# Patient Record
Sex: FEMALE | Race: WHITE | NOT HISPANIC OR LATINO | Employment: OTHER | URBAN - METROPOLITAN AREA
[De-identification: names, ages, dates, MRNs, and addresses within clinical notes are randomized per-mention and may not be internally consistent; named-entity substitution may affect disease eponyms.]

---

## 2017-01-06 ENCOUNTER — APPOINTMENT (OUTPATIENT)
Dept: WOMENS IMAGING | Facility: HOSPITAL | Age: 52
End: 2017-01-06

## 2017-01-06 PROCEDURE — G0202 SCR MAMMO BI INCL CAD: HCPCS | Performed by: RADIOLOGY

## 2017-01-06 PROCEDURE — 77063 BREAST TOMOSYNTHESIS BI: CPT | Performed by: RADIOLOGY

## 2017-01-09 ENCOUNTER — OFFICE VISIT (OUTPATIENT)
Dept: NEUROSURGERY | Facility: CLINIC | Age: 52
End: 2017-01-09

## 2017-01-09 VITALS
HEIGHT: 64 IN | BODY MASS INDEX: 32.27 KG/M2 | WEIGHT: 189 LBS | SYSTOLIC BLOOD PRESSURE: 128 MMHG | HEART RATE: 69 BPM | DIASTOLIC BLOOD PRESSURE: 81 MMHG

## 2017-01-09 DIAGNOSIS — M50.31 OTHER CERVICAL DISC DEGENERATION, HIGH CERVICAL REGION: ICD-10-CM

## 2017-01-09 DIAGNOSIS — Z86.018 HISTORY OF BENIGN SCHWANNOMA: Primary | ICD-10-CM

## 2017-01-09 PROCEDURE — 99213 OFFICE O/P EST LOW 20 MIN: CPT | Performed by: NEUROLOGICAL SURGERY

## 2017-01-09 RX ORDER — LORAZEPAM 0.5 MG/1
TABLET ORAL
Refills: 0 | COMMUNITY
Start: 2016-12-21 | End: 2017-03-16 | Stop reason: ALTCHOICE

## 2017-01-09 NOTE — PROGRESS NOTES
Subjective   Patient ID: Machelle Ortega is a 51 y.o. female is here today for follow-up of neck and right arm pain with new MRI of the thoracic and cervical spine and EMG of all four extremities.    The patient notes that her pain symptoms are unchanged since her last visit.    Neck Pain    This is a chronic problem. The current episode started more than 1 month ago. The problem occurs daily. The problem has been unchanged. Associated symptoms include a visual change and weakness. Pertinent negatives include no chest pain, fever or headaches.   Neurologic Problem   The patient's primary symptoms include clumsiness, focal sensory loss, focal weakness, a loss of balance, memory loss, a visual change and weakness. The patient's pertinent negatives include no altered mental status, near-syncope, slurred speech or syncope. This is a new problem. The current episode started 1 to 4 weeks ago. The neurological problem developed gradually. The problem has been waxing and waning since onset. There was right-sided and upper extremity focality noted. Associated symptoms include back pain, bladder incontinence, bowel incontinence, diaphoresis, fatigue, light-headedness, nausea, neck pain and shortness of breath. Pertinent negatives include no abdominal pain, auditory change, aura, chest pain, confusion, dizziness, fever, headaches, palpitations, vertigo or vomiting. Past treatments include acetaminophen, bed rest, medication, neck support, position change, sleep and walking. The treatment provided mild relief.       The following portions of the patient's history were reviewed and updated as appropriate: allergies, current medications, past family history, past medical history, past social history, past surgical history and problem list.    Review of Systems   Constitutional: Positive for diaphoresis and fatigue. Negative for fever.   Respiratory: Positive for shortness of breath.    Cardiovascular: Negative for chest pain,  palpitations and near-syncope.   Gastrointestinal: Positive for bowel incontinence and nausea. Negative for abdominal pain and vomiting.   Genitourinary: Positive for bladder incontinence.   Musculoskeletal: Positive for back pain and neck pain.   Neurological: Positive for focal weakness, weakness, light-headedness and loss of balance. Negative for dizziness, vertigo, syncope and headaches.   Psychiatric/Behavioral: Positive for memory loss. Negative for confusion.   All other systems reviewed and are negative.    It's been 3 years since her cervical corpectomy at C4-C5 with a cage and plate.  We discussed the cervical MRI which didn't show any severe cord compression.  Her symptoms are likely coming from the neck but nothing operative is needed.  We discussed also the thoracic MRI which showed no recurrence of tumor.  She also has a right shoulder problem and is seen by the orthopedic surgeon for that.  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  I asked her to talk to the thoracic surgeons about that.  I reassured her that nothing surgical as needed for the neck.  We'll try and work this out with some additional therapy.  She remains on gabapentin.  She does have a history of fibromyalgia.    Objective   Physical Exam   Constitutional: She is oriented to person, place, and time. She appears well-developed and well-nourished.   HENT:   Head: Normocephalic and atraumatic.   Eyes: Conjunctivae and EOM are normal. Pupils are equal, round, and reactive to light.   Fundoscopic exam:       The right eye shows no papilledema. The right eye shows venous pulsations.        The left eye shows no papilledema. The left eye shows venous pulsations.   Neck: Carotid bruit is not present.   Neurological: She is oriented to person, place, and time. She has a normal Finger-Nose-Finger Test and a normal Heel to Shin Test. Gait normal.   Reflex Scores:        Tricep reflexes are 2+ on the right side and 2+ on the left side.       Bicep reflexes are 2+ on the right side and 2+ on the left side.       Brachioradialis reflexes are 2+ on the right side and 2+ on the left side.       Patellar reflexes are 2+ on the right side and 2+ on the left side.       Achilles reflexes are 2+ on the right side and 2+ on the left side.  Psychiatric: Her speech is normal.     Neurologic Exam     Mental Status   Oriented to person, place, and time.   Registration of memory: Good recent and remote memory.   Attention: normal. Concentration: normal.   Speech: speech is normal   Level of consciousness: alert  Knowledge: consistent with education.     Cranial Nerves     CN II   Visual fields full to confrontation.   Visual acuity: normal    CN III, IV, VI   Pupils are equal, round, and reactive to light.  Extraocular motions are normal.     CN V   Facial sensation intact.   Right corneal reflex: normal  Left corneal reflex: normal    CN VII   Facial expression full, symmetric.   Right facial weakness: none  Left facial weakness: none    CN VIII   Hearing: intact    CN IX, X   Palate: symmetric    CN XI   Right sternocleidomastoid strength: normal  Left sternocleidomastoid strength: normal    CN XII   Tongue: not atrophic  Tongue deviation: none    Motor Exam   Muscle bulk: normal  Right arm tone: normal  Left arm tone: normal  Right leg tone: normal  Left leg tone: normal    Strength   Strength 5/5 except as noted.     Sensory Exam   Light touch normal.     Gait, Coordination, and Reflexes     Gait  Gait: normal    Coordination   Finger to nose coordination: normal  Heel to shin coordination: normal    Reflexes   Right brachioradialis: 2+  Left brachioradialis: 2+  Right biceps: 2+  Left biceps: 2+  Right triceps: 2+  Left triceps: 2+  Right patellar: 2+  Left patellar: 2+  Right achilles: 2+  Left achilles: 2+  Right : 2+  Left : 2+      Assessment/Plan   Independent Review of  Radiographic Studies:    Reviewed her MRI of the cervical thoracic spine.  There is no evidence of tumor recurrence in the thoracic spine.  No evidence of any significant adjacent level cord compression in the cervical MRI.  She had a C4 and C5 corpectomy with a cage and plate.    Medical Decision Making:    Nothing reoperative in the cervical spine.  No evidence of tumor recurrence of the thoracic spine.  We'll try some physical therapy for the neck.  We'll also send her to a new pain management group as per her request.  We'll see her in 4 months  She will touch base with the orthopedic surgeon about her right shoulder and with the thoracic team about her apparent recurrent diaphragmatic problem.      Machelle was seen today for neck pain.    Diagnoses and all orders for this visit:    History of benign schwannoma  -     Ambulatory Referral to Pain Management  -     Ambulatory Referral to Physical Therapy Evaluate and treat    Other cervical disc degeneration, high cervical region  -     Ambulatory Referral to Pain Management  -     Ambulatory Referral to Physical Therapy Evaluate and treat      Return in about 4 months (around 5/9/2017).

## 2017-01-09 NOTE — MR AVS SNAPSHOT
Machelle Ortega   1/9/2017 10:45 AM   Office Visit    Dept Phone:  [phone]   Encounter #:  [id number]    Provider:  Dayron Jarrett MD   Department:  [facility] NEUROSURGERY                Your Full Care Plan              Today's Medication Changes          These changes are accurate as of: 1/9/17 12:18 PM.  If you have any questions, ask your nurse or doctor.               Medication(s)that have changed:     FLUoxetine 40 MG capsule   Commonly known as:  PROzac   Take 40 mg by mouth daily.   What changed:  Another medication with the same name was removed. Continue taking this medication, and follow the directions you see here.   Changed by:  Iqra Powell MD         Stop taking medication(s)listed here:     diclofenac 1 % gel gel   Commonly known as:  VOLTAREN   Stopped by:  Dayron Jarrett MD                      Your Updated Medication List          This list is accurate as of: 1/9/17 12:18 PM.  Always use your most recent med list.                amphetamine-dextroamphetamine 20 MG tablet   Commonly known as:  ADDERALL       FLUoxetine 40 MG capsule   Commonly known as:  PROzac       fluticasone 50 MCG/ACT nasal spray   Commonly known as:  FLONASE       Gabapentin (Once-Daily) 300 MG tablet   Take 800 mg by mouth 2 (two) times a day.       HYDROcodone-acetaminophen 5-325 MG per tablet   Commonly known as:  NORCO       lamoTRIgine 200 MG tablet   Commonly known as:  LaMICtal       LORazepam 0.5 MG tablet   Commonly known as:  ATIVAN       MethylPREDNISolone 4 MG tablet   Commonly known as:  MEDROL (LULY)   follow package directions       pantoprazole 40 MG EC tablet   Commonly known as:  PROTONIX   TAKE 1 TABLET BY MOUTH EVERY DAY       traZODone 100 MG tablet   Commonly known as:  DESYREL               We Performed the Following     Ambulatory Referral to Pain Management     Ambulatory Referral to Physical Therapy Evaluate and treat       You Were Diagnosed  With        Codes Comments    History of benign schwannoma    -  Primary ICD-10-CM: Z86.018  ICD-9-CM: V12.49     Other cervical disc degeneration, high cervical region     ICD-10-CM: M50.31  ICD-9-CM: 722.4       Instructions     None    Patient Instructions History      Upcoming Appointments     Visit Type Date Time Department    FOLLOW UP 1/9/2017 10:45 AM [facility]    OFFICE VISIT 2/27/2017  2:30 PM [facility]AC[facility]    FOLLOW UP 5/8/2017 10:00 AM [facility] [place] [facility]      MyChart Signup     Our records indicate that you have an active [facility] [employer] account.    You can view your After Visit Summary by going to [employer] [place] and logging in with your [employer] username and password.  If you don't have a [employer] username and password but a parent or guardian has access to your record, the parent or guardian should login with their own [employer] username and password and access your record to view the After Visit Summary.    If you have questions, you can email [email or web address] or call [phone] to talk to our [employer] staff.  Remember, [employer] is NOT to be used for urgent needs.  For medical emergencies, dial 911.               Other Info from Your Visit           Your Appointments     Feb 27, 2017  2:30 PM EST   Office Visit with Darryl Benjamin MD   [facility] THORACIC SURGERY (--)    [address]  [facility] [address] [phone]           Arrive 15 minutes prior to appointment.            May 08, 2017 10:00 AM EDT   Follow Up with Dayron Jarrett MD   [facility] NEUROSURGERY (--)    [address]  [facility] [address] [phone]           Arrive 15 minutes prior to appointment.              Allergies     Budesonide-formoterol Fumarate      Oxycodone-acetaminophen      Pregabalin        Reason for Visit     Neck Pain           Vital Signs     Blood Pressure Pulse Height Weight Body Mass Index  "Smoking Status    128/81 69 64\" (162.6 cm) 189 lb (85.7 kg) 32.44 kg/m2 Never Smoker      Problems and Diagnoses Noted     History of benign schwannoma    Degeneration of intervertebral disc of cervical region        "

## 2017-01-09 NOTE — LETTER
January 9, 2017     Iqra Powell MD  [address]  [place] [id number]    Patient: Machelle Ortega   YOB: 1965   Date of Visit: 1/9/2017       Dear Dr. Andre MD:    Machelle Ortega was in my office today. Below are the relevant portions of my assessment and plan of care.      Independent Review of Radiographic Studies:    Reviewed her MRI of the cervical thoracic spine.  There is no evidence of tumor recurrence in the thoracic spine.  No evidence of any significant adjacent level cord compression in the cervical MRI.  She had a C4 and C5 corpectomy with a cage and plate.    Medical Decision Making:    Nothing reoperative in the cervical spine.  No evidence of tumor recurrence of the thoracic spine.  We'll try some physical therapy for the neck.  We'll also send her to a new pain management group as per her request.  We'll see her in 4 months  She will touch base with the orthopedic surgeon about her right shoulder and with the thoracic team about her apparent recurrent diaphragmatic problem.      Machelle was seen today for neck pain.    Diagnoses and all orders for this visit:    History of benign schwannoma  -     Ambulatory Referral to Pain Management  -     Ambulatory Referral to Physical Therapy Evaluate and treat    Other cervical disc degeneration, high cervical region  -     Ambulatory Referral to Pain Management  -     Ambulatory Referral to Physical Therapy Evaluate and treat      Return in about 4 months (around 5/9/2017).            If you have questions, please do not hesitate to call me. I look forward to following Machelle along with you.         Sincerely,        Dayron Jarrett MD        CC: No Recipients

## 2017-01-25 ENCOUNTER — OFFICE VISIT (OUTPATIENT)
Dept: PAIN MEDICINE | Facility: CLINIC | Age: 52
End: 2017-01-25

## 2017-01-25 VITALS
OXYGEN SATURATION: 98 % | WEIGHT: 187.4 LBS | BODY MASS INDEX: 31.99 KG/M2 | TEMPERATURE: 98.4 F | RESPIRATION RATE: 18 BRPM | SYSTOLIC BLOOD PRESSURE: 107 MMHG | HEIGHT: 64 IN | DIASTOLIC BLOOD PRESSURE: 58 MMHG | HEART RATE: 76 BPM

## 2017-01-25 DIAGNOSIS — G89.29 OTHER CHRONIC PAIN: Primary | ICD-10-CM

## 2017-01-25 DIAGNOSIS — M54.5 LOW BACK PAIN, UNSPECIFIED BACK PAIN LATERALITY, UNSPECIFIED CHRONICITY, WITH SCIATICA PRESENCE UNSPECIFIED: ICD-10-CM

## 2017-01-25 DIAGNOSIS — Z86.018 HISTORY OF BENIGN SCHWANNOMA: ICD-10-CM

## 2017-01-25 DIAGNOSIS — M96.1 POSTLAMINECTOMY SYNDROME, THORACIC: ICD-10-CM

## 2017-01-25 DIAGNOSIS — M51.36 DDD (DEGENERATIVE DISC DISEASE), LUMBAR: ICD-10-CM

## 2017-01-25 DIAGNOSIS — M54.2 CERVICAL PAIN: ICD-10-CM

## 2017-01-25 DIAGNOSIS — M50.30 DDD (DEGENERATIVE DISC DISEASE), CERVICAL: ICD-10-CM

## 2017-01-25 LAB
POC AMPHETAMINES: NEGATIVE
POC BARBITURATES: NEGATIVE
POC BENZODIAZEPHINES: POSITIVE
POC COCAINE: NEGATIVE
POC METHADONE: NEGATIVE
POC METHAMPHETAMINE SCREEN URINE: NEGATIVE
POC OPIATES: NEGATIVE
POC OXYCODONE: NEGATIVE
POC PHENCYCLIDINE: NEGATIVE
POC PROPOXYPHENE: NEGATIVE
POC THC: NEGATIVE
POC TRICYCLIC ANTIDEPRESSANTS: NEGATIVE

## 2017-01-25 PROCEDURE — 80305 DRUG TEST PRSMV DIR OPT OBS: CPT | Performed by: NURSE PRACTITIONER

## 2017-01-25 PROCEDURE — 99214 OFFICE O/P EST MOD 30 MIN: CPT | Performed by: NURSE PRACTITIONER

## 2017-01-25 RX ORDER — FLUOXETINE HYDROCHLORIDE 20 MG/1
60 CAPSULE ORAL NIGHTLY
COMMUNITY
Start: 2017-01-19 | End: 2018-10-03 | Stop reason: SDUPTHER

## 2017-01-25 RX ORDER — MONTELUKAST SODIUM 4 MG/500MG
4 GRANULE ORAL NIGHTLY
COMMUNITY
End: 2017-12-22

## 2017-01-25 RX ORDER — GABAPENTIN 400 MG/1
400 CAPSULE ORAL 3 TIMES DAILY
Qty: 90 CAPSULE | Refills: 1 | Status: ON HOLD | OUTPATIENT
Start: 2017-01-25 | End: 2020-01-16

## 2017-01-25 RX ORDER — BUPROPION HYDROCHLORIDE 150 MG/1
150 TABLET ORAL EVERY MORNING
COMMUNITY
Start: 2017-01-18 | End: 2017-06-14

## 2017-01-25 NOTE — PROGRESS NOTES
CHIEF COMPLAINT  Mrs. Ortega states that her neck pain started 2013. She had neck surgery in 2013 with Dr. Galloway. She states that her neck pain radiated to her right shoulder and down her right arm. Her back pain started 30 years ago. She states that she was diagnosed with a tumor in her thoracic spine in early 2006. She had back surgery on 3/31/2006 with Dr. Phelan and states that she has had nerve pain in her hips,legs and sciatic area ever since. She states that her back pain radiates to her buttocks and both legs with her pain greater on her left side. She states that she has increased sensation in her left foot and no sensation in her right foot. She has previously been in pain management at [facility] from [id number] but was dismissed to 2 missed appointments. She states that while she was there she had 3 facet injections in her low back that didn't help and 3 epidural injections in her mid back that did help her pain.     Subjective   Machelle Ortega is a  RHD 51 y.o. female.   She presents to the office for evaluation of neck and back pain. She was referred here by Dr. Jarrett.  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  She lives with her (18, 9). Does not smoke. Does drink alcohol, 4/year. Denies any history or current illicit substance use. Family history is positive for alcoholism(2 brothers). Family history is positive for neck and back problems( brother- 3 surgeries).    She has a history of benign throacic schwannoma and required fusion at T10-11 with Dr. Phelan. Also had a cervical fusion with cage and plate in 2014 with Dr. Jarrett.    She also has a right rotator cuff tear with Dr. Manley. No current plans for surgery. Plan will be to try injection first.     Her primary complaint is pain from her waist down. She also has complaints of neck pain. Today her pain is 6/10VAs(back) and 3/10VAS (neck).  Pain is usually  "worse on left leg versus right leg. Describes the pain as fairly continuous tender with occasional sharp pain. Pain increases with sitting, sitting on hard surfaces, certain activities and position; pain decreases with laying down, changing position, walking, ice and Aleve.  She generally takes OTC Aleve BID. Used to be in pain management.  Has not been in there for a few years.  She states she has an older prescription for Hydrocodone 5/325 which she states is from a prescription from Dr. lopez from when she broke her foot. She tries not to take this, took 1 in past month.      Took Lyrica for 3 years. \"it was a miracle drug\" but she states she gained 50 lbs while on the regimen. Is on gabapentin 800 mg nightly. Cannot tolerate twice daily due to somnolence. \"i could sleep all the time.\" Has been tried on Gralise. Patient could not notice a difference.     She started in pain management after thoracic laminectomy in 2006. Was seen at [facility]. She has had lumbar facet injections with what she reports as positive response. She states she would have a few days of relief, which means it could have been diagnostically positive. She then had thoracic 3 thoracic epidurals, greater than 5-6 years ago, with significant relief of back pain. However, it did not help her \"nerve pain.\"     States she has a history of bowel and bladder incontinence.    She is under the care of Dr. Dennis Steel as her psychiatrist.       Neck Pain    This is a chronic problem. The current episode started more than 1 year ago. The problem has been gradually worsening. The pain is at a severity of 3/10. The pain is moderate. The pain is worse during the day. Associated symptoms include chest pain, headaches, numbness (bilateral legs and buttocks), a visual change and weakness. Pertinent negatives include no fever. She has tried oral narcotics, neck support and ice for the symptoms. The treatment provided mild relief.   Back " Pain   This is a chronic problem. The current episode started more than 1 year ago. The problem occurs constantly (fairly constant). The problem has been gradually worsening since onset. The pain is present in the lumbar spine and thoracic spine. Radiates to: both legs. The pain is at a severity of 6/10. The pain is moderate. The symptoms are aggravated by bending, position, sitting and twisting. Associated symptoms include abdominal pain, bladder incontinence (states she has had incontinence since thoracic laminectomy), bowel incontinence (has chronic constipation), chest pain, headaches, numbness (bilateral legs and buttocks) and weakness. Pertinent negatives include no fever. Risk factors: history of benign schwannoma.      PEG Assessment   What number best describes your pain on average in the past week?6  What number best describes how, during the past week, pain has interfered with your enjoyment of life?7  What number best describes how, during the past week, pain has interfered with your general activity?  6      Current Outpatient Prescriptions:   •  amphetamine-dextroamphetamine (ADDERALL) 20 MG tablet, Take 20 mg by mouth 2 (Two) Times a Day., Disp: , Rfl:   •  buPROPion XL (WELLBUTRIN XL) 150 MG 24 hr tablet, , Disp: , Rfl:   •  FLUoxetine (PROzac) 20 MG capsule, , Disp: , Rfl:   •  fluticasone (FLONASE) 50 MCG/ACT nasal spray, 1 spray into each nostril daily., Disp: , Rfl:   •  HYDROcodone-acetaminophen (NORCO) 5-325 MG per tablet, Take 1 tablet by mouth every 8 (eight) hours as needed., Disp: , Rfl:   •  lamoTRIgine (LaMICtal) 200 MG tablet, Take 200 mg by mouth daily., Disp: , Rfl: 0  •  LORazepam (ATIVAN) 0.5 MG tablet, TAKE 1 TABLET BY MOUTH TWICE A DAY, Disp: , Rfl: 0  •  montelukast (SINGULAIR) 4 MG pack, Take 4 mg by mouth Every Night., Disp: , Rfl:   •  pantoprazole (PROTONIX) 40 MG EC tablet, TAKE 1 TABLET BY MOUTH EVERY DAY, Disp: 90 tablet, Rfl: 0  •  traZODone (DESYREL) 100 MG tablet, Take  300 mg by mouth Daily., Disp: , Rfl:   •  gabapentin (NEURONTIN) 400 MG capsule, Take 1 capsule by mouth 3 (Three) Times a Day., Disp: 90 capsule, Rfl: 1    The following portions of the patient's history were reviewed and updated as appropriate: allergies, current medications, past family history, past medical history, past social history, past surgical history and problem list.    REVIEW OF PERTINENT MEDICAL DATA  Jacob Depression Inventory is 33    Don report request [id number]  There is a single fill of promethazine and codeine prescribed by Iqra Powell, there are 4 fills of amphetamine/dextroamphetamine prescribed by Jesús Steel most recent was 12/21/16, and there's a single fill for lorazepam 0.5 mg quantity 60 prescribed by Jesús Steel on 12/21/16    Patient was seen in the office on 1/9/2017 by Dr. Dayron Jarrett wave a  Reviewed her MRI of the cervical thoracic spine. There is no evidence of tumor recurrence in the thoracic spine. No evidence of any significant adjacent level cord compression in the cervical MRI. She had a C4 and C5 corpectomy with a cage and plate.  Nothing reoperative in the cervical spine. No evidence of tumor recurrence of the thoracic spine. We'll try some physical therapy for the neck. We'll also send her to a new pain management group as per her request. We'll see her in 4 months She will touch base with the orthopedic surgeon about her right shoulder and with the thoracic team about her apparent recurrent diaphragmatic problem.     She was seen in the office on 12/6/2016 by GOMEZ Omalley with neurosurgery  I reviewed the previous progress notes. The patient has a history of T10-11 of laminectomy with Dr. Phelan in 2006 due to a schwannoma. She had a history of worsening gait. Workup in 2013 revealed cord compression of the cervical spine particularly at C4-5. She was clinically myelopathic and underwent CT 4 and C5 corpectomies with arthrodesis at C3-4, C4-5 and  C5-6, cage placement at C4 and C5 with plate by Dr. Jarrett in February 2014.  Ms. Ortega return to the office today for the above complaints. It has been over 2 years since she was last seen in the office. She has a history of schwannoma in the thoracic spine and underwent surgery as described above in 2006. She subsequently was found to be myelopathic and underwent cervical surgery with Dr. Jarrett in 2014.     Ms. Ortega has been doing well up until several weeks ago when she began noticing worsening numbness and tingling in her arms and fingers. She stated that she has been evaluated by Dr. Manley with orthopedics and has been told that she has a right rotator cuff problem. She has not followed up with Dr. Manley. The plan has been to try to avoid R shoulder surgery.     Given the above complaints, the exam findings and the history, I have recommended an MRI of the cervical and thoracic spine and without contrast. I will also get some cervical spine x-rays with flexion and extension images. We will get an EMG/NCS of all 4 extremities as well.  Ms. Ortega will continue with her current dose of gabapentin. She will try a Medrol Dosepak. She will follow-up with Dr. Jarrett after the above images are complete.      MRI OF THE CERVICAL SPINE WITH AND WITHOUT CONTRAST AND MRI OF THE  THORACIC SPINE WITH AND WITHOUT CONTRAST 12/14/2016      CLINICAL HISTORY: This patient in February 2014 had an anterior cervical  discectomy and fusion procedure from C3 to C6 and the patient has a  remote lower thoracic spine surgery for resection of benign tumor  schwannoma lower thoracic region. The patient complains of pain in  cervical and thoracic region and numbness and tingling in all 4  extremities.      MRI OF THE CERVICAL SPINE TECHNIQUE: Sagittal T1, proton density and  gradient echo T2 and fast spin-echo T2 and STIR and postcontrast  sagittal T1-weighted images were obtained of the cervical spine. In  addition,  axial T1 and gradient echo T2 and fast spin-echo T2 and  postcontrast axial T1-weighted images were obtained from the skull base  to T1.      FINDINGS: This patient has had previous corpectomies at C4 and C5, graft  material extending from the inferior endplate of C3 to the superior  endplate of C6. There is an anterior plate extending from the anterior  superior body of C3 and the anterior inferior endplate of C6 fixated by  screws at C3 and C6 vertebrae and due to susceptibility artifact off the  plate and screws, I cannot evaluate for solid bony fusion. There appears  to be good alignment of the hardware. The cervical cord and upper  thoracic cord is normal in signal intensity. At the C1-2 level, there is  minimal arthritic change at the atlantodental interval. Otherwise, the  C1-2 level is normal in appearance.      At C2-3, the disc space is normal. There is minimal left facet  overgrowth and uncovertebral joints are normal and there is no canal or  foraminal narrowing at C2-3.      At C3-4, there is mild left facet overgrowth. There is no canal or  foraminal narrowing.      At C4-5, there is mild bilateral facet overgrowth and minimal  uncovertebral joint hypertrophy and there is minimal left foraminal  narrowing, no central canal or right foraminal narrowing.      At C5-6, there is minimal right and mild left facet overgrowth, mild  bilateral uncovertebral joint hypertrophy. There is no canal narrowing.  There is mild bilateral foraminal narrowing.      At C6-7, the posterior disc margin and facets are normal with no canal  or foraminal narrowing.      At C7-T1, the posterior disc margin and facets are normal with no canal  or foraminal narrowing.      IMPRESSION:  1. This patient had had prior extensive cervical spine surgery with  corpectomies at C4 and C5, graft material extending from the inferior  endplate of C3 to the superior endplate of C6 and anterior plate and  screw fixation from C3 to C6. Due to  susceptibility artifact off the  hardware, I cannot evaluate for solid bony fusion from C3 to C6. There  appears to be good alignment of the hardware.  2. There is some mild facet overgrowth and uncovertebral joint  hypertrophy contributing to minimal left foraminal narrowing at C4-5,  mild bilateral foraminal narrowing at C5-6. The remainder of the  cervical spine MRI is normal.      MRI OF THE THORACIC SPINE TECHNIQUE: Sagittal T1, proton density and  fat-suppressed T2 and postcontrast sagittal fat-suppressed T1-weighted  images were obtained of the thoracic spine. In addition, axial  T2-weighted images were obtained from C7 to T12, thin cut pre and  postcontrast axial T1-weighted images were obtained from C7 to T6 and  then from T6 to T12.      This patient has had previous bilateral laminectomies at T10, T11, T12  for by history resection of lower thoracic schwannoma. There is  prominent thinning and loss of anterior posterior thickness of the  thoracic cord from the level of the midbody at T10 to the level of the  inferior body of T11 compatible with myelomalacia with distortion in the  posterior and left posterior lateral aspect of the thoracic spinal cord  and volume loss. The remainder of the thoracic cord is normal in signal  intensity. No thoracic disc herniation, central canal or foraminal  narrowing is seen and no residual or recurrent enhancing tumor is seen  in the thoracic spine. There is evidence of soft tissue along the  central diaphragmatic pleura of the right hemidiaphragm where there  appears to be some liver parenchyma protruding through the diaphragm  measuring 2.5 x 1.8 cm. This is at the site of a known prior  diaphragmatic hernia repair and is similar to prior CT of the abdomen  and pelvis 04/20/2015.      IMPRESSION:  1. No change since outside MRI of the thoracic spine 12/11/2013. This  patient has had prior bilateral laminectomies from T10 to T12. There is  marked thinning of the  anterior posterior diameter of the cord,  prominent myelomalacia in the posterior and left posterior lateral cord  from midbody T10 to the mid to inferior body of T11 and there is  distortion of the left posterior cord which is tented posteriorly likely  from arachnoidal adhesions. There is a thin chronic posterior midline  epidural fluid collection tracking posterior to the midline of the dura  from the level of the superior body of T10 to the level superior body of  T11, 24 mm in craniocaudal dimension measures 4 x 8 mm in anterior  posterior, medial lateral dimension unchanged and felt to be a chronic  postoperative seroma. There is no evidence of recurrent enhancing tumor  in the thoracic spine and no disc herniation, canal or foraminal  narrowing is seen in the thoracic spine.  2. There is known right diaphragmatic hernia repair and there appears to  be some focal liver parenchyma herniates through the central superior  aspect of the right hemidiaphragm measuring 2.5 x 1.8 cm in size similar  CT abdomen and pelvis 04/20/2015. There is some adjacent atelectasis at  the right lung base.  3. The remainder of the thoracic spine MRI is normal.      This report was finalized on 12/15/2016 11:49 AM by Dr. Andrew Baltazar MD    EMG REPORT  12-15-16     Indication: Pain and numbness of all 4 extremities     Findings: Bilateral median and ulnar sensory study showed normal latencies and amplitudes. Bilateral median motor study showed normal distal latencies velocities and amplitudes. Bilateral ulnar motor study showed normal distal latencies and amplitudes. Right peroneal motor study showed normal distal latency velocity and amplitude. Bilateral tibial motor study showed normal distal latencies amplitudes and F wave latencies.     Concentric needle EMG of bilateral deltoid, triceps, brachioradialis, extensor digitorum, and first dorsal interosseous muscles showed no abnormality.Concentric needle EMG of bilateral vastus  "lateralis, vastus medialis, anterior tibialis, peroneus, gastrocnemius muscles showed no abnormality.     Impression: Normal EMG and nerve conduction studies of all 4 extremities.     Thank you for sending this patient for further evaluation.  Stevo Schmitz M.D.    Review of Systems   Constitutional: Positive for fatigue. Negative for diaphoresis and fever.   HENT: Positive for congestion.    Eyes: Negative for visual disturbance.   Respiratory: Positive for shortness of breath.    Cardiovascular: Positive for chest pain. Negative for palpitations.   Gastrointestinal: Positive for abdominal pain, bowel incontinence (has chronic constipation) and nausea. Negative for vomiting.   Genitourinary: Positive for bladder incontinence (states she has had incontinence since thoracic laminectomy). Negative for difficulty urinating.   Musculoskeletal: Positive for back pain and neck pain.   Neurological: Positive for dizziness, vertigo, focal weakness, syncope, weakness, numbness (bilateral legs and buttocks), headaches and loss of balance.   Psychiatric/Behavioral: Positive for confusion, memory loss and sleep disturbance. Negative for suicidal ideas. The patient is nervous/anxious.        Vitals:    01/25/17 1404   BP: 107/58   Pulse: 76   Resp: 18   Temp: 98.4 °F (36.9 °C)   SpO2: 98%   Weight: 187 lb 6.4 oz (85 kg)   Height: 64\" (162.6 cm)   PainSc: 6  Comment: neck pain 3/10   PainLoc: Buttocks     Objective   Physical Exam   Constitutional: She is oriented to person, place, and time. Vital signs are normal. She appears well-developed and well-nourished. She is cooperative.   HENT:   Head: Normocephalic and atraumatic.   Nose: Nose normal.   Eyes: Conjunctivae, EOM and lids are normal. Pupils are equal, round, and reactive to light.   Neck: Trachea normal. Neck supple. Muscular tenderness present. No spinous process tenderness present. Decreased range of motion present.   Right anterior cervical surgical scar "   Cardiovascular: Normal rate, regular rhythm and normal heart sounds.    Pulmonary/Chest: Effort normal and breath sounds normal. No respiratory distress.   Abdominal: Normal appearance.   Musculoskeletal:        Thoracic back: She exhibits tenderness, pain and spasm.        Lumbar back: She exhibits tenderness and pain. She exhibits no bony tenderness.   Surgical scar of thoracic spine    + SLR on left   Neurological: She is alert and oriented to person, place, and time. No cranial nerve deficit. She exhibits normal muscle tone. Gait (bilateral antalgia) abnormal.   Reflex Scores:       Tricep reflexes are 2+ on the right side and 2+ on the left side.       Bicep reflexes are 2+ on the right side and 2+ on the left side.       Brachioradialis reflexes are 2+ on the right side and 2+ on the left side.       Patellar reflexes are 3+ on the right side and 3+ on the left side.       Achilles reflexes are 2+ on the right side and 2+ on the left side.  + 4 clonus on left, negative clonus on right   Skin: Skin is warm, dry and intact.   Psychiatric: She has a normal mood and affect. Her speech is normal and behavior is normal. Judgment and thought content normal. Cognition and memory are normal.   Nursing note and vitals reviewed.      Assessment/Plan   Machelle was seen today for neck pain and back pain.    Diagnoses and all orders for this visit:    Other chronic pain  -     POC Urine Drug Screen, Triage  -     Urine Drug Screen Confirmation    History of benign schwannoma  -     Case Request  -     POC Urine Drug Screen, Triage  -     Urine Drug Screen Confirmation    Low back pain, unspecified back pain laterality, unspecified chronicity, with sciatica presence unspecified  -     Case Request  -     POC Urine Drug Screen, Triage  -     Urine Drug Screen Confirmation    DDD (degenerative disc disease), cervical  -     POC Urine Drug Screen, Triage  -     Urine Drug Screen Confirmation    DDD (degenerative disc disease),  lumbar  -     Case Request  -     POC Urine Drug Screen, Triage  -     Urine Drug Screen Confirmation    Cervical pain  -     POC Urine Drug Screen, Triage  -     Urine Drug Screen Confirmation    Postlaminectomy syndrome, thoracic  -     Case Request  -     POC Urine Drug Screen, Triage  -     Urine Drug Screen Confirmation    Other orders  -     gabapentin (NEURONTIN) 400 MG capsule; Take 1 capsule by mouth 3 (Three) Times a Day.      --- Routine new patient initial UDS in office today as part of monitoring requirements for controlled substances.  The specimen was viewed and the immunoassay result reviewed and is +BZD.  This specimen will be sent to [facility] for confirmation.     --- Obtain records from [facility].  ---  Trial of gabapentin 400 mg 3/day ( 1 in morning and 2 at night) to maximize anti-neuropathic agents. Discussed medication with the patient.  Included in this discussion was the potential for side effects and adverse events.  Patient verbalized understanding and wished to proceed.  Prescription will be sent to pharmacy. The patient will be started on a trial of gabapentin. Reviewed potential side effects, including somnolence and dizziness.   --- Discussed at length with Dr. London.-- see below  --- T12 epidural x2, 2-4 weeks apart. No blood thinners. Reviewed the procedure at length with the patient.  Included in the review was expectations, complications, risk and benefits.The procedure was described in detail and the risks, benefits and alternatives were discussed with the patient (including but not limited to: bleeding, infection, nerve damage, worsening of pain, inability to perform injection, paralysis, seizures, and death) who agreed to proceed.  Discussed the potential for sedation if warranted/wanted.  Questions were answered and in a way the patient could understand.  Patient verbalized understanding and wishes to proceed.  This intervention will be  ordered.  --- Consider intrathecal prialt pump.    --- Continue with PT.  -- consider cervical interventions in future as needed. Her primary complaint is her mid and low back pain, as well as her leg pain.   --- Follow-up after procedure or sooner if needed.       DIVINE REPORT    DIVINE report has been reviewed and scanned into the patient's chart.    Date of last DIVINE : 1-24-17        EMR Dragon/Transcription disclaimer:   Much of this encounter note is an electronic transcription/translation of spoken language to printed text. The electronic translation of spoken language may permit erroneous, or at times, nonsensical words or phrases to be inadvertently transcribed; Although I have reviewed the note for such errors, some may still exist.

## 2017-01-25 NOTE — MR AVS SNAPSHOT
[facility] PAIN MANAGEMENT  [phone]                    Machelle Ortega   1/25/2017 2:00 PM   Office Visit    Dept Phone:  [phone]   Encounter #:  [id number]    Provider:  GOMEZ Samson   Department:  [facility] PAIN MANAGEMENT                Your Full Care Plan              Today's Medication Changes          These changes are accurate as of: 1/25/17  3:33 PM.  If you have any questions, ask your nurse or doctor.               New Medication(s)Ordered:     gabapentin 400 MG capsule   Commonly known as:  NEURONTIN   Take 1 capsule by mouth 3 (Three) Times a Day.   Started by:  GOMEZ Samson         Medication(s)that have changed:     FLUoxetine 20 MG capsule   Commonly known as:  PROzac   What changed:  Another medication with the same name was removed. Continue taking this medication, and follow the directions you see here.   Changed by:  GOMEZ Samson         Stop taking medication(s)listed here:     Gabapentin (Once-Daily) 300 MG tablet   Stopped by:  GOMEZ Samson           MethylPREDNISolone 4 MG tablet   Commonly known as:  MEDROL (LULY)   Stopped by:  GOMEZ Samson                Where to Get Your Medications      These medications were sent to [facility]/pharmacy #[id number] - [facility] [place], [place] - [address] AT [facility] [phone] [facility] [phone]   [address] [facility] [address]     Phone:  [phone]     gabapentin 400 MG capsule                  Your Updated Medication List          This list is accurate as of: 1/25/17  3:33 PM.  Always use your most recent med list.                amphetamine-dextroamphetamine 20 MG tablet   Commonly known as:  ADDERALL       buPROPion  MG 24 hr tablet   Commonly known as:  WELLBUTRIN XL       FLUoxetine 20 MG capsule   Commonly known as:  PROzac       fluticasone 50 MCG/ACT nasal spray   Commonly known as:  FLONASE       gabapentin 400 MG  capsule   Commonly known as:  NEURONTIN   Take 1 capsule by mouth 3 (Three) Times a Day.       HYDROcodone-acetaminophen 5-325 MG per tablet   Commonly known as:  NORCO       lamoTRIgine 200 MG tablet   Commonly known as:  LaMICtal       LORazepam 0.5 MG tablet   Commonly known as:  ATIVAN       pantoprazole 40 MG EC tablet   Commonly known as:  PROTONIX   TAKE 1 TABLET BY MOUTH EVERY DAY       SINGULAIR 4 MG pack   Generic drug:  montelukast       traZODone 100 MG tablet   Commonly known as:  DESYREL               We Performed the Following     Case Request       You Were Diagnosed With        Codes Comments    Other chronic pain    -  Primary ICD-10-CM: G89.29  ICD-9-CM: 338.29     History of benign schwannoma     ICD-10-CM: Z86.018  ICD-9-CM: V12.49     Low back pain, unspecified back pain laterality, unspecified chronicity, with sciatica presence unspecified     ICD-10-CM: M54.5  ICD-9-CM: 724.2     DDD (degenerative disc disease), cervical     ICD-10-CM: M50.30  ICD-9-CM: 722.4     DDD (degenerative disc disease), lumbar     ICD-10-CM: M51.36  ICD-9-CM: 722.52     Cervical pain     ICD-10-CM: M54.2  ICD-9-CM: 723.1     Postlaminectomy syndrome, thoracic     ICD-10-CM: M96.1  ICD-9-CM: 722.82       Instructions     None    Patient Instructions History      Upcoming Appointments     Visit Type Date Time Department    NEW PATIENT 1/25/2017  2:00 PM [facility] PAIN [facility]    OFFICE VISIT 2/27/2017  2:30 PM [facility] THORACIC SPEC [facility]    FOLLOW UP 5/8/2017 10:00 AM [facility] NEUROSURG[facility]      [facility]     Our records indicate that you have an active [employer] account.    You can view your After Visit Summary by going to [employer] and logging in with your [email or web address] username and password.  If you don't have a [email or web address] username and password but a parent or guardian has access to your record, the parent or guardian should login with their own [email or web address] username and password and access your record to  "view the After Visit Summary.    If you have questions, you can email [email or web address] or call [phone] to talk to our MyChart staff.  Remember, [employer] is NOT to be used for urgent needs.  For medical emergencies, dial 911.               Other Info from Your Visit           Your Appointments     Feb 27, 2017  2:30 PM EST   Office Visit with Darryl Benjamin MD   [facility] THORACIC SURGERY (--)    [address]  [facility] [address] [phone]           Arrive 15 minutes prior to appointment.            May 08, 2017 10:00 AM EDT   Follow Up with Dayron Jarrett MD   [facility] NEUROSURGERY (--)    [address]  [facility] [address] [phone]           Arrive 15 minutes prior to appointment.              Allergies     Budesonide-formoterol Fumarate      Oxycodone-acetaminophen      Pregabalin        Reason for Visit     Neck Pain     Back Pain           Vital Signs     Blood Pressure Pulse Temperature Respirations Height Weight    107/58 76 98.4 °F (36.9 °C) 18 64\" (162.6 cm) 187 lb 6.4 oz (85 kg)    Oxygen Saturation Body Mass Index Smoking Status             98% 32.17 kg/m2 Never Smoker         Problems and Diagnoses Noted     Cervical pain    Chronic pain    Degeneration of intervertebral disc of cervical region    Degeneration of lumbar or lumbosacral intervertebral disc    History of benign schwannoma    Low back pain    Postlaminectomy syndrome, thoracic        "

## 2017-02-09 ENCOUNTER — OUTSIDE FACILITY SERVICE (OUTPATIENT)
Dept: PAIN MEDICINE | Facility: CLINIC | Age: 52
End: 2017-02-09

## 2017-02-09 PROCEDURE — 62321 NJX INTERLAMINAR CRV/THRC: CPT | Performed by: ANESTHESIOLOGY

## 2017-02-18 ENCOUNTER — OFFICE VISIT (OUTPATIENT)
Dept: RETAIL CLINIC | Facility: CLINIC | Age: 52
End: 2017-02-18

## 2017-02-18 VITALS
HEART RATE: 71 BPM | DIASTOLIC BLOOD PRESSURE: 72 MMHG | SYSTOLIC BLOOD PRESSURE: 108 MMHG | OXYGEN SATURATION: 96 % | TEMPERATURE: 98.4 F | RESPIRATION RATE: 16 BRPM

## 2017-02-18 DIAGNOSIS — J02.0 STREP PHARYNGITIS: Primary | ICD-10-CM

## 2017-02-18 LAB
EXPIRATION DATE: ABNORMAL
INTERNAL CONTROL: ABNORMAL
Lab: ABNORMAL
S PYO AG THROAT QL: POSITIVE

## 2017-02-18 PROCEDURE — 99213 OFFICE O/P EST LOW 20 MIN: CPT | Performed by: NURSE PRACTITIONER

## 2017-02-18 PROCEDURE — 87880 STREP A ASSAY W/OPTIC: CPT | Performed by: NURSE PRACTITIONER

## 2017-02-18 RX ORDER — AMOXICILLIN 875 MG/1
875 TABLET, COATED ORAL 2 TIMES DAILY
Qty: 20 TABLET | Refills: 0 | Status: SHIPPED | OUTPATIENT
Start: 2017-02-18 | End: 2017-02-28

## 2017-02-18 NOTE — PROGRESS NOTES
[facility]    CC:   Chief Complaint   Patient presents with   • Sore Throat       SUBJECTIVE:  HPI  Patient is a 51 YOF who presents c/o 1-2 days of severely sore throat/bilateral ear pain/headache/bodyaches. Denies known fever. Taking mucinex without relief. Denies known sick contacts, recent sickness, antibiotic use.     ROS:  Pertinent positives and negatives as per HPI in a 14 point review of systems. All other systems reviewed and found to be negative.     Past Medical History   Diagnosis Date   • Attention deficit hyperactivity disorder    • Chronic pain    • Claustrophobia    • Depression    • Fatigue    • Fibromyalgia    • Fibromyositis    • GERD (gastroesophageal reflux disease)    • Hyperlipidemia    • IBS (irritable bowel syndrome)    • Infectious mononucleosis    • Iron deficiency anemia    • Lumbago    • Mood disorder    • Sleep apnea        Past Surgical History   Procedure Laterality Date   • Bronchoscopy     •  section     • Bladder neck reconstruction     • Thoracoscopy       WITH WEDGE RESECTION OF LUNG   • Appendectomy     • Eye surgery       Lasik   • Back surgery       spinal tumor   T10/11 schwannoma   • Lung surgery     • Endometrial ablation         Current Outpatient Prescriptions:   •  amphetamine-dextroamphetamine (ADDERALL) 20 MG tablet, Take 20 mg by mouth 2 (Two) Times a Day., Disp: , Rfl:   •  buPROPion XL (WELLBUTRIN XL) 150 MG 24 hr tablet, , Disp: , Rfl:   •  FLUoxetine (PROzac) 20 MG capsule, , Disp: , Rfl:   •  fluticasone (FLONASE) 50 MCG/ACT nasal spray, 1 spray into each nostril daily., Disp: , Rfl:   •  gabapentin (NEURONTIN) 400 MG capsule, Take 1 capsule by mouth 3 (Three) Times a Day., Disp: 90 capsule, Rfl: 1  •  HYDROcodone-acetaminophen (NORCO) 5-325 MG per tablet, Take 1 tablet by mouth every 8 (eight) hours as needed., Disp: , Rfl:   •  lamoTRIgine (LaMICtal) 200 MG tablet, Take 200 mg by mouth daily., Disp: , Rfl: 0  •  LORazepam (ATIVAN)  0.5 MG tablet, TAKE 1 TABLET BY MOUTH TWICE A DAY, Disp: , Rfl: 0  •  montelukast (SINGULAIR) 4 MG pack, Take 4 mg by mouth Every Night., Disp: , Rfl:   •  pantoprazole (PROTONIX) 40 MG EC tablet, TAKE 1 TABLET BY MOUTH EVERY DAY, Disp: 90 tablet, Rfl: 0  •  traZODone (DESYREL) 100 MG tablet, Take 300 mg by mouth Daily., Disp: , Rfl:   •  amoxicillin (AMOXIL) 875 MG tablet, Take 1 tablet by mouth 2 (Two) Times a Day for 10 days., Disp: 20 tablet, Rfl: 0  •  lidocaine viscous (XYLOCAINE) 2 % solution, Take 5 mL by mouth As Needed for mild pain (1-3)., Disp: 200 mL, Rfl: 0  No current facility-administered medications for this visit.     Allergies   Allergen Reactions   • Budesonide-Formoterol Fumarate    • Oxycodone-Acetaminophen    • Pregabalin        OBJECTIVE:    Visit Vitals   • /72   • Pulse 71   • Temp 98.4 °F (36.9 °C) (Oral)   • Resp 16   • SpO2 96%   Physical Exam   Constitutional: She is oriented to person, place, and time and well-developed, well-nourished, and in no distress.   HENT:   Head: Normocephalic and atraumatic.   Right Ear: External ear normal.   Left Ear: External ear normal.   Nose: Nose normal.   Posterior pharyngeal erythema   Eyes: Conjunctivae and EOM are normal. Pupils are equal, round, and reactive to light.   Neck:   Mildly tender lymphadenopathy   Cardiovascular: Normal rate, regular rhythm, normal heart sounds and intact distal pulses.  Exam reveals no gallop and no friction rub.    No murmur heard.  Pulmonary/Chest: Effort normal and breath sounds normal. No respiratory distress. She has no wheezes. She has no rales.   Neurological: She is alert and oriented to person, place, and time.   Skin: Skin is warm and dry. No erythema.   Psychiatric: Mood, memory, affect and judgment normal.   Vitals reviewed.      Lab Results (last 24 hours)     Procedure Component Value Units Date/Time    POC Rapid Strep A [[id number]]  (Abnormal) Collected:  02/18/17 2028    Specimen:  Other Updated:   02/18/17 2029     Rapid Strep A Screen Positive (A)      Internal Control Passed      Lot Number [id number]      Expiration Date 7/2018           ASSESSMENT/PLAN    1. Strep pharyngitis    - amoxicillin (AMOXIL) 875 MG tablet; Take 1 tablet by mouth 2 (Two) Times a Day for 10 days.  Dispense: 20 tablet; Refill: 0  - lidocaine viscous (XYLOCAINE) 2 % solution; Take 5 mL by mouth As Needed for mild pain (1-3).  Dispense: 200 mL; Refill: 0

## 2017-02-27 ENCOUNTER — OFFICE VISIT (OUTPATIENT)
Dept: SURGERY | Facility: CLINIC | Age: 52
End: 2017-02-27

## 2017-02-27 VITALS
OXYGEN SATURATION: 99 % | BODY MASS INDEX: 30.73 KG/M2 | HEIGHT: 64 IN | RESPIRATION RATE: 16 BRPM | HEART RATE: 64 BPM | WEIGHT: 180 LBS | SYSTOLIC BLOOD PRESSURE: 118 MMHG | DIASTOLIC BLOOD PRESSURE: 60 MMHG

## 2017-02-27 DIAGNOSIS — K44.9 DIAPHRAGMATIC HERNIA WITHOUT OBSTRUCTION AND WITHOUT GANGRENE: ICD-10-CM

## 2017-02-27 DIAGNOSIS — R05.8 DRY COUGH: ICD-10-CM

## 2017-02-27 DIAGNOSIS — R06.02 SHORTNESS OF BREATH: Primary | ICD-10-CM

## 2017-02-27 PROCEDURE — 99213 OFFICE O/P EST LOW 20 MIN: CPT | Performed by: THORACIC SURGERY (CARDIOTHORACIC VASCULAR SURGERY)

## 2017-02-27 NOTE — PROGRESS NOTES
Subjective   Patient ID: Machelle Ortega is a 51 y.o. female who presents to the office today for a follow up visit for Diaphragmatic hernia syndrome referred by Dr. Jarrett.    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  On a recent MRI of her thoracic spine it was observed that there appeared to be some evidence of either recurrent or residual tissue at the level of the diaphragmatic defect.  She returns today to discuss those studies and determine next steps.  She still has some pain and discomfort along her anterior abdominal wall and right chest wall when taking deep breath.  This is dramatically reduced when compared to prior episodes.  It is described as a dull achy pain sometimes stabbing radiating from the front to the back on her right side.  She denies any complaints of fever, chills, cough, hemoptysis, pleuritic chest pain, shortness of air, dyspnea with exertion, night sweats, hoarseness, or unintentional weight loss. Underlying medical conditions including GERD, fibromyalgia and hyperlipidemia remain stable.  She has no other somatic complaints or alleviating or exacerbating factors aside from those mentioned above.    The following portions of the patient's history were reviewed and updated as appropriate: allergies, current medications, past family history, past medical history, past social history, past surgical history and problem list.  Review of Systems   Constitution: Positive for malaise/fatigue.   HENT: Negative.    Eyes: Negative.    Cardiovascular: Negative.    Respiratory: Positive for shortness of breath (w/ exertion).    Endocrine: Negative.    Hematologic/Lymphatic: Negative.    Skin: Negative.    Musculoskeletal: Negative.    Gastrointestinal: Negative.    Genitourinary: Negative.     Neurological: Negative.    Psychiatric/Behavioral: Negative.      Patient Active Problem List   Diagnosis   • Abdominal pain   • Ataxic gait   • Cervical pain   • Chest pain, pleuritic   • DDD (degenerative disc disease), cervical   • DDD (degenerative disc disease), lumbar   • Dry cough   • Extremity pain   • Low back pain   • Chronic nausea   • Loss of feeling or sensation   • Pain in thoracic spine   • Pleural cavity effusion   • Breath shortness   • Cervical spinal stenosis   • IBS (irritable bowel syndrome)   • GERD (gastroesophageal reflux disease)   • Multiple joint complaints   • Attention deficit hyperactivity disorder   • Fibromyalgia   • Mood disorder   • Fatigue   • Sleep apnea   • Hyperlipidemia   • History of benign schwannoma   • Other cervical disc degeneration, high cervical region   • Chronic pain   • Postlaminectomy syndrome, thoracic     Past Medical History   Diagnosis Date   • Attention deficit hyperactivity disorder    • Chronic pain    • Claustrophobia    • Depression    • Fatigue    • Fibromyalgia    • Fibromyositis    • GERD (gastroesophageal reflux disease)    • Hyperlipidemia    • IBS (irritable bowel syndrome)    • Infectious mononucleosis    • Iron deficiency anemia    • Lumbago    • Mood disorder    • Sleep apnea      Past Surgical History   Procedure Laterality Date   • Bronchoscopy     •  section     • Bladder neck reconstruction     • Thoracoscopy       WITH WEDGE RESECTION OF LUNG   • Appendectomy     • Eye surgery       Lasik   • Back surgery       spinal tumor   T10/11 schwannoma   • Lung surgery     • Endometrial ablation       Family History   Problem Relation Age of Onset   • Alcohol abuse Other    • Arthritis Other    • Diabetes Other    • Drug abuse Other    • Hepatitis Other    • Hypertension Other    • Depression Mother    • Lung cancer Mother    • Thyroid disease Mother    • Hashimoto's thyroiditis Daughter    • Breast cancer Maternal Aunt    •  Prostate cancer Maternal Uncle    • Cancer Maternal Uncle    • Heart disease Paternal Grandmother    • Alzheimer's disease Paternal Grandfather      Social History     Social History   • Marital status:      Spouse name: N/A   • Number of children: N/A   • Years of education: N/A     Occupational History   • Not on file.     Social History Main Topics   • Smoking status: Never Smoker   • Smokeless tobacco: Never Used   • Alcohol use Yes      Comment: occasional   • Drug use: Yes     Special: Hydrocodone   • Sexual activity: Defer     Other Topics Concern   • Not on file     Social History Narrative       Current Outpatient Prescriptions:   •  amoxicillin (AMOXIL) 875 MG tablet, Take 1 tablet by mouth 2 (Two) Times a Day for 10 days., Disp: 20 tablet, Rfl: 0  •  amphetamine-dextroamphetamine (ADDERALL) 20 MG tablet, Take 20 mg by mouth 2 (Two) Times a Day., Disp: , Rfl:   •  buPROPion XL (WELLBUTRIN XL) 150 MG 24 hr tablet, , Disp: , Rfl:   •  FLUoxetine (PROzac) 20 MG capsule, , Disp: , Rfl:   •  fluticasone (FLONASE) 50 MCG/ACT nasal spray, 1 spray into each nostril daily., Disp: , Rfl:   •  gabapentin (NEURONTIN) 400 MG capsule, Take 1 capsule by mouth 3 (Three) Times a Day., Disp: 90 capsule, Rfl: 1  •  lamoTRIgine (LaMICtal) 200 MG tablet, Take 200 mg by mouth daily., Disp: , Rfl: 0  •  LORazepam (ATIVAN) 0.5 MG tablet, TAKE 1 TABLET BY MOUTH TWICE A DAY, Disp: , Rfl: 0  •  montelukast (SINGULAIR) 4 MG pack, Take 4 mg by mouth Every Night., Disp: , Rfl:   •  pantoprazole (PROTONIX) 40 MG EC tablet, TAKE 1 TABLET BY MOUTH EVERY DAY, Disp: 90 tablet, Rfl: 0  •  traZODone (DESYREL) 100 MG tablet, Take 300 mg by mouth Daily., Disp: , Rfl:   Allergies   Allergen Reactions   • Budesonide-Formoterol Fumarate    • Oxycodone-Acetaminophen    • Pregabalin         Objective   Vitals:    02/27/17 1440   BP: 118/60   Pulse: 64   Resp: 16   SpO2: 99%     Physical Exam   Constitutional: She is oriented to person, place,  and time. Vital signs are normal. She appears well-developed and well-nourished.   HENT:   Head: Normocephalic and atraumatic.   Eyes: EOM are normal. Pupils are equal, round, and reactive to light.   Neck: Normal range of motion. Neck supple.   Cardiovascular: Normal rate, regular rhythm, normal heart sounds and intact distal pulses.    No murmur heard.  Pulmonary/Chest: Effort normal and breath sounds normal. She has no wheezes. She has no rhonchi. She has no rales. She exhibits no tenderness.   Abdominal: Soft. There is no tenderness.   Musculoskeletal: Normal range of motion. She exhibits no edema or tenderness.   Neurological: She is alert and oriented to person, place, and time. She has normal strength.   Skin: Skin is warm and dry. No rash noted. No cyanosis or erythema.   Psychiatric: She has a normal mood and affect. Her behavior is normal.     Independent Review of Radiographic Studies:  I have personally reviewed her MRI\CAT scan images and reports.  IMPRESSION:  1. No change since outside MRI of the thoracic spine 12/11/2013. This  patient has had prior bilateral laminectomies from T10 to T12. There is  marked thinning of the anterior posterior diameter of the cord,  prominent myelomalacia in the posterior and left posterior lateral cord  from midbody T10 to the mid to inferior body of T11 and there is  distortion of the left posterior cord which is tented posteriorly likely  from arachnoidal adhesions. There is a thin chronic posterior midline  epidural fluid collection tracking posterior to the midline of the dura  from the level of the superior body of T10 to the level superior body of  T11, 24 mm in craniocaudal dimension measures 4 x 8 mm in anterior  posterior, medial lateral dimension unchanged and felt to be a chronic  postoperative seroma. There is no evidence of recurrent enhancing tumor  in the thoracic spine and no disc herniation, canal or foraminal  narrowing is seen in the thoracic  spine.  2. There is known right diaphragmatic hernia repair and there appears to  be some focal liver parenchyma herniates through the central superior  aspect of the right hemidiaphragm measuring 2.5 x 1.8 cm in size similar  CT abdomen and pelvis 04/20/2015. There is some adjacent atelectasis at  the right lung base.  3. The remainder of the thoracic spine MRI is normal.      Assessment/Plan   Assessment:  Ectopic hepatic lobe status post resection.  Chronic chest wall pain.  Multiple medical comorbidities.    Plan:  I would like to repeat her CT scan of the chest and abdomen compared to prior exams.  I suspect that what we are seeing is actually scar tissue or residual tissue from the the base of the pedicle which entered from the abdominal side of the diaphragm into the right pleural space.  With so many medical comorbidities currently requiring's extensive workup and treatment this would be a low priority should the be residual tissue there.  I will update you on the results of her scans and any plans that we may have.Should you have any questions or concerns regarding your patient's care, please do not hesitate to contact me.  Sincerely, Darryl Benjamin M.D.    There are no diagnoses linked to this encounter.

## 2017-03-09 ENCOUNTER — OUTSIDE FACILITY SERVICE (OUTPATIENT)
Dept: PAIN MEDICINE | Facility: CLINIC | Age: 52
End: 2017-03-09

## 2017-03-09 PROCEDURE — 62321 NJX INTERLAMINAR CRV/THRC: CPT | Performed by: ANESTHESIOLOGY

## 2017-03-16 ENCOUNTER — OFFICE VISIT (OUTPATIENT)
Dept: SURGERY | Facility: CLINIC | Age: 52
End: 2017-03-16

## 2017-03-16 VITALS — BODY MASS INDEX: 31.45 KG/M2 | HEART RATE: 84 BPM | OXYGEN SATURATION: 98 % | HEIGHT: 64 IN | WEIGHT: 184.2 LBS

## 2017-03-16 DIAGNOSIS — K21.9 GASTROESOPHAGEAL REFLUX DISEASE, ESOPHAGITIS PRESENCE NOT SPECIFIED: ICD-10-CM

## 2017-03-16 DIAGNOSIS — K59.04 CHRONIC IDIOPATHIC CONSTIPATION: ICD-10-CM

## 2017-03-16 DIAGNOSIS — R11.0 CHRONIC NAUSEA: Primary | ICD-10-CM

## 2017-03-16 PROCEDURE — 99204 OFFICE O/P NEW MOD 45 MIN: CPT | Performed by: SURGERY

## 2017-03-17 NOTE — PROGRESS NOTES
CC:  Constipation    HPI:  51-year-old lady referred by Dr. Powell for consultation regarding constipation.  Reports 3 year history of abdominal distention and severe constipation.  She has not tried any prescription or over-the-counter products to see if this would help.  She also reports mild to moderate generalized intermittent abdominal pain that is worse when she is constipated and associated with nausea and reflux.    ROS:  No chest pain or shortness of air.  Positive for chronic back, neck, joint pain.  All other systems reviewed and negative other than presenting complaints.    PSH:    Appendectomy  Back surgery  Neck surgery  Breast biopsy  Right diaphragmatic hernia repair    PMH:    Asthma  Gastroesophageal reflux disease  Sleep apnea  Anxiety/depression    MEDICATIONS: reviewed and reconciled in EMR    ALLERGIES: reviewed and reconciled in EMR    FAMILY HISTORY:  Negative for colorectal cancer    SOCIAL HISTORY:   Denies tobacco use  Occasional alcohol use    PHYSICAL EXAM:   Constitutional: Well-developed well-nourished, no acute distress   Heart rate 84   Weight (lbs) 184   BMI 31.6   Height 64 inches  Eyes: Conjunctiva normal, sclera nonicteric  ENMT: Hearing grossly normal, oral mucosa moist  Neck: Supple, no palpable mass, normal thyroid, trachea midline  Respiratory: Clear to auscultation, normal inspiratory effort  Cardiovascular: Regular rate, no murmur, no carotid bruit, no peripheral edema, no jugular venous distention  Gastrointestinal: Soft, nontender, no palpable mass, no hepatosplenomegaly, negative for hernia, bowel sounds normal  Lymphatics: Negative for  cervical  axillary  inguinal  adenopathy  Skin:  Warm, dry, no rash on visualized skin surfaces  Musculoskeletal: Symmetric strength, normal gait  Psychiatric: Alert and oriented ×3, normal affect     CLINICAL SUMMARY (A/P):    51-year-old lady with symptoms of abdominal pain and severe constipation and bloating for which further workup  is warranted.  I have recommended proceeding with EGD and colonoscopy, she understands rationale for these procedures and the risks.  Additionally, she is orally been scheduled for CT scan of the chest, abdomen and pelvis by Dr. Benjamin and this should complete her workup for the current presentation.    Mohit Natarajan M.D.

## 2017-03-22 ENCOUNTER — HOSPITAL ENCOUNTER (OUTPATIENT)
Facility: HOSPITAL | Age: 52
Setting detail: HOSPITAL OUTPATIENT SURGERY
Discharge: HOME | End: 2017-03-22
Attending: SURGERY | Admitting: SURGERY

## 2017-03-22 ENCOUNTER — ANESTHESIA (OUTPATIENT)
Dept: GASTROENTEROLOGY | Facility: HOSPITAL | Age: 52
End: 2017-03-22

## 2017-03-22 ENCOUNTER — ANESTHESIA EVENT (OUTPATIENT)
Dept: GASTROENTEROLOGY | Facility: HOSPITAL | Age: 52
End: 2017-03-22

## 2017-03-22 VITALS
BODY MASS INDEX: 30.39 KG/M2 | WEIGHT: 178 LBS | TEMPERATURE: 98.2 F | HEIGHT: 64 IN | HEART RATE: 54 BPM | OXYGEN SATURATION: 100 % | DIASTOLIC BLOOD PRESSURE: 90 MMHG | SYSTOLIC BLOOD PRESSURE: 105 MMHG | RESPIRATION RATE: 16 BRPM

## 2017-03-22 DIAGNOSIS — R11.0 CHRONIC NAUSEA: ICD-10-CM

## 2017-03-22 DIAGNOSIS — K59.04 CHRONIC IDIOPATHIC CONSTIPATION: ICD-10-CM

## 2017-03-22 DIAGNOSIS — K21.9 GASTROESOPHAGEAL REFLUX DISEASE, ESOPHAGITIS PRESENCE NOT SPECIFIED: ICD-10-CM

## 2017-03-22 LAB
B-HCG UR QL: NEGATIVE
INTERNAL NEGATIVE CONTROL: NEGATIVE
INTERNAL POSITIVE CONTROL: POSITIVE
Lab: NORMAL

## 2017-03-22 PROCEDURE — 43239 EGD BIOPSY SINGLE/MULTIPLE: CPT | Performed by: SURGERY

## 2017-03-22 PROCEDURE — 45378 DIAGNOSTIC COLONOSCOPY: CPT | Performed by: SURGERY

## 2017-03-22 PROCEDURE — 25010000002 PROPOFOL 10 MG/ML EMULSION: Performed by: NURSE ANESTHETIST, CERTIFIED REGISTERED

## 2017-03-22 PROCEDURE — 88305 TISSUE EXAM BY PATHOLOGIST: CPT | Performed by: SURGERY

## 2017-03-22 RX ORDER — PROPOFOL 10 MG/ML
VIAL (ML) INTRAVENOUS AS NEEDED
Status: DISCONTINUED | OUTPATIENT
Start: 2017-03-22 | End: 2017-03-22 | Stop reason: SURG

## 2017-03-22 RX ORDER — PROPOFOL 10 MG/ML
VIAL (ML) INTRAVENOUS CONTINUOUS PRN
Status: DISCONTINUED | OUTPATIENT
Start: 2017-03-22 | End: 2017-03-22 | Stop reason: SURG

## 2017-03-22 RX ORDER — LIDOCAINE HYDROCHLORIDE 20 MG/ML
INJECTION, SOLUTION INFILTRATION; PERINEURAL AS NEEDED
Status: DISCONTINUED | OUTPATIENT
Start: 2017-03-22 | End: 2017-03-22 | Stop reason: SURG

## 2017-03-22 RX ORDER — SODIUM CHLORIDE 0.9 % (FLUSH) 0.9 %
3 SYRINGE (ML) INJECTION AS NEEDED
Status: DISCONTINUED | OUTPATIENT
Start: 2017-03-22 | End: 2017-03-22 | Stop reason: HOSPADM

## 2017-03-22 RX ORDER — SODIUM CHLORIDE, SODIUM LACTATE, POTASSIUM CHLORIDE, CALCIUM CHLORIDE 600; 310; 30; 20 MG/100ML; MG/100ML; MG/100ML; MG/100ML
1000 INJECTION, SOLUTION INTRAVENOUS CONTINUOUS PRN
Status: DISCONTINUED | OUTPATIENT
Start: 2017-03-22 | End: 2017-03-22 | Stop reason: HOSPADM

## 2017-03-22 RX ORDER — LIDOCAINE HYDROCHLORIDE 10 MG/ML
0.5 INJECTION, SOLUTION INFILTRATION; PERINEURAL ONCE AS NEEDED
Status: DISCONTINUED | OUTPATIENT
Start: 2017-03-22 | End: 2017-03-22 | Stop reason: HOSPADM

## 2017-03-22 RX ADMIN — SODIUM CHLORIDE, POTASSIUM CHLORIDE, SODIUM LACTATE AND CALCIUM CHLORIDE: 600; 310; 30; 20 INJECTION, SOLUTION INTRAVENOUS at 13:58

## 2017-03-22 RX ADMIN — PROPOFOL 100 MG: 10 INJECTION, EMULSION INTRAVENOUS at 14:01

## 2017-03-22 RX ADMIN — PROPOFOL 360 MCG/KG/MIN: 10 INJECTION, EMULSION INTRAVENOUS at 14:01

## 2017-03-22 RX ADMIN — SODIUM CHLORIDE, POTASSIUM CHLORIDE, SODIUM LACTATE AND CALCIUM CHLORIDE 1000 ML: 600; 310; 30; 20 INJECTION, SOLUTION INTRAVENOUS at 13:56

## 2017-03-22 RX ADMIN — LIDOCAINE HYDROCHLORIDE 40 MG: 20 INJECTION, SOLUTION INFILTRATION; PERINEURAL at 14:01

## 2017-03-22 NOTE — PLAN OF CARE
Problem: Patient Care Overview (Adult)  Goal: Plan of Care Review  Outcome: Ongoing (interventions implemented as appropriate)    03/22/17 1353   Coping/Psychosocial Response Interventions   Plan Of Care Reviewed With patient   Patient Care Overview   Progress progress toward functional goals as expected       Goal: Adult Individualization and Mutuality  Outcome: Ongoing (interventions implemented as appropriate)    03/22/17 1353   Individualization   Patient Specific Preferences Machelle       Goal: Discharge Needs Assessment  Outcome: Ongoing (interventions implemented as appropriate)    03/22/17 1353   Discharge Needs Assessment   Concerns To Be Addressed denies needs/concerns at this time   Discharge Disposition home or self-care   Living Environment   Transportation Available car         Problem: GI Endoscopy (Adult)  Goal: Signs and Symptoms of Listed Potential Problems Will be Absent or Manageable (GI Endoscopy)  Outcome: Ongoing (interventions implemented as appropriate)    03/22/17 1353   GI Endoscopy   Problems Assessed (GI Endoscopy) all   Problems Present (GI Endoscopy) (dysphagia; diarrhea)

## 2017-03-22 NOTE — ANESTHESIA PREPROCEDURE EVALUATION
Anesthesia Evaluation     Patient summary reviewed and Nursing notes reviewed   no history of anesthetic complications:  NPO Status: > 8 hours   Airway   Mallampati: II  TM distance: >3 FB  Neck ROM: full  no difficulty expected  Dental - normal exam     Pulmonary - normal exam   (+) asthma, shortness of breath, sleep apnea,   Cardiovascular - normal exam        Neuro/Psych  (+) numbness, psychiatric history Depression,    GI/Hepatic/Renal/Endo      Musculoskeletal     (+) myalgias, neck pain,   Abdominal  - normal exam   Substance History      OB/GYN          Other   (+) arthritis                                 Anesthesia Plan    ASA 3     MAC     intravenous induction   Anesthetic plan and risks discussed with patient.

## 2017-03-22 NOTE — OP NOTE
PREOPERATIVE DIAGNOSIS:  Generalized abdominal pain  Bloating  Constipation    POSTOPERATIVE DIAGNOSIS AND FINDINGS:  Normal upper and lower endoscopy    PROCEDURE:  EGD with biopsy of duodenum  Colonoscopy to cecum     SURGEON:  Mohit Natarajan MD    ANESTHESIA:  MAC    DESCRIPTION:  In decubitus position endoscope was inserted into the esophagus, stomach and duodenum. Antegrade and retroflex view of stomach performed.    There were no gross abnormalities of the first, second or third portions of the duodenum.  Biopsies of the distal duodenum were obtained to rule out celiac sprue.    Gastric antrum, body and retroflexed view of stomach were normal.    GE junction and esophagus were normal.    Digital rectal exam was normal. Colonoscope inserted under direct visualization of lumen to cecum confirmed by visualization of ileocecal valve and appendiceal orifice.    Scope slowly withdrawn circumferentially examining all mucosal surfaces.    Quality of bowel preparation good.    There were no mucosal abnormalities.     Scope withdrawal time: 6 minutes 1 second    RECOMMENDATION FOR FUTURE SURVEILLANCE:  10 year old colonoscopy, follow-up will be given regarding biopsy results    Mohit Natarajan M.D.

## 2017-03-23 LAB
CYTO UR: NORMAL
LAB AP CASE REPORT: NORMAL
Lab: NORMAL
PATH REPORT.FINAL DX SPEC: NORMAL
PATH REPORT.GROSS SPEC: NORMAL

## 2017-03-27 ENCOUNTER — TELEPHONE (OUTPATIENT)
Dept: SURGERY | Facility: CLINIC | Age: 52
End: 2017-03-27

## 2017-03-27 NOTE — TELEPHONE ENCOUNTER
----- Message from Mohit Natarajan MD sent at 3/24/2017 12:41 PM EDT -----  Please inform her that her EGD and colonoscopy (including biopsies) were normal.  Follow-up in 10 years recommended-put in computer for reminder.  Next step is to get the CT scans ordered by Dr. Benjamin.

## 2017-04-18 ENCOUNTER — HOSPITAL ENCOUNTER (OUTPATIENT)
Dept: CT IMAGING | Facility: HOSPITAL | Age: 52
Discharge: HOME | End: 2017-04-18
Attending: THORACIC SURGERY (CARDIOTHORACIC VASCULAR SURGERY) | Admitting: THORACIC SURGERY (CARDIOTHORACIC VASCULAR SURGERY)

## 2017-04-18 DIAGNOSIS — K44.9 DIAPHRAGMATIC HERNIA WITHOUT OBSTRUCTION AND WITHOUT GANGRENE: ICD-10-CM

## 2017-04-18 DIAGNOSIS — R06.02 SHORTNESS OF BREATH: ICD-10-CM

## 2017-04-18 LAB — CREAT BLDA-MCNC: 0.7 MG/DL (ref 0.6–1.3)

## 2017-04-18 PROCEDURE — 71260 CT THORAX DX C+: CPT

## 2017-04-18 PROCEDURE — 0 IOPAMIDOL 61 % SOLUTION: Performed by: THORACIC SURGERY (CARDIOTHORACIC VASCULAR SURGERY)

## 2017-04-18 PROCEDURE — 82565 ASSAY OF CREATININE: CPT

## 2017-04-18 RX ADMIN — IOPAMIDOL 75 ML: 612 INJECTION, SOLUTION INTRAVENOUS at 11:38

## 2017-05-01 ENCOUNTER — OFFICE VISIT (OUTPATIENT)
Dept: SURGERY | Facility: CLINIC | Age: 52
End: 2017-05-01

## 2017-05-01 VITALS
HEIGHT: 65 IN | SYSTOLIC BLOOD PRESSURE: 124 MMHG | WEIGHT: 181.8 LBS | HEART RATE: 72 BPM | BODY MASS INDEX: 30.29 KG/M2 | OXYGEN SATURATION: 97 % | RESPIRATION RATE: 16 BRPM | DIASTOLIC BLOOD PRESSURE: 62 MMHG

## 2017-05-01 DIAGNOSIS — R06.02 BREATH SHORTNESS: ICD-10-CM

## 2017-05-01 DIAGNOSIS — R05.8 DRY COUGH: ICD-10-CM

## 2017-05-01 DIAGNOSIS — K44.9 DIAPHRAGMATIC HERNIA WITHOUT OBSTRUCTION AND WITHOUT GANGRENE: Primary | ICD-10-CM

## 2017-05-01 DIAGNOSIS — R07.81 CHEST PAIN, PLEURITIC: ICD-10-CM

## 2017-05-01 PROCEDURE — 99213 OFFICE O/P EST LOW 20 MIN: CPT | Performed by: THORACIC SURGERY (CARDIOTHORACIC VASCULAR SURGERY)

## 2017-05-01 RX ORDER — IBUPROFEN 200 MG
200 TABLET ORAL EVERY 6 HOURS PRN
COMMUNITY
End: 2017-12-30 | Stop reason: HOSPADM

## 2017-05-02 ENCOUNTER — OFFICE VISIT (OUTPATIENT)
Dept: PAIN MEDICINE | Facility: CLINIC | Age: 52
End: 2017-05-02

## 2017-05-02 VITALS
HEIGHT: 65 IN | TEMPERATURE: 98.1 F | RESPIRATION RATE: 16 BRPM | HEART RATE: 74 BPM | WEIGHT: 179 LBS | SYSTOLIC BLOOD PRESSURE: 119 MMHG | OXYGEN SATURATION: 98 % | BODY MASS INDEX: 29.82 KG/M2 | DIASTOLIC BLOOD PRESSURE: 72 MMHG

## 2017-05-02 DIAGNOSIS — M96.1 POSTLAMINECTOMY SYNDROME, THORACIC: ICD-10-CM

## 2017-05-02 DIAGNOSIS — M54.6 PAIN IN THORACIC SPINE: ICD-10-CM

## 2017-05-02 DIAGNOSIS — M51.36 DDD (DEGENERATIVE DISC DISEASE), LUMBAR: ICD-10-CM

## 2017-05-02 DIAGNOSIS — G89.29 OTHER CHRONIC PAIN: Primary | ICD-10-CM

## 2017-05-02 DIAGNOSIS — M54.5 LOW BACK PAIN, UNSPECIFIED BACK PAIN LATERALITY, UNSPECIFIED CHRONICITY, WITH SCIATICA PRESENCE UNSPECIFIED: ICD-10-CM

## 2017-05-02 PROCEDURE — 99214 OFFICE O/P EST MOD 30 MIN: CPT | Performed by: NURSE PRACTITIONER

## 2017-05-10 ENCOUNTER — OFFICE VISIT (OUTPATIENT)
Dept: PAIN MEDICINE | Facility: CLINIC | Age: 52
End: 2017-05-10

## 2017-05-10 VITALS
HEART RATE: 69 BPM | TEMPERATURE: 98.2 F | RESPIRATION RATE: 18 BRPM | HEIGHT: 65 IN | DIASTOLIC BLOOD PRESSURE: 77 MMHG | OXYGEN SATURATION: 97 % | SYSTOLIC BLOOD PRESSURE: 114 MMHG

## 2017-05-10 DIAGNOSIS — M54.6 PAIN IN THORACIC SPINE: ICD-10-CM

## 2017-05-10 DIAGNOSIS — M96.1 POSTLAMINECTOMY SYNDROME, THORACIC: ICD-10-CM

## 2017-05-10 DIAGNOSIS — M54.5 LOW BACK PAIN, UNSPECIFIED BACK PAIN LATERALITY, UNSPECIFIED CHRONICITY, WITH SCIATICA PRESENCE UNSPECIFIED: ICD-10-CM

## 2017-05-10 DIAGNOSIS — M51.36 DDD (DEGENERATIVE DISC DISEASE), LUMBAR: ICD-10-CM

## 2017-05-10 DIAGNOSIS — G89.29 OTHER CHRONIC PAIN: Primary | ICD-10-CM

## 2017-05-10 PROCEDURE — 96372 THER/PROPH/DIAG INJ SC/IM: CPT | Performed by: NURSE PRACTITIONER

## 2017-05-10 PROCEDURE — 99214 OFFICE O/P EST MOD 30 MIN: CPT | Performed by: NURSE PRACTITIONER

## 2017-05-10 RX ORDER — KETOROLAC TROMETHAMINE 30 MG/ML
30 INJECTION, SOLUTION INTRAMUSCULAR; INTRAVENOUS ONCE
Status: COMPLETED | OUTPATIENT
Start: 2017-05-10 | End: 2017-05-10

## 2017-05-10 RX ORDER — METHYLPREDNISOLONE 4 MG/1
TABLET ORAL
Qty: 21 TABLET | Refills: 0 | Status: SHIPPED | OUTPATIENT
Start: 2017-05-10 | End: 2017-05-25

## 2017-05-10 RX ORDER — CYCLOBENZAPRINE HCL 10 MG
10 TABLET ORAL 2 TIMES DAILY PRN
Qty: 60 TABLET | Refills: 1 | Status: SHIPPED | OUTPATIENT
Start: 2017-05-10 | End: 2017-08-03 | Stop reason: SDUPTHER

## 2017-05-10 RX ORDER — HYDROCODONE BITARTRATE AND ACETAMINOPHEN 7.5; 325 MG/1; MG/1
1 TABLET ORAL EVERY 8 HOURS PRN
Qty: 40 TABLET | Refills: 0 | Status: SHIPPED | OUTPATIENT
Start: 2017-05-10 | End: 2017-12-30 | Stop reason: HOSPADM

## 2017-05-10 RX ADMIN — KETOROLAC TROMETHAMINE 30 MG: 30 INJECTION, SOLUTION INTRAMUSCULAR; INTRAVENOUS at 09:58

## 2017-05-25 ENCOUNTER — OFFICE VISIT (OUTPATIENT)
Dept: NEUROSURGERY | Facility: CLINIC | Age: 52
End: 2017-05-25

## 2017-05-25 VITALS
DIASTOLIC BLOOD PRESSURE: 64 MMHG | BODY MASS INDEX: 30.49 KG/M2 | WEIGHT: 183 LBS | SYSTOLIC BLOOD PRESSURE: 112 MMHG | HEIGHT: 65 IN | HEART RATE: 70 BPM

## 2017-05-25 DIAGNOSIS — R26.9 GAIT DIFFICULTY: ICD-10-CM

## 2017-05-25 DIAGNOSIS — Z86.018 HISTORY OF BENIGN SCHWANNOMA: ICD-10-CM

## 2017-05-25 DIAGNOSIS — M54.6 PAIN IN THORACIC SPINE: ICD-10-CM

## 2017-05-25 DIAGNOSIS — M54.16 LUMBAR RADICULOPATHY: ICD-10-CM

## 2017-05-25 DIAGNOSIS — R20.2 NUMBNESS AND TINGLING: Primary | ICD-10-CM

## 2017-05-25 DIAGNOSIS — R20.0 NUMBNESS AND TINGLING: Primary | ICD-10-CM

## 2017-05-25 PROCEDURE — 99214 OFFICE O/P EST MOD 30 MIN: CPT | Performed by: NURSE PRACTITIONER

## 2017-06-01 ENCOUNTER — HOSPITAL ENCOUNTER (OUTPATIENT)
Dept: MRI IMAGING | Facility: HOSPITAL | Age: 52
Discharge: HOME | End: 2017-06-01
Admitting: NURSE PRACTITIONER

## 2017-06-01 ENCOUNTER — HOSPITAL ENCOUNTER (OUTPATIENT)
Dept: MRI IMAGING | Facility: HOSPITAL | Age: 52
Discharge: HOME | End: 2017-06-01

## 2017-06-01 DIAGNOSIS — R26.9 GAIT DIFFICULTY: ICD-10-CM

## 2017-06-01 DIAGNOSIS — Z86.018 HISTORY OF BENIGN SCHWANNOMA: ICD-10-CM

## 2017-06-01 DIAGNOSIS — R20.2 NUMBNESS AND TINGLING: ICD-10-CM

## 2017-06-01 DIAGNOSIS — M54.16 LUMBAR RADICULOPATHY: ICD-10-CM

## 2017-06-01 DIAGNOSIS — R20.0 NUMBNESS AND TINGLING: ICD-10-CM

## 2017-06-01 DIAGNOSIS — M54.6 PAIN IN THORACIC SPINE: ICD-10-CM

## 2017-06-01 PROCEDURE — 72158 MRI LUMBAR SPINE W/O & W/DYE: CPT

## 2017-06-01 PROCEDURE — 72157 MRI CHEST SPINE W/O & W/DYE: CPT

## 2017-06-01 PROCEDURE — 0 GADOBENATE DIMEGLUMINE 529 MG/ML SOLUTION: Performed by: NURSE PRACTITIONER

## 2017-06-01 PROCEDURE — 82565 ASSAY OF CREATININE: CPT

## 2017-06-01 PROCEDURE — A9577 INJ MULTIHANCE: HCPCS | Performed by: NURSE PRACTITIONER

## 2017-06-01 RX ADMIN — GADOBENATE DIMEGLUMINE 17 ML: 529 INJECTION, SOLUTION INTRAVENOUS at 20:15

## 2017-06-02 LAB — CREAT BLDA-MCNC: 0.8 MG/DL (ref 0.6–1.3)

## 2017-06-03 DIAGNOSIS — K21.9 GASTROESOPHAGEAL REFLUX DISEASE, ESOPHAGITIS PRESENCE NOT SPECIFIED: ICD-10-CM

## 2017-06-05 RX ORDER — PANTOPRAZOLE SODIUM 40 MG/1
TABLET, DELAYED RELEASE ORAL
Qty: 90 TABLET | Refills: 1 | Status: SHIPPED | OUTPATIENT
Start: 2017-06-05 | End: 2017-12-02 | Stop reason: SDUPTHER

## 2017-06-14 ENCOUNTER — OFFICE VISIT (OUTPATIENT)
Dept: PAIN MEDICINE | Facility: CLINIC | Age: 52
End: 2017-06-14

## 2017-06-14 VITALS
HEIGHT: 65 IN | DIASTOLIC BLOOD PRESSURE: 74 MMHG | SYSTOLIC BLOOD PRESSURE: 114 MMHG | OXYGEN SATURATION: 100 % | RESPIRATION RATE: 18 BRPM | TEMPERATURE: 98.3 F | HEART RATE: 67 BPM

## 2017-06-14 DIAGNOSIS — G89.29 OTHER CHRONIC PAIN: Primary | ICD-10-CM

## 2017-06-14 DIAGNOSIS — M96.1 POSTLAMINECTOMY SYNDROME, THORACIC: ICD-10-CM

## 2017-06-14 DIAGNOSIS — M51.36 DDD (DEGENERATIVE DISC DISEASE), LUMBAR: ICD-10-CM

## 2017-06-14 PROCEDURE — 99215 OFFICE O/P EST HI 40 MIN: CPT | Performed by: ANESTHESIOLOGY

## 2017-06-14 RX ORDER — BUPROPION HYDROCHLORIDE 300 MG/1
300 TABLET ORAL EVERY EVENING
COMMUNITY
Start: 2017-06-06 | End: 2018-10-03 | Stop reason: SDUPTHER

## 2017-06-14 NOTE — PROGRESS NOTES
CHIEF COMPLAINT  Follow-up for back pain. Ms. Ortega states that her back pain has improved a little since her last appt.  She still describes her pain as severe, and interfering greatly with ADLs.      Subjective   Machelle Ortega is a 51 y.o. female  who presents to the office for follow-up.She has a history of chronic neck and low back pain.  She was introduced to IT pump therapy at the previous office visit by GOMEZ Goff and presents today to discuss this therapy in further detail with Dr. London prior to proceeding with a trial.      She was seen at [facility] and the evaluation was appropriate.        Back Pain   This is a chronic problem. The current episode started more than 1 year ago. The problem occurs constantly. The problem has been rapidly worsening since onset. The pain is present in the gluteal and thoracic spine. The quality of the pain is described as aching, burning, shooting and stabbing. The pain radiates to the left foot, left knee, left thigh, right foot and right thigh. The pain is at a severity of 8/10. The pain is the same all the time. The symptoms are aggravated by position, sitting and standing. Stiffness is present all day. Associated symptoms include bladder incontinence, bowel incontinence, leg pain, numbness, paresthesias, tingling, weakness and weight loss. Pertinent negatives include no abdominal pain, chest pain, dysuria, fever, headaches, paresis, pelvic pain or perianal numbness. Risk factors include lack of exercise, menopause, obesity, poor posture, recent trauma and sedentary lifestyle.        PEG Assessment   What number best describes your pain on average in the past week?6  What number best describes how, during the past week, pain has interfered with your enjoyment of life?9  What number best describes how, during the past week, pain has interfered with your general activity?  8      The following portions of the patient's history were reviewed and  "updated as appropriate: allergies, current medications, past family history, past medical history, past social history, past surgical history and problem list.    Review of Systems   Constitutional: Positive for weight loss. Negative for fever.   HENT: Positive for congestion.    Eyes: Negative for visual disturbance.   Respiratory: Negative for cough, shortness of breath and wheezing.    Cardiovascular: Negative for chest pain.   Gastrointestinal: Positive for bowel incontinence. Negative for abdominal pain.   Genitourinary: Positive for bladder incontinence. Negative for dysuria and pelvic pain.   Musculoskeletal: Positive for back pain.   Neurological: Positive for tingling, weakness, numbness and paresthesias. Negative for headaches.   Psychiatric/Behavioral: Positive for sleep disturbance. Negative for suicidal ideas. The patient is nervous/anxious.        From the last page of the [employer] [facility] eval....   Of note, they did mention IT pump, but this eval is appropriate for either form of advanced neuromodulation.      Vitals:    06/14/17 1334   BP: 114/74   Pulse: 67   Resp: 18   Temp: 98.3 °F (36.8 °C)   SpO2: 100%   Height: 64.5\" (163.8 cm)   PainSc: 6  Comment: back pain 5/10   PainLoc: Leg         Objective   Physical Exam        Assessment/Plan   Machelle was seen today for back pain.    Diagnoses and all orders for this visit:    Other chronic pain    DDD (degenerative disc disease), lumbar  -     Case Request    Postlaminectomy syndrome, thoracic  -     Case Request      This education session was extended, and details are outlined below.  This was a 45 minute office visit with 35 minutes in education and counseling.      After weighing all options, the patient would like to consider a trial of SCS therapy.  Seek auth for an SCS trial with [employer].  We are considering this because of [employer], as she does have some history of neck issues, and we might need to extend the therapy cephalad in " the future.    --- Follow-up for SCS trialing...    -- needs an education session with [employer]    -- we discussed the complex management.... We will try a percutaneous trial.  If the perc trial fails or has to be aborted, and we discussed the possibility that leads might not be able to negotiate the T10-11 area, then we could consider a paddle trial at the discretion of the neurosurgeon.  If perc trial is successful, we may still consider a permanent implant with a paddle lead.  Alternatively, we could trial intrathecal medication and reconsider an intrathecal pump.         -------  Education:  Spinal Cord Stimulation Therapy versus Intrathecal Pump Therapy    We entered into discussion and education session about these two different forms of advanced neuromodulation.  We compared & contrasted the therapies.     IT pump Therapy involves the placing a catheter into the intrathecal space, which holds the spinal fluid, and in which the spinal cord resides, and using the catheter to infuse medication directly into the spinal fluid bathing the spinal cord.      SCS therapy involves placing catheters containing electrical wires, and electrical contacts on the distal end of each catheter, to place an electromagnetic shield onto neural structures to attempt to block pain transmissions through the central nervous system.      Risks of both include but are not limited to bleeding & infection & injury & risk of neural injury or paralysis or coma or death, and risks of failure of the electronic devices, risks of worsening of clinical picture.    Intrathecal pump therapy risks include risks of increasing and pain as well as withdrawal symptoms depending on drug choice if the system fails.  Risks of granuloma.  Risk of catheter fracture.  Risk of drug side effects specific to each drug and risk of overdose and death.    Spinal cord stimulation does not involve drug infusion.  There is risk of electrical shock.  There is  no risk of drug overdose.  There is risk of lead migration, which can usually be overcome by reprogramming, but may require repositioning of the leads.      In general, I feel that SCS therapy offers the chance of higher degrees of pain relief and less risk of side effects due to a lack of drug use.  In general, I feel that SCS therapy is preferable when there is no contraindications.  Intrathecal pump therapy may be a very reasonable alternative if SCS trialing does not prove effective or if SCS therapy is failing to provide desired results.  We discussed reasonable expectations.      ---------    ----------  Education about SCS therapy:    -  This was an extended office visit in which we entered into discussion about advanced pain relieving techniques, and discussed implantable pain therapies.  We discussed advanced neuromodulation in the form of Spinal Cord Stimulation.  This is a reasonable therapy for patients who have exhausted basic nonnarcotic options, basic modalities and physical therapies, and do not have any other reasonable surgical options.  This therapy as an alternative to long term high dose opioid therapy.    -  Risks include but are not limited to bleeding, infection, injury, paralysis, nerve injury, dural puncture, and risk for postprocedural pain.  Implanted equipment risks include but are not limited to lead migration, lead fracture, risk of loss of pain relieving stimulation, risk of electrical shock, and risk of system failure.    - We discussed the theory and basic science behind SCS therapy including but not limited to energy delivery and relevant anatomy, in terms that are easy to understand and also with use of illustrative devices.  Spinal Cord Stimulation therapies apply an electromagnetic field to a specific area on the spinal cord (Dorsal Column) to attempt to block transmission of painful signals from the peripheral nerves to the brain.    -  We discussed that prior to trialing, I  request that patients review relevant materials and perform some research, and also have a follow up education session with a device specialist from the .  Also, insurance requires a presurgical psychological evaluation.  When these have been completed, and all the patient's questions have been answered to their satisfaction, then we will plan to request authorization for trialing.   - We discussed the trialing process (aka Phase 1)  that usually lasts a week, and the temporary nature of this trial.  Trial success will determine whether or not we proceed to implant.  We discussed reasonable expectations, and that I feel that consistent 50% pain relief is medically successful and is a reasonable expectation to justify moving forward to permanent implant.    -  Additional risks of Phase 1 include but are not limited to bleeding on insertion, bleeding on lead removal, and procedural site soreness.  - We discussed the percutaneous surgical implant, including postsurgical restrictions, risks, and alternatives.   For spinal cord stimulation implanted device (aka SCS Phase 2) there is usually a midline vertical incision for the spinally implanted leads, and also a horizontal incision in the posterior lumbar flank for implantation of the battery & computer (aka IPG).  The leads are tunneled from the midline incision to the medial aspect of the battery pocket incision.    -  Postoperative restrictions include limiting the following activity as much as possible for 90 days:  Lifting >10 lbs, bending at the waist, stretching/reaching overhead, and twisting.  ----------        DIVINE REPORT    As part of the patient's treatment plan, I am prescribing controlled substances. The patient has been made aware of appropriate use of such medications, including potential risk of somnolence, limited ability to drive and/or work safely, and the potential for dependence or overdose. It has also bee made clear that these  medications are for use by this patient only, without concomitant use of alcohol or other substances unless prescribed.     Patient has completed prescribing agreement detailing terms of continued prescribing of controlled substances, including monitoring DIVINE reports, urine drug screening, and pill counts if necessary. The patient is aware that inappropriate use will results in cessation of prescribing such medications.    DIVINE report has been reviewed and scanned into the patient's chart.    Date of last DIVINE : 6-    History and physical exam exhibit continued safe and appropriate use of controlled substances.      EMR Dragon/Transcription disclaimer:   Much of this encounter note is an electronic transcription/translation of spoken language to printed text. The electronic translation of spoken language may permit erroneous, or at times, nonsensical words or phrases to be inadvertently transcribed; Although I have reviewed the note for such errors, some may still exist.

## 2017-07-17 ENCOUNTER — OFFICE VISIT (OUTPATIENT)
Dept: NEUROSURGERY | Facility: CLINIC | Age: 52
End: 2017-07-17

## 2017-07-17 VITALS
HEIGHT: 65 IN | HEART RATE: 62 BPM | SYSTOLIC BLOOD PRESSURE: 116 MMHG | BODY MASS INDEX: 30.82 KG/M2 | DIASTOLIC BLOOD PRESSURE: 80 MMHG | WEIGHT: 185 LBS

## 2017-07-17 DIAGNOSIS — M51.36 DDD (DEGENERATIVE DISC DISEASE), LUMBAR: ICD-10-CM

## 2017-07-17 DIAGNOSIS — M54.16 LUMBAR RADICULOPATHY: ICD-10-CM

## 2017-07-17 DIAGNOSIS — M54.6 PAIN IN THORACIC SPINE: Primary | ICD-10-CM

## 2017-07-17 PROCEDURE — 99213 OFFICE O/P EST LOW 20 MIN: CPT | Performed by: NEUROLOGICAL SURGERY

## 2017-07-17 NOTE — PROGRESS NOTES
Subjective   Patient ID: Machelle Ortega is a 51 y.o. female is here today for follow-up of back pain with new MRI of the lumbar and thoracic spine.    The patient denies any changes to her back and bilateral leg pain since her last visit.     The patient would like to discuss her recent appointment with Dr. London as well.    Back Pain   This is a chronic problem. The current episode started more than 1 month ago. The problem occurs constantly. The problem has been waxing and waning since onset. The pain is present in the gluteal. The quality of the pain is described as aching, burning, shooting and stabbing. The pain radiates to the left thigh and right thigh. The pain is at a severity of 7/10. The pain is the same all the time. The symptoms are aggravated by sitting, standing and stress. Stiffness is present all day. Associated symptoms include abdominal pain, bladder incontinence, bowel incontinence, leg pain, numbness, paresthesias, pelvic pain, perianal numbness, tingling, weakness and weight loss. Pertinent negatives include no chest pain, dysuria, fever or headaches. Risk factors include menopause, poor posture and sedentary lifestyle.       The following portions of the patient's history were reviewed and updated as appropriate: allergies, current medications, past family history, past medical history, past social history, past surgical history and problem list.    Review of Systems   Constitutional: Positive for weight loss. Negative for fever.   Cardiovascular: Negative for chest pain.   Gastrointestinal: Positive for abdominal pain and bowel incontinence.   Genitourinary: Positive for bladder incontinence and pelvic pain. Negative for dysuria.   Musculoskeletal: Positive for back pain.   Neurological: Positive for tingling, weakness, numbness and paresthesias. Negative for headaches.   All other systems reviewed and are negative.    She is with her .  She had been seen by our nurse practitioner.   She been having more leg pain and weakness.  The MRI was reviewed.  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  No evidence of severe stenosis in the lumbar spine.  She is quite miserable in the back and both buttocks and both legs.  The option of putting and a pain pump was discussed with Dr. London.  He rejected that.  He is discussed the possibility of doing a spinal cord stimulator.  But given the fact that she's had lower thoracic intradural surgery, I'm doubtful that he did get Stephane past that level.  He is try to get it approved.  But if it is approved I think an open surgical lead to about T9 to incorporate both back and the legs were assessed option.  I'll discuss it with him and let her know what we concluded.        Objective   Physical Exam   Constitutional: She is oriented to person, place, and time. She appears well-developed and well-nourished.   HENT:   Head: Normocephalic and atraumatic.   Eyes: Conjunctivae and EOM are normal. Pupils are equal, round, and reactive to light.   Fundoscopic exam:       The right eye shows no papilledema. The right eye shows venous pulsations.        The left eye shows no papilledema. The left eye shows venous pulsations.   Neck: Carotid bruit is not present.   Neurological: She is oriented to person, place, and time. She has a normal Finger-Nose-Finger Test and a normal Heel to Shin Test. Gait normal.   Reflex Scores:       Tricep reflexes are 2+ on the right side and 2+ on the left side.       Bicep reflexes are 2+ on the right side and 2+ on the left side.       Brachioradialis reflexes are 2+ on the right side and 2+ on the left side.       Patellar reflexes are 2+ on the right side and 2+ on the left side.       Achilles reflexes are 2+ on the right side and 2+ on the left side.  Psychiatric: Her speech is normal.     Neurologic Exam     Mental Status   Oriented to person, place, and time.   Registration of memory: Good recent and remote memory.    Attention: normal. Concentration: normal.   Speech: speech is normal   Level of consciousness: alert  Knowledge: consistent with education.     Cranial Nerves     CN II   Visual fields full to confrontation.   Visual acuity: normal    CN III, IV, VI   Pupils are equal, round, and reactive to light.  Extraocular motions are normal.     CN V   Facial sensation intact.   Right corneal reflex: normal  Left corneal reflex: normal    CN VII   Facial expression full, symmetric.   Right facial weakness: none  Left facial weakness: none    CN VIII   Hearing: intact    CN IX, X   Palate: symmetric    CN XI   Right sternocleidomastoid strength: normal  Left sternocleidomastoid strength: normal    CN XII   Tongue: not atrophic  Tongue deviation: none    Motor Exam   Muscle bulk: normal  Right arm tone: normal  Left arm tone: normal  Right leg tone: normal  Left leg tone: normal    Strength   Strength 5/5 except as noted.     Sensory Exam   Light touch normal.     Gait, Coordination, and Reflexes     Gait  Gait: normal    Coordination   Finger to nose coordination: normal  Heel to shin coordination: normal    Reflexes   Right brachioradialis: 2+  Left brachioradialis: 2+  Right biceps: 2+  Left biceps: 2+  Right triceps: 2+  Left triceps: 2+  Right patellar: 2+  Left patellar: 2+  Right achilles: 2+  Left achilles: 2+  Right : 2+  Left : 2+      Assessment/Plan   Independent Review of Radiographic Studies:    The thoracic MRI done 6/1/17 did not show any evidence of any tumor recurrence.  There is no severe thoracic stenosis.  The lumbar MRI also done 6/1/17 didn't show any severe stenosis either.  There is facet overgrowth and disc degeneration.  Agree with the report.      Medical Decision Making:    A spinal cord stimulator isn't unreasonable.  I will speak with Dr. London about this.  She is far enough out from her tumor surgery in the spine that is not likely she'll have a recurrence and so therefore and  inability to have an MRI is not a major problem.  She is more likely to have progression of symptoms of back pain and leg pain from her degenerative disc disease.  I would not try a percutaneous trial because the lead will not be able to advance past the lower thoracic region because of the scar tissue from the surgery by Dr. Phelan.  I'll speak with Dr. London about doing a surgical lead and trial.  Apparently they're trying to get the whole process approved by her insurance.      Machelle was seen today for back pain.    Diagnoses and all orders for this visit:    Pain in thoracic spine    Lumbar radiculopathy    DDD (degenerative disc disease), lumbar      Return for I'll speak to her once I have contacted Dr. London.

## 2017-07-25 ENCOUNTER — TELEPHONE (OUTPATIENT)
Dept: NEUROSURGERY | Facility: CLINIC | Age: 52
End: 2017-07-25

## 2017-07-25 NOTE — TELEPHONE ENCOUNTER
Tell her I communicated with Dr. London. He will do the trial from the top down, away from the site of the previous tumor surgery. That's ok with me. She'll see me after the trial.

## 2017-08-03 RX ORDER — CYCLOBENZAPRINE HCL 10 MG
TABLET ORAL
Qty: 60 TABLET | Refills: 1 | Status: SHIPPED | OUTPATIENT
Start: 2017-08-03 | End: 2017-12-22 | Stop reason: SDUPTHER

## 2017-08-28 ENCOUNTER — TELEPHONE (OUTPATIENT)
Dept: NEUROSURGERY | Facility: CLINIC | Age: 52
End: 2017-08-28

## 2017-08-28 NOTE — TELEPHONE ENCOUNTER
The patient notes that she finally has authorization from Dr. London's office to proceed with a trial.    I left her a message explaining that she should proceed with trial and testing from Dr. London's office and then she will need to f/u with Dr. Jarrett for SCS insertion conversation.     She will call back with any other questions or concerns.

## 2017-09-01 ENCOUNTER — HOSPITAL ENCOUNTER (OUTPATIENT)
Dept: CT IMAGING | Facility: HOSPITAL | Age: 52
Discharge: HOME | End: 2017-09-01
Attending: THORACIC SURGERY (CARDIOTHORACIC VASCULAR SURGERY) | Admitting: THORACIC SURGERY (CARDIOTHORACIC VASCULAR SURGERY)

## 2017-09-01 DIAGNOSIS — R07.81 CHEST PAIN, PLEURITIC: ICD-10-CM

## 2017-09-01 DIAGNOSIS — K44.9 DIAPHRAGMATIC HERNIA WITHOUT OBSTRUCTION AND WITHOUT GANGRENE: ICD-10-CM

## 2017-09-01 DIAGNOSIS — R05.8 DRY COUGH: ICD-10-CM

## 2017-09-01 LAB — CREAT BLDA-MCNC: 0.9 MG/DL (ref 0.6–1.3)

## 2017-09-01 PROCEDURE — 0 IOPAMIDOL 61 % SOLUTION: Performed by: THORACIC SURGERY (CARDIOTHORACIC VASCULAR SURGERY)

## 2017-09-01 PROCEDURE — 71260 CT THORAX DX C+: CPT

## 2017-09-01 PROCEDURE — 82565 ASSAY OF CREATININE: CPT

## 2017-09-01 RX ADMIN — IOPAMIDOL 75 ML: 612 INJECTION, SOLUTION INTRAVENOUS at 08:30

## 2017-09-18 ENCOUNTER — OFFICE VISIT (OUTPATIENT)
Dept: SURGERY | Facility: CLINIC | Age: 52
End: 2017-09-18

## 2017-09-18 VITALS
DIASTOLIC BLOOD PRESSURE: 66 MMHG | SYSTOLIC BLOOD PRESSURE: 112 MMHG | HEIGHT: 65 IN | WEIGHT: 182.2 LBS | OXYGEN SATURATION: 99 % | HEART RATE: 65 BPM | BODY MASS INDEX: 30.35 KG/M2

## 2017-09-18 DIAGNOSIS — K44.9 DIAPHRAGMATIC HERNIA WITHOUT OBSTRUCTION AND WITHOUT GANGRENE: ICD-10-CM

## 2017-09-18 DIAGNOSIS — R07.81 CHEST PAIN, PLEURITIC: ICD-10-CM

## 2017-09-18 DIAGNOSIS — R05.8 DRY COUGH: Primary | ICD-10-CM

## 2017-09-18 PROCEDURE — 99213 OFFICE O/P EST LOW 20 MIN: CPT | Performed by: THORACIC SURGERY (CARDIOTHORACIC VASCULAR SURGERY)

## 2017-09-18 NOTE — PROGRESS NOTES
Subjective   Patient ID: Machelle Ortega is a 51 y.o. female is here today for follow-up further evaluation and continued surveillance of her small right diaphragmatic hernia..    History of Present Illness  Dear Colleague,  Machelle Ortega was seen in our office today for continued follow up and surveillance of her recurrent right diaphragmatic hernia.  She continues to have some pleuritic chest pain which is made worse with coughing or taking a deep breath.  The pain is a sharp stabbing pain that radiates from the right chest wall anteriorly along the  dermatome of her previous right thoracotomy incision.  This pain usually resolves spontaneously and she is not currently taking any pain medications for this.  She is currently being evaluated for a spinal stimulator for her chronic back pain issues.  She denies any complaints of fever, chills, hemoptysis, shortness of air, dyspnea with exertion, night sweats, hoarseness, or unintentional weight loss. Underlying medical conditions including IBS, GERD and cervical pain remain stable.  She has no other somatic complaints or alleviating or exacerbating factors aside from those mentioned above.    The following portions of the patient's history were reviewed and updated as appropriate: allergies, current medications, past family history, past medical history, past social history, past surgical history and problem list.  Review of Systems   Constitution: Negative.   HENT: Negative.    Eyes: Negative.    Cardiovascular: Positive for chest pain.        Right side chest shoulder  pain   Respiratory: Positive for cough.         Non productive   Endocrine: Negative.    Hematologic/Lymphatic: Negative.    Skin: Negative.    Musculoskeletal: Negative.    Gastrointestinal: Negative.    Genitourinary: Negative.    Neurological: Negative.    Psychiatric/Behavioral: Negative.      Patient Active Problem List   Diagnosis   • Abdominal pain   • Ataxic gait   • Cervical pain   • Chest pain,  pleuritic   • DDD (degenerative disc disease), cervical   • DDD (degenerative disc disease), lumbar   • Dry cough   • Extremity pain   • Low back pain   • Chronic nausea   • Loss of feeling or sensation   • Pain in thoracic spine   • Pleural cavity effusion   • Breath shortness   • Cervical spinal stenosis   • IBS (irritable bowel syndrome)   • GERD (gastroesophageal reflux disease)   • Multiple joint complaints   • Attention deficit hyperactivity disorder   • Fibromyalgia   • Mood disorder   • Fatigue   • Sleep apnea   • Hyperlipidemia   • History of benign schwannoma   • Other cervical disc degeneration, high cervical region   • Chronic pain   • Postlaminectomy syndrome, thoracic   • Diaphragmatic hernia without obstruction and without gangrene   • Lumbar radiculopathy     Past Medical History:   Diagnosis Date   • Anxiety    • Arthritis    • Asthma Seasonally   • Attention deficit hyperactivity disorder    • Chronic pain    • Claustrophobia    • Depression    • Fatigue    • Fibrocystic breast    • Fibromyalgia    • Fibromyositis    • GERD (gastroesophageal reflux disease)    • Hyperlipidemia    • IBS (irritable bowel syndrome)    • Infectious mononucleosis    • Iron deficiency anemia    • Lumbago    • Mood disorder    • Sleep apnea     Compliant w/ C-Pap     Past Surgical History:   Procedure Laterality Date   • APPENDECTOMY  2014    Emergency performed by [facility]   • BLADDER NECK RECONSTRUCTION N/A    • BREAST BIOPSY  Several    On both sides   • BREAST CYST ASPIRATION     • BREAST CYST ASPIRATION     • BRONCHOSCOPY N/A 06/04/2014    Chronic cough with abnormal chest x-ray, the exam was normal, fluoroscopically guided transbronchial brushings were obtained, transbronchial lung biopsies were performed, bronchoalveolar lavage was performed-Dr. Vitaliy Schafer   • CERVICAL CORPECTOMY N/A 02/13/2014    Anterior cervical corpectomy at C4 and C5; Anterior cervical arthrodesis at C3-C4, C4-C5 and C5-C6; placement of  instrumentation; use of intraoperative microscope for microdissection; harvest of vertebral body autograft through same incision; prep of morcellized allograft-Dr. Dayron Jarrett   •  SECTION N/A 2007    Dr. Briana Mcdaniel   • COLONOSCOPY N/A 3/22/2017    Procedure: COLONOSCOPY TO CECUM ;  Surgeon: Mohit Natarajan MD;  Location: [facility] ENDOSCOPY;  Service:    • D&C HYSTEROSCOPY N/A 2005    Hysteroscopy, D&C, and paracervical block-Dr. Antionette Herndon   • D&C HYSTEROSCOPY ENDOMETRIAL ABLATION N/A 09/15/2014    Hysteroscopy, D&C, NovaSure ablation-Dr. Antionette Herndon   • ENDOSCOPY N/A 3/22/2017    Procedure: ESOPHAGOGASTRODUODENOSCOPY WITH BX ;  Surgeon: Mohit Natarajan MD;  Location: [facility] ENDOSCOPY;  Service:    • EYE SURGERY      Lasik   • LAPAROSCOPIC APPENDECTOMY N/A 05/10/2014    Dr. Mohit Natarajan   • LUMBAR SPINAL TUMOR REMOVAL N/A     spinal tumor   T10/11 schwannoma   • LUNG LOBECTOMY Right 2014    Exploratory bronchoscopy, exploratory right video-assisted thoracoscopy, parietal pleural biopsy, diaphragmatic biopsy, wedge resection of right lower lobe, excision of ectopic hepatic lobe, primary repair of diagphragm, subpleural pain catheters x2, mechanical pleurodesis-Dr. Darryl Benjamin     Family History   Problem Relation Age of Onset   • Alcohol abuse Other    • Arthritis Other    • Diabetes Other    • Drug abuse Other    • Hepatitis Other    • Hypertension Other    • Depression Mother    • Lung cancer Mother    • Thyroid disease Mother    • Arthritis Mother    • Cancer Mother      Lung cancer 2016   • Diabetes Mother    • Hyperlipidemia Mother    • Hypertension Mother    • Miscarriages / Stillbirths Mother    • Colon polyps Mother    • Hashimoto's thyroiditis Daughter    • Breast cancer Maternal Aunt    • Prostate cancer Maternal Uncle    • Cancer Maternal Uncle    • Heart disease Paternal Grandmother    • Alcohol abuse Paternal Grandmother    • Asthma Paternal  Grandmother    • Depression Paternal Grandmother    • Alzheimer's disease Paternal Grandfather    • Alcohol abuse Paternal Grandfather    • Depression Paternal Grandfather    • Alcohol abuse Brother    • Arthritis Brother    • Depression Brother    • Drug abuse Brother      Marijuana for chronic pain   • Learning disabilities Brother      ADHD   • Arthritis Maternal Grandmother    • Depression Maternal Grandmother    • Diabetes Maternal Grandmother    • Heart disease Maternal Grandmother    • Hyperlipidemia Maternal Grandmother    • Hypertension Maternal Grandmother    • Birth defects Maternal Uncle      CP   • Hyperlipidemia Maternal Uncle    • Cancer Maternal Grandfather      Kidney, bone cancer 1980's   • Cancer Maternal Aunt      Breast cancer 2003   • Depression Maternal Aunt    • Miscarriages / Stillbirths Maternal Aunt    • Cancer Maternal Aunt      Lung cancer 2015   • Depression Maternal Aunt    • Diabetes Maternal Aunt    • Cancer Maternal Uncle      Prostate Cancer   • Cancer Maternal Uncle      Thymus and Pancreatic Cancer   • Depression Father    • Diabetes Father    • Hyperlipidemia Father    • Colon polyps Father    • Depression Daughter    • Learning disabilities Daughter      ADHD   • Diabetes Maternal Aunt      Pancreatic Insulinoma removed   • Learning disabilities Son      ADHD, SPD     Social History     Social History   • Marital status:      Spouse name: N/A   • Number of children: N/A   • Years of education: N/A     Occupational History   • Not on file.     Social History Main Topics   • Smoking status: Never Smoker   • Smokeless tobacco: Never Used   • Alcohol use Yes      Comment: Socially on holidays   • Drug use: Yes     Special: Hydrocodone   • Sexual activity: Yes     Partners: Male     Birth control/ protection: None     Other Topics Concern   • Not on file     Social History Narrative       Current Outpatient Prescriptions:   •  amphetamine-dextroamphetamine (ADDERALL) 20 MG  tablet, Take 40 mg by mouth Daily., Disp: , Rfl:   •  buPROPion XL (WELLBUTRIN XL) 300 MG 24 hr tablet, , Disp: , Rfl:   •  cyclobenzaprine (FLEXERIL) 10 MG tablet, TAKE 1 TABLET BY MOUTH 2 (TWO) TIMES A DAY AS NEEDED FOR MUSCLE SPASMS., Disp: 60 tablet, Rfl: 1  •  diclofenac (VOLTAREN) 50 MG EC tablet, TAKE 1 TABLET BY MOUTH TWICE DAILY AS NEEDED FOR PAIN, Disp: 60 tablet, Rfl: 2  •  FLUoxetine (PROzac) 20 MG capsule, Take 60 mg by mouth Every Night., Disp: , Rfl:   •  gabapentin (NEURONTIN) 400 MG capsule, Take 1 capsule by mouth 3 (Three) Times a Day., Disp: 90 capsule, Rfl: 1  •  HYDROcodone-acetaminophen (NORCO) 7.5-325 MG per tablet, Take 1 tablet by mouth Every 8 (Eight) Hours As Needed for Moderate Pain (4-6)., Disp: 40 tablet, Rfl: 0  •  ibuprofen (ADVIL,MOTRIN) 200 MG tablet, Take 200 mg by mouth Every 6 (Six) Hours As Needed for Mild Pain (1-3)., Disp: , Rfl:   •  lamoTRIgine (LaMICtal) 200 MG tablet, Take 200 mg by mouth daily., Disp: , Rfl: 0  •  montelukast (SINGULAIR) 4 MG pack, Take 4 mg by mouth Every Night., Disp: , Rfl:   •  pantoprazole (PROTONIX) 40 MG EC tablet, TAKE 1 TABLET BY MOUTH EVERY DAY, Disp: 90 tablet, Rfl: 1  •  traZODone (DESYREL) 100 MG tablet, Take 300 mg by mouth Daily., Disp: , Rfl:   Allergies   Allergen Reactions   • Budesonide-Formoterol Fumarate Swelling     Throat   • Oxycodone-Acetaminophen    • Oxycodone-Acetaminophen Hives   • Pregabalin Other (See Comments)     Weight gain        Objective   Vitals:    09/18/17 0956   BP: 112/66   Pulse: 65   SpO2: 99%     Physical Exam   Constitutional: She is oriented to person, place, and time. Vital signs are normal. She appears well-developed and well-nourished.   HENT:   Head: Normocephalic and atraumatic.   Eyes: EOM are normal. Pupils are equal, round, and reactive to light.   Neck: Normal range of motion. Neck supple. No JVD present. No tracheal deviation present.   Cardiovascular: Normal rate, regular rhythm, normal heart  sounds and intact distal pulses.    No murmur heard.  Pulmonary/Chest: Effort normal and breath sounds normal. She has no wheezes. She has no rhonchi. She has no rales. She exhibits no tenderness.   Abdominal: Soft. There is no tenderness.   Musculoskeletal: Normal range of motion. She exhibits no edema or tenderness.   Lymphadenopathy:     She has no cervical adenopathy.   Neurological: She is alert and oriented to person, place, and time. She has normal strength.   Skin: Skin is warm and dry. No rash noted. No cyanosis or erythema.   Psychiatric: She has a normal mood and affect. Her behavior is normal. Thought content normal.     Independent Review of Radiographic Studies:    CT OF THE CHEST WITH CONTRAST 09/01/2017      HISTORY: Follow-up diaphragmatic herniation.      TECHNIQUE: Spiral images were obtained from the lung apices to the upper  abdomen after intravenous contrast.      COMPARISON: Previous CT of the chest dated 04/18/2017 is compared.      FINDINGS:  Again seen is a small herniation of the right hemidiaphragm  with some herniation of the liver into the defect. The size of the  herniated liver is approximately 2.7 cm x approximately 2.4 cm and  appears slightly greater than on the 04/18/2017 study when it measured  approximately 2.5 cm x approximately 2.2 cm. There is some chronic  scarring in the right lower lobe stable since the previous study.      No pathologically enlarged hilar or mediastinal lymph nodes are seen.  Heart and great vessels appear unremarkable. No lung masses are seen.      IMPRESSION:  Small right diaphragmatic hernia containing some liver. This  may be minimally increased in size from the previous study of  04/18/2017.      Radiation dose reduction techniques were utilized, including automated  exposure control and exposure modulation based on body size.           This report was finalized on 9/6/2017 5:23 PM by Dr. Rik Dodson MD.  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  Recurrence of her diaphragmatic hernia.  Multiple medical comorbidities.      Plan:  There is slight enlargement of the diaphragmatic hernia however I do not believe that her symptoms that she is experiencing, most of them back related, are related to the recurrence of her diaphragmatic hernia.  There appears to be either scar or incarcerated pedicle of the liver present on the most recent CAT scan.  There is subtle growth partially 2 mm in either direction, currently measuring 2.7 x 2.4 cm.  She would require a repeat thoracotomy on the right with probable mesh into position or patch interval interposition to repair the defect in her diaphragm.  At this point she is not eager to proceed down that pathway and I can't say that I blame her when she is currently experiencing a fair amount of back related issues.  We can readdress this if it continues to enlarge.  I have ordered a CT scan for 6 months with follow-up in the office.  If she needs to see us sooner we could make the appropriate arrangements to do so.  After her next visit I'll update you on her condition and make further recommendations if necessary. Should you have any questions or concerns regarding your patient's care, please do not hesitate to contact me.  Sincerely, Darryl Benjamin M.D.  There are no diagnoses linked to this encounter.

## 2017-10-24 ENCOUNTER — TELEPHONE (OUTPATIENT)
Dept: PAIN MEDICINE | Facility: CLINIC | Age: 52
End: 2017-10-24

## 2017-10-24 NOTE — TELEPHONE ENCOUNTER
Patient left me a message on 10/23 at 3:02pm - stating that she has not heard about her spinal cord stimulator procedure, she is very upset that it has taken so long to get a response about the status. She stated that she already has an approval letter and should have already been scheduled.    Spoke to Lynnette about this - she states she has spoken to Ms. Ortega on multiple occassions regarding the approval. She rook down the approval number and called the insurance company, they did not recognize it as being a valid authorization, and could not locate it in the system. Lynnette then call the patient and asked her to bring the letter in, states patient tried to email it to her but she never received it. Patient was asked to fax it and said she did not have access to a fax machine. Lynnette told her to bring the letter by as soon as she could for scheduling.    I left a message for the patient today at 2:34pm to discuss her concerns, went straight to voicemail.

## 2017-11-20 DIAGNOSIS — Z01.818 PRE-OP EVALUATION: Primary | ICD-10-CM

## 2017-11-25 ENCOUNTER — RESULTS ENCOUNTER (OUTPATIENT)
Dept: PAIN MEDICINE | Facility: CLINIC | Age: 52
End: 2017-11-25

## 2017-11-25 DIAGNOSIS — Z01.818 PRE-OP EVALUATION: ICD-10-CM

## 2017-11-27 ENCOUNTER — APPOINTMENT (OUTPATIENT)
Dept: LAB | Facility: HOSPITAL | Age: 52
End: 2017-11-27

## 2017-11-27 ENCOUNTER — HOSPITAL ENCOUNTER (OUTPATIENT)
Dept: CARDIOLOGY | Facility: HOSPITAL | Age: 52
Discharge: HOME | End: 2017-11-27
Attending: ANESTHESIOLOGY | Admitting: ANESTHESIOLOGY

## 2017-11-27 ENCOUNTER — HOSPITAL ENCOUNTER (OUTPATIENT)
Dept: GENERAL RADIOLOGY | Facility: HOSPITAL | Age: 52
Discharge: HOME | End: 2017-11-27
Attending: ANESTHESIOLOGY

## 2017-11-27 ENCOUNTER — APPOINTMENT (OUTPATIENT)
Dept: GENERAL RADIOLOGY | Facility: HOSPITAL | Age: 52
End: 2017-11-27
Attending: ANESTHESIOLOGY

## 2017-11-27 ENCOUNTER — TRANSCRIBE ORDERS (OUTPATIENT)
Dept: ADMINISTRATIVE | Facility: HOSPITAL | Age: 52
End: 2017-11-27

## 2017-11-27 DIAGNOSIS — Z01.818 PRE-OP EVALUATION: ICD-10-CM

## 2017-11-27 DIAGNOSIS — Z01.818 PRE-OP EXAM: Primary | ICD-10-CM

## 2017-11-27 DIAGNOSIS — Z01.818 PRE-OP EXAM: ICD-10-CM

## 2017-11-27 LAB
ANION GAP SERPL CALCULATED.3IONS-SCNC: 10.5 MMOL/L
BASOPHILS # BLD AUTO: 0.04 10*3/MM3 (ref 0–0.2)
BASOPHILS NFR BLD AUTO: 1 % (ref 0–1.5)
BUN BLD-MCNC: 17 MG/DL (ref 6–20)
BUN/CREAT SERPL: 25.4 (ref 7–25)
CALCIUM SPEC-SCNC: 9.4 MG/DL (ref 8.6–10.5)
CHLORIDE SERPL-SCNC: 105 MMOL/L (ref 98–107)
CO2 SERPL-SCNC: 27.5 MMOL/L (ref 22–29)
CREAT BLD-MCNC: 0.67 MG/DL (ref 0.57–1)
DEPRECATED RDW RBC AUTO: 40 FL (ref 37–54)
EOSINOPHIL # BLD AUTO: 0.13 10*3/MM3 (ref 0–0.7)
EOSINOPHIL NFR BLD AUTO: 3.4 % (ref 0.3–6.2)
ERYTHROCYTE [DISTWIDTH] IN BLOOD BY AUTOMATED COUNT: 12.1 % (ref 11.7–13)
GFR SERPL CREATININE-BSD FRML MDRD: 92 ML/MIN/1.73
GLUCOSE BLD-MCNC: 78 MG/DL (ref 65–99)
HCT VFR BLD AUTO: 35.4 % (ref 35.6–45.5)
HGB BLD-MCNC: 11.7 G/DL (ref 11.9–15.5)
IMM GRANULOCYTES # BLD: 0 10*3/MM3 (ref 0–0.03)
IMM GRANULOCYTES NFR BLD: 0 % (ref 0–0.5)
LYMPHOCYTES # BLD AUTO: 1.14 10*3/MM3 (ref 0.9–4.8)
LYMPHOCYTES NFR BLD AUTO: 29.5 % (ref 19.6–45.3)
MCH RBC QN AUTO: 29.4 PG (ref 26.9–32)
MCHC RBC AUTO-ENTMCNC: 33.1 G/DL (ref 32.4–36.3)
MCV RBC AUTO: 88.9 FL (ref 80.5–98.2)
MONOCYTES # BLD AUTO: 0.26 10*3/MM3 (ref 0.2–1.2)
MONOCYTES NFR BLD AUTO: 6.7 % (ref 5–12)
NEUTROPHILS # BLD AUTO: 2.3 10*3/MM3 (ref 1.9–8.1)
NEUTROPHILS NFR BLD AUTO: 59.4 % (ref 42.7–76)
NRBC BLD MANUAL-RTO: 0 /100 WBC (ref 0–0)
PLATELET # BLD AUTO: 204 10*3/MM3 (ref 140–500)
PMV BLD AUTO: 8.4 FL (ref 6–12)
POTASSIUM BLD-SCNC: 3.8 MMOL/L (ref 3.5–5.2)
RBC # BLD AUTO: 3.98 10*6/MM3 (ref 3.9–5.2)
SODIUM BLD-SCNC: 143 MMOL/L (ref 136–145)
WBC NRBC COR # BLD: 3.87 10*3/MM3 (ref 4.5–10.7)

## 2017-11-27 PROCEDURE — 36415 COLL VENOUS BLD VENIPUNCTURE: CPT | Performed by: NURSE PRACTITIONER

## 2017-11-27 PROCEDURE — 85025 COMPLETE CBC W/AUTO DIFF WBC: CPT | Performed by: ANESTHESIOLOGY

## 2017-11-27 PROCEDURE — 93010 ELECTROCARDIOGRAM REPORT: CPT | Performed by: INTERNAL MEDICINE

## 2017-11-27 PROCEDURE — 80048 BASIC METABOLIC PNL TOTAL CA: CPT | Performed by: ANESTHESIOLOGY

## 2017-11-27 PROCEDURE — 93005 ELECTROCARDIOGRAM TRACING: CPT | Performed by: ANESTHESIOLOGY

## 2017-11-27 PROCEDURE — 71020 HC CHEST PA AND LATERAL: CPT

## 2017-11-30 ENCOUNTER — DOCUMENTATION (OUTPATIENT)
Dept: PAIN MEDICINE | Facility: CLINIC | Age: 52
End: 2017-11-30

## 2017-11-30 ENCOUNTER — OUTSIDE FACILITY SERVICE (OUTPATIENT)
Dept: PAIN MEDICINE | Facility: CLINIC | Age: 52
End: 2017-11-30

## 2017-11-30 PROCEDURE — 63650 IMPLANT NEUROELECTRODES: CPT | Performed by: ANESTHESIOLOGY

## 2017-11-30 NOTE — PROGRESS NOTES
Spinal Cord Stimulation - Phase 1 (SCS Trialing)  Two-Lead Technique    [facility]    Preop Dx: L-dd, TPLS  Postop Dx: Same as preop dx    SCS System: [employer]    Lead one...   Infineon 16 contact lead entering into the T8-9 interspace, advanced to a point with the distal tip at the top of T6 vertebral level, lying just right of midline      Lead two...   Infineon 16 contact lead entering into the T8-9 interspace, advanced to a point with the distal tip at the top of T6 vertebral level, lying just left of midline      Preprocedure Discussion with Patient:  Risks and complications were discussed with the patient prior to starting the procedure and informed consent was obtained.  We discussed various topics including but not limited to bleeding, infection, injury, allergic reactions, spinal cord and/or neural injury, implant site pain, post procedural site soreness, equipment malfunction including but not limited to lead fracture or lead migration or risk of electrical shock or risk of loss stimulation, risk of the lack of pain relief, and risk of worsening clinical picture.      Reason for procedure:  Lumbar degenerative disc disease not amenable to neurosurgical repair, bilateral radicular symptoms throughout legs, thoracolumbar pain after schwannoma extraction and T10-11 laminectomy.  This patient had a favorable psychological profile noted prior to trialing.         Sedation:  Monitored Anesthesia Care with Local Anesthetic  Antibiotics:   Cefazolin 2g IV immediately prior to incision    Description of Procedure:    After obtaining informed consent, IV access had been obtained by nursing staff in the preoperative area.  The patient was transported to the operative suite and was placed in a prone position.  Appropriate padding was placed under the patient's abdomen.  Anesthesia care and cardiopulmonary monitoring maintained by member of the anesthesiology team throughout the  procedure.  Perioperative antibodies were given prior to incision per routine as indicated in the anesthesia record and indicated above.  The patient's spine was cleansed appropriately with routine cleansing, and then prepped in a sterile routine fashion.  The area was then draped in sterile routine fashion.       The midline of the patient's spine was identified and marked.  The skin and subcutaneous tissue at the needle entry sites were anesthetized with appropriate amounts of local anesthetic solution.     Needle entry sites were about 1 1/2-2 vertebral levels below the intended interlaminar space for epidural access.  The entry points were medial to the pedicles, and inserted at acute angles of less than 45° and in to the interlaminar space noted above.  Loss-of-resistance technique was utilized to identify entry into the epidural space.  Aspiration was negative.  The leads were inserted in the epidural needles as noted above to the aforementioned target.  Lateral fluoroscopic view was utilized to confirm posterior placement in the epidural space of each lead.      Epidural needle was removed slowly from the skin at each insertion with care not to advance to retract needle tip position.  AP fluoroscopic imaging was checked sequentially to confirm no gross changes in lead position had occurred.      Each lead was secured to the skin using a StayFix dressing.  Once more in AP fluoroscopic view was checked to confirm final lead placement after the bandage placement was completed.  The sites were dressed with a Tegaderm dressing in my routine fashion.        Estimated Blood Loss: none  Specimens:   non  Complications:  No complications were noted. and of note, the placement of the trial leads was difficult.  Entry bilaterally T12-L1 was not successful as the T10-11 interspace could not be traversed.      Tolerance and discharge condition:    The patient tolerated the procedure well and was awakened from any sedation  without incident.  The patient was transported to the recovery area without difficulties.  Further device programming was performed by the spinal cord stimulation device representative in the recovery area.  The patient was again cautioned not to drive at this time and avoid strenuous activities and to avoid reaching overhead and to avoid bending and avoid lifting objects over 5 pounds.  The patient was discharged home under the care of family members in stable and satisfactory condition.      Plan of care:    - The patient was given our standard instruction sheet and will resume all medications as per the medication reconciliation sheet.      - The patient will return to my office at the appropriate time scheduled in about one business day with the stimulation device representative for further device programming and education about device function if necessary.     - The patient will again return to my office in about a week for the final visit of the trial in order to end the trial and decide upon whether or not to proceed to Phase 2.     The patient understands that there is any signs of complication, bleeding, infection, fever, chills, increasing back pain or spine pain, any neurologic deficit, or any other concerning finding, that the patient of a family member should call my office at [phone] at any time to access the answering service.    We discussed that if the trial is successful we will plan to refer to neurosurgeon for Paddle lead for permanent implant.     INITIAL STIMULATION SETTINGS:  See the representative's note from PACU as there was no intraop testing.

## 2017-12-02 DIAGNOSIS — K21.9 GASTROESOPHAGEAL REFLUX DISEASE, ESOPHAGITIS PRESENCE NOT SPECIFIED: ICD-10-CM

## 2017-12-04 RX ORDER — PANTOPRAZOLE SODIUM 40 MG/1
TABLET, DELAYED RELEASE ORAL
Qty: 90 TABLET | Refills: 3 | Status: SHIPPED | OUTPATIENT
Start: 2017-12-04 | End: 2017-12-22 | Stop reason: SDUPTHER

## 2017-12-06 ENCOUNTER — OFFICE VISIT (OUTPATIENT)
Dept: PAIN MEDICINE | Facility: CLINIC | Age: 52
End: 2017-12-06

## 2017-12-06 VITALS
BODY MASS INDEX: 42.91 KG/M2 | SYSTOLIC BLOOD PRESSURE: 113 MMHG | HEART RATE: 75 BPM | RESPIRATION RATE: 18 BRPM | TEMPERATURE: 98.4 F | OXYGEN SATURATION: 98 % | HEIGHT: 55 IN | WEIGHT: 185.4 LBS | DIASTOLIC BLOOD PRESSURE: 70 MMHG

## 2017-12-06 DIAGNOSIS — M96.1 POSTLAMINECTOMY SYNDROME, THORACIC: Primary | ICD-10-CM

## 2017-12-06 DIAGNOSIS — M51.36 DDD (DEGENERATIVE DISC DISEASE), LUMBAR: ICD-10-CM

## 2017-12-06 PROCEDURE — 99024 POSTOP FOLLOW-UP VISIT: CPT | Performed by: ANESTHESIOLOGY

## 2017-12-06 NOTE — PROGRESS NOTES
"CHIEF COMPLAINT  Follow-up SCS lead pull.    Subjective   Machelle Ortega is a 52 y.o. female  who presents to the office for follow-up.She has a history of lumbar degenerative disc disease and also thoracic postlaminectomy syndrome.    She presents for end of trial lead pull today.  Trial was a success !    Leads removed without incident.      History of Present Illness     PEG Assessment   What number best describes your pain on average in the past week?3  What number best describes how, during the past week, pain has interfered with your enjoyment of life?6  What number best describes how, during the past week, pain has interfered with your general activity?  3      The following portions of the patient's history were reviewed and updated as appropriate: allergies, current medications, past family history, past medical history, past social history, past surgical history and problem list.    Review of Systems   Constitutional: Positive for fatigue.   HENT: Negative for congestion.    Eyes: Negative for visual disturbance.   Respiratory: Negative for cough, shortness of breath and wheezing.    Cardiovascular: Negative.    Gastrointestinal: Positive for constipation. Negative for diarrhea.   Genitourinary: Negative for difficulty urinating.   Musculoskeletal: Positive for back pain.   Neurological: Positive for weakness (left leg). Negative for numbness.   Psychiatric/Behavioral: Positive for sleep disturbance. Negative for suicidal ideas. The patient is nervous/anxious.        Vitals:    12/06/17 1148   BP: 113/70   Pulse: 75   Resp: 18   Temp: 98.4 °F (36.9 °C)   SpO2: 98%   Weight: 84.1 kg (185 lb 6.4 oz)   Height: 64.5 cm (25.39\")   PainSc:   4   PainLoc: Back         Objective   Physical Exam  Leads were in place, removed percutaneously without incident.     Assessment/Plan   Machelle was seen today for back pain.    Diagnoses and all orders for this visit:    Postlaminectomy syndrome, thoracic    DDD (degenerative " disc disease), lumbar        --- Follow-up with neurosurgeon for consult regarding SCS Phase 2 implant    This patient presents today for in-office removal of spinal cord stimulation trialing leads. The dressings were removed and the leads were exposed. There was no indication of infection or irritation. The leads were easily pulled from the epidural space and removed. Lead tips were intact. No excess drainage or bleeding noted.     The patient had a very successful trial with consistent pain relief of at least 50-60% relief. The patient is pleased with the results and is requesting that we proceed to the permanent implantation of the leads along with the implantable pulse generator.      We discussed reasonable expectations about the permanent device performance, and I feel that the patient expresses reasonable expectations.We discussed risks, including but not limited to bleeding, infection, nerve or spinal cord injury, postprocedural pain, device malfunction, lead migration, risk of shock, and risk of loss of long term pain relief. The patient verbalized informed consent to proceed. We will seek authorization for SCS Phase 2, with the [employer] system.  The plan will be to place the permanent paddle electrodes in the same spinal region that were used during the trial and produced relief.                     EMR Dragon/Transcription disclaimer:   Much of this encounter note is an electronic transcription/translation of spoken language to printed text. The electronic translation of spoken language may permit erroneous, or at times, nonsensical words or phrases to be inadvertently transcribed; Although I have reviewed the note for such errors, some may still exist.

## 2017-12-08 ENCOUNTER — OFFICE VISIT (OUTPATIENT)
Dept: NEUROSURGERY | Facility: CLINIC | Age: 52
End: 2017-12-08

## 2017-12-08 ENCOUNTER — HOSPITAL ENCOUNTER (OUTPATIENT)
Facility: HOSPITAL | Age: 52
Setting detail: HOSPITAL OUTPATIENT SURGERY
End: 2017-12-08
Attending: NEUROLOGICAL SURGERY | Admitting: NEUROLOGICAL SURGERY

## 2017-12-08 VITALS
BODY MASS INDEX: 30.82 KG/M2 | HEART RATE: 64 BPM | HEIGHT: 65 IN | DIASTOLIC BLOOD PRESSURE: 65 MMHG | SYSTOLIC BLOOD PRESSURE: 103 MMHG | WEIGHT: 185 LBS

## 2017-12-08 DIAGNOSIS — M51.36 DDD (DEGENERATIVE DISC DISEASE), LUMBAR: Primary | ICD-10-CM

## 2017-12-08 DIAGNOSIS — Z98.890 HISTORY OF SPINAL SURGERY: ICD-10-CM

## 2017-12-08 DIAGNOSIS — Z86.018 HISTORY OF BENIGN SCHWANNOMA: ICD-10-CM

## 2017-12-08 PROCEDURE — 99213 OFFICE O/P EST LOW 20 MIN: CPT | Performed by: NEUROLOGICAL SURGERY

## 2017-12-08 NOTE — PROGRESS NOTES
Subjective   Patient ID: Machelle Ortega is a 52 y.o. female is here today for follow-up on back and bilateral leg pain after having a spinal cord stimulator trial.    Today the patient reports that she had significant relief from the stimulator and now that it's out she can tell a difference. She reports that the stimulator had on effect on her bladder and bowels.    Back Pain   This is a chronic problem. The current episode started more than 1 month ago. The problem occurs daily. The problem has been gradually improving since onset. The pain is present in the lumbar spine. The pain radiates to the left thigh, left knee, left foot, right thigh, right knee and right foot. Associated symptoms include bladder incontinence, bowel incontinence and leg pain.       The following portions of the patient's history were reviewed and updated as appropriate: allergies, current medications, past family history, past medical history, past social history, past surgical history and problem list.    Review of Systems   Gastrointestinal: Positive for bowel incontinence.   Genitourinary: Positive for bladder incontinence.   Musculoskeletal: Positive for back pain.   All other systems reviewed and are negative.    She is with her friend.  She had a successful trial with the [facility] percutaneous leads.  It appears that the leads where the best settings occurred were at T7-T8 in the midline.  She has symmetrically low back pain and bilateral leg pain.  No primary spinal surgery was recommended.  I think moving forward with a permanent surgical lead placed in the retrograde fashion to avoid scar tissue or possibly below depending upon how far superior the scar tissue went. It will be done at first followed by the staged placement of an IPG a day later.  Hospital site will be where the programming will take place.  The goals, risks, and benefits were discussed thoroughly.  Again, it could be either retrograde or antegrade.  I will  decide at the time of the surgery.  The lead will be placed at the T7-T8 interspace in the midline. She knows that she cannot have a MRI afterwards, but we can follow her for her tumor other ways.       Objective   Physical Exam   Constitutional: She is oriented to person, place, and time. She appears well-developed and well-nourished.   HENT:   Head: Normocephalic and atraumatic.   Eyes: Conjunctivae and EOM are normal. Pupils are equal, round, and reactive to light.   Fundoscopic exam:       The right eye shows no papilledema. The right eye shows venous pulsations.        The left eye shows no papilledema. The left eye shows venous pulsations.   Neck: Carotid bruit is not present.   Neurological: She is oriented to person, place, and time. She has a normal Finger-Nose-Finger Test and a normal Heel to Shin Test. Gait normal.   Reflex Scores:       Tricep reflexes are 2+ on the right side and 2+ on the left side.       Bicep reflexes are 2+ on the right side and 2+ on the left side.       Brachioradialis reflexes are 2+ on the right side and 2+ on the left side.       Patellar reflexes are 2+ on the right side and 2+ on the left side.       Achilles reflexes are 2+ on the right side and 2+ on the left side.  Psychiatric: Her speech is normal.     Neurologic Exam     Mental Status   Oriented to person, place, and time.   Registration of memory: Good recent and remote memory.   Attention: normal. Concentration: normal.   Speech: speech is normal   Level of consciousness: alert  Knowledge: consistent with education.     Cranial Nerves     CN II   Visual fields full to confrontation.   Visual acuity: normal    CN III, IV, VI   Pupils are equal, round, and reactive to light.  Extraocular motions are normal.     CN V   Facial sensation intact.   Right corneal reflex: normal  Left corneal reflex: normal    CN VII   Facial expression full, symmetric.   Right facial weakness: none  Left facial weakness: none    CN VIII    Hearing: intact    CN IX, X   Palate: symmetric    CN XI   Right sternocleidomastoid strength: normal  Left sternocleidomastoid strength: normal    CN XII   Tongue: not atrophic  Tongue deviation: none    Motor Exam   Muscle bulk: normal  Right arm tone: normal  Left arm tone: normal  Right leg tone: normal  Left leg tone: normal    Strength   Strength 5/5 except as noted.     Sensory Exam   Light touch normal.     Gait, Coordination, and Reflexes     Gait  Gait: normal    Coordination   Finger to nose coordination: normal  Heel to shin coordination: normal    Reflexes   Right brachioradialis: 2+  Left brachioradialis: 2+  Right biceps: 2+  Left biceps: 2+  Right triceps: 2+  Left triceps: 2+  Right patellar: 2+  Left patellar: 2+  Right achilles: 2+  Left achilles: 2+  Right : 2+  Left : 2+      Assessment/Plan   Independent Review of Radiographic Studies:    I reviewed the thoracic MRI done 06/01/2017 which showed no evidence of any tumor.  The lumbar MRI did not show any severe lumbar stenosis either. Agree with the report.      Medical Decision Making:    Will move forward with a T6 laminotomy for retrograde placement of the [employer] surgical lead at the T7-T8 level, followed by a day of programming and placement of the internal pulse generator in the right gluteal region the next day.  This will all be done under a general anesthetic.  Will do intrahospital programming.  The goal of surgery is placing a functional spinal cord stimulator system using a surgical lead to decrease pain and improve her quality of life.  The risks include, but are not limited to, infection, possibly requiring explantation, hemorrhage requiring transfusion and reoperation, CSF leak requiring reoperation, incomplete relief of symptoms, potential need for additional surgeries in the future, stroke, paralysis, coma, and death.  She agrees to proceed.  We will do this in 2 stages in late December.       Machelle was seen  today for back pain.    Diagnoses and all orders for this visit:    DDD (degenerative disc disease), lumbar  -     Case request; Standing  -     Case request  -     Case request; Standing  -     Case request    History of benign schwannoma  -     Case request; Standing  -     Case request  -     Case request; Standing  -     Case request    History of spinal surgery  -     Case request; Standing  -     Case request  -     Case request; Standing  -     Case request    Return for 2 weeks with S or L.

## 2017-12-11 ENCOUNTER — TRANSCRIBE ORDERS (OUTPATIENT)
Dept: NEUROSURGERY | Facility: CLINIC | Age: 52
End: 2017-12-11

## 2017-12-21 ENCOUNTER — APPOINTMENT (OUTPATIENT)
Dept: PREADMISSION TESTING | Facility: HOSPITAL | Age: 52
End: 2017-12-21

## 2017-12-22 ENCOUNTER — APPOINTMENT (OUTPATIENT)
Dept: PREADMISSION TESTING | Facility: HOSPITAL | Age: 52
End: 2017-12-22

## 2017-12-22 VITALS
DIASTOLIC BLOOD PRESSURE: 62 MMHG | WEIGHT: 183 LBS | HEART RATE: 66 BPM | BODY MASS INDEX: 31.24 KG/M2 | OXYGEN SATURATION: 100 % | HEIGHT: 64 IN | TEMPERATURE: 97.1 F | SYSTOLIC BLOOD PRESSURE: 111 MMHG

## 2017-12-22 LAB
ANION GAP SERPL CALCULATED.3IONS-SCNC: 8.9 MMOL/L
APTT PPP: 27.8 SECONDS (ref 22.7–35.4)
BACTERIA UR QL AUTO: ABNORMAL /HPF
BASOPHILS # BLD AUTO: 0.02 10*3/MM3 (ref 0–0.2)
BASOPHILS NFR BLD AUTO: 0.5 % (ref 0–1.5)
BILIRUB UR QL STRIP: NEGATIVE
BUN BLD-MCNC: 13 MG/DL (ref 6–20)
BUN/CREAT SERPL: 20.3 (ref 7–25)
CALCIUM SPEC-SCNC: 9.1 MG/DL (ref 8.6–10.5)
CHLORIDE SERPL-SCNC: 104 MMOL/L (ref 98–107)
CLARITY UR: CLEAR
CO2 SERPL-SCNC: 28.1 MMOL/L (ref 22–29)
COLOR UR: YELLOW
CREAT BLD-MCNC: 0.64 MG/DL (ref 0.57–1)
DEPRECATED RDW RBC AUTO: 43 FL (ref 37–54)
EOSINOPHIL # BLD AUTO: 0.1 10*3/MM3 (ref 0–0.7)
EOSINOPHIL NFR BLD AUTO: 2.4 % (ref 0.3–6.2)
ERYTHROCYTE [DISTWIDTH] IN BLOOD BY AUTOMATED COUNT: 12.7 % (ref 11.7–13)
GFR SERPL CREATININE-BSD FRML MDRD: 97 ML/MIN/1.73
GLUCOSE BLD-MCNC: 94 MG/DL (ref 65–99)
GLUCOSE UR STRIP-MCNC: NEGATIVE MG/DL
HCT VFR BLD AUTO: 35.5 % (ref 35.6–45.5)
HGB BLD-MCNC: 11.2 G/DL (ref 11.9–15.5)
HGB UR QL STRIP.AUTO: NEGATIVE
HYALINE CASTS UR QL AUTO: ABNORMAL /LPF
IMM GRANULOCYTES # BLD: 0 10*3/MM3 (ref 0–0.03)
IMM GRANULOCYTES NFR BLD: 0 % (ref 0–0.5)
INR PPP: 1.08 (ref 0.9–1.1)
KETONES UR QL STRIP: NEGATIVE
LEUKOCYTE ESTERASE UR QL STRIP.AUTO: NEGATIVE
LYMPHOCYTES # BLD AUTO: 1.18 10*3/MM3 (ref 0.9–4.8)
LYMPHOCYTES NFR BLD AUTO: 28.2 % (ref 19.6–45.3)
MCH RBC QN AUTO: 29.1 PG (ref 26.9–32)
MCHC RBC AUTO-ENTMCNC: 31.5 G/DL (ref 32.4–36.3)
MCV RBC AUTO: 92.2 FL (ref 80.5–98.2)
MONOCYTES # BLD AUTO: 0.23 10*3/MM3 (ref 0.2–1.2)
MONOCYTES NFR BLD AUTO: 5.5 % (ref 5–12)
NEUTROPHILS # BLD AUTO: 2.66 10*3/MM3 (ref 1.9–8.1)
NEUTROPHILS NFR BLD AUTO: 63.4 % (ref 42.7–76)
NITRITE UR QL STRIP: NEGATIVE
PH UR STRIP.AUTO: 6 [PH] (ref 5–8)
PLATELET # BLD AUTO: 207 10*3/MM3 (ref 140–500)
PMV BLD AUTO: 8.8 FL (ref 6–12)
POTASSIUM BLD-SCNC: 3.9 MMOL/L (ref 3.5–5.2)
PROT UR QL STRIP: NEGATIVE
PROTHROMBIN TIME: 13.6 SECONDS (ref 11.7–14.2)
RBC # BLD AUTO: 3.85 10*6/MM3 (ref 3.9–5.2)
RBC # UR: ABNORMAL /HPF
REF LAB TEST METHOD: ABNORMAL
SODIUM BLD-SCNC: 141 MMOL/L (ref 136–145)
SP GR UR STRIP: >=1.03 (ref 1–1.03)
SQUAMOUS #/AREA URNS HPF: ABNORMAL /HPF
UROBILINOGEN UR QL STRIP: NORMAL
WBC NRBC COR # BLD: 4.19 10*3/MM3 (ref 4.5–10.7)
WBC UR QL AUTO: ABNORMAL /HPF

## 2017-12-22 PROCEDURE — 81001 URINALYSIS AUTO W/SCOPE: CPT | Performed by: NEUROLOGICAL SURGERY

## 2017-12-22 PROCEDURE — 85025 COMPLETE CBC W/AUTO DIFF WBC: CPT | Performed by: NEUROLOGICAL SURGERY

## 2017-12-22 PROCEDURE — 85730 THROMBOPLASTIN TIME PARTIAL: CPT | Performed by: NEUROLOGICAL SURGERY

## 2017-12-22 PROCEDURE — 85610 PROTHROMBIN TIME: CPT | Performed by: NEUROLOGICAL SURGERY

## 2017-12-22 PROCEDURE — 80048 BASIC METABOLIC PNL TOTAL CA: CPT | Performed by: NEUROLOGICAL SURGERY

## 2017-12-22 PROCEDURE — 36415 COLL VENOUS BLD VENIPUNCTURE: CPT

## 2017-12-22 RX ORDER — TRAZODONE HYDROCHLORIDE 100 MG/1
200 TABLET ORAL NIGHTLY
COMMUNITY
End: 2018-10-03 | Stop reason: SDUPTHER

## 2017-12-22 RX ORDER — PANTOPRAZOLE SODIUM 40 MG/1
40 TABLET, DELAYED RELEASE ORAL EVERY EVENING
COMMUNITY
End: 2018-10-03 | Stop reason: SDUPTHER

## 2017-12-22 RX ORDER — CYCLOBENZAPRINE HCL 10 MG
10 TABLET ORAL 2 TIMES DAILY PRN
COMMUNITY
End: 2018-10-03 | Stop reason: SDUPTHER

## 2017-12-22 NOTE — DISCHARGE INSTRUCTIONS
Take the following medications the morning of surgery with a small sip of water:  NONE      General Instructions:  • Do not eat solid food after midnight the night before surgery.  • You may drink clear liquids day of surgery but must stop at least one hour before your hospital arrival time.  • It is beneficial for you to have a clear drink that contains carbohydrates the day of surgery.  We suggest a 12 to 20 ounce bottle of Gatorade or Powerade for non-diabetic patients or a 12 to 20 ounce bottle of G2 or Powerade Zero for diabetic patients. (Pediatric patients, are not advised to drink a 12 to 20 ounce carbohydrate drink)    Clear liquids are liquids you can see through.  Nothing red in color.     Plain water                               Sports drinks  Sodas                                   Gelatin (Jell-O)  Fruit juices without pulp such as white grape juice and apple juice  Popsicles that contain no fruit or yogurt  Tea or coffee (no cream or milk added)  Gatorade / Powerade  G2 / Powerade Zero    • Infants may have breast milk up to four hours before surgery.  • Infants drinking formula may drink formula up to six hours before surgery.   • Patients who avoid smoking, chewing tobacco and alcohol for 4 weeks prior to surgery have a reduced risk of post-operative complications.  Quit smoking as many days before surgery as you can.  • Do not smoke, use chewing tobacco or drink alcohol the day of surgery.   • If applicable bring your C-PAP/ BI-PAP machine.  • Bring any papers given to you in the doctor’s office.  • Wear clean comfortable clothes and socks.  • Do not wear contact lenses or make-up.  Bring a case for your glasses.   • Bring crutches or walker if applicable.  • Remove all piercings.  Leave jewelry and any other valuables at home.  • The Pre-Admission Testing nurse will instruct you to bring medications if unable to obtain an accurate list in Pre-Admission Testing.        If you were given a blood  bank ID arm band remember to bring it with you the day of surgery.    Preventing a Surgical Site Infection:  • For 2 to 3 days before surgery, avoid shaving with a razor because the razor can irritate skin and make it easier to develop an infection.  • The night prior to surgery sleep in a clean bed with clean clothing.  Do not allow pets to sleep with you.  • Shower on the morning of surgery using a fresh bar of anti-bacterial soap (such as Dial) and clean washcloth.  Dry with a clean towel and dress in clean clothing.  • Ask your surgeon if you will be receiving antibiotics prior to surgery.  • Make sure you, your family, and all healthcare providers clean their hands with soap and water or an alcohol based hand  before caring for you or your wound.    Day of surgery: 12/28/2017 ARRIVAL TIME 1:00 PM  Upon arrival, a Pre-op nurse and Anesthesiologist will review your health history, obtain vital signs, and answer questions you may have.  The only belongings needed at this time will be your home medications and if applicable your C-PAP/BI-PAP machine.  If you are staying overnight your family can leave the rest of your belongings in the car and bring them to your room later.  A Pre-op nurse will start an IV and you may receive medication in preparation for surgery, including something to help you relax.  Your family will be able to see you in the Pre-op area.  While you are in surgery your family should notify the waiting room  if they leave the waiting room area and provide a contact phone number.    Please be aware that surgery does come with discomfort.  We want to make every effort to control your discomfort so please discuss any uncontrolled symptoms with your nurse.   Your doctor will most likely have prescribed pain medications.      If you are going home after surgery you will receive individualized written care instructions before being discharged.  A responsible adult must drive you to  and from the hospital on the day of your surgery and stay with you for 24 hours.    If you are staying overnight following surgery, you will be transported to your hospital room following the recovery period.  [facility] has all private rooms.    If you have any questions please call Pre-Admission Testing at [phone].  Deductibles and co-payments are collected on the day of service. Please be prepared to pay the required co-pay, deductible or deposit on the day of service as defined by your plan.

## 2017-12-28 ENCOUNTER — APPOINTMENT (OUTPATIENT)
Dept: GENERAL RADIOLOGY | Facility: HOSPITAL | Age: 52
End: 2017-12-28

## 2017-12-28 ENCOUNTER — ANESTHESIA (OUTPATIENT)
Dept: PERIOP | Facility: HOSPITAL | Age: 52
End: 2017-12-28

## 2017-12-28 ENCOUNTER — HOSPITAL ENCOUNTER (OUTPATIENT)
Facility: HOSPITAL | Age: 52
Discharge: HOME | End: 2017-12-30
Attending: NEUROLOGICAL SURGERY | Admitting: NEUROLOGICAL SURGERY

## 2017-12-28 ENCOUNTER — ANESTHESIA EVENT (OUTPATIENT)
Dept: PERIOP | Facility: HOSPITAL | Age: 52
End: 2017-12-28

## 2017-12-28 PROBLEM — M79.2 NEUROPATHIC PAIN: Status: ACTIVE | Noted: 2017-12-28

## 2017-12-28 PROCEDURE — 25010000002 NEOSTIGMINE PER 0.5 MG: Performed by: ANESTHESIOLOGY

## 2017-12-28 PROCEDURE — 63655 IMPLANT NEUROELECTRODES: CPT | Performed by: NEUROLOGICAL SURGERY

## 2017-12-28 PROCEDURE — 25010000002 FENTANYL CITRATE (PF) 100 MCG/2ML SOLUTION: Performed by: ANESTHESIOLOGY

## 2017-12-28 PROCEDURE — L9900 O&P SUPPLY/ACCESSORY/SERVICE: HCPCS | Performed by: NEUROLOGICAL SURGERY

## 2017-12-28 PROCEDURE — 25010000002 METHOCARBAMOL 1000 MG/10ML SOLUTION 10 ML VIAL: Performed by: NEUROLOGICAL SURGERY

## 2017-12-28 PROCEDURE — 25010000003 CEFAZOLIN IN DEXTROSE 2-4 GM/100ML-% SOLUTION: Performed by: NEUROLOGICAL SURGERY

## 2017-12-28 PROCEDURE — 25010000002 PROPOFOL 10 MG/ML EMULSION: Performed by: ANESTHESIOLOGY

## 2017-12-28 PROCEDURE — 76000 FLUOROSCOPY <1 HR PHYS/QHP: CPT

## 2017-12-28 PROCEDURE — C1883 ADAPT/EXT, PACING/NEURO LEAD: HCPCS | Performed by: NEUROLOGICAL SURGERY

## 2017-12-28 PROCEDURE — 72020 X-RAY EXAM OF SPINE 1 VIEW: CPT

## 2017-12-28 PROCEDURE — 25010000002 HYDROMORPHONE PER 4 MG: Performed by: ANESTHESIOLOGY

## 2017-12-28 PROCEDURE — C1778 LEAD, NEUROSTIMULATOR: HCPCS | Performed by: NEUROLOGICAL SURGERY

## 2017-12-28 PROCEDURE — 25010000002 PROMETHAZINE PER 50 MG: Performed by: ANESTHESIOLOGY

## 2017-12-28 PROCEDURE — 25010000002 ONDANSETRON PER 1 MG: Performed by: NEUROLOGICAL SURGERY

## 2017-12-28 PROCEDURE — 25010000002 MIDAZOLAM PER 1 MG: Performed by: ANESTHESIOLOGY

## 2017-12-28 PROCEDURE — 25010000002 ONDANSETRON PER 1 MG: Performed by: ANESTHESIOLOGY

## 2017-12-28 DEVICE — 35CM 8 CONTACT EXTENSION KIT: Type: IMPLANTABLE DEVICE | Site: SPINE THORACIC | Status: FUNCTIONAL

## 2017-12-28 DEVICE — KT LD STIM SURG COVEREDGE 4X8 32X50CM: Type: IMPLANTABLE DEVICE | Site: SPINE THORACIC | Status: FUNCTIONAL

## 2017-12-28 DEVICE — 25CM 8 CONTACT EXTENSION KIT: Type: IMPLANTABLE DEVICE | Site: SPINE THORACIC | Status: FUNCTIONAL

## 2017-12-28 RX ORDER — FAMOTIDINE 10 MG/ML
20 INJECTION, SOLUTION INTRAVENOUS ONCE
Status: COMPLETED | OUTPATIENT
Start: 2017-12-28 | End: 2017-12-28

## 2017-12-28 RX ORDER — BUPROPION HYDROCHLORIDE 300 MG/1
300 TABLET ORAL EVERY EVENING
Status: DISCONTINUED | OUTPATIENT
Start: 2017-12-28 | End: 2017-12-30 | Stop reason: HOSPADM

## 2017-12-28 RX ORDER — MAGNESIUM HYDROXIDE 1200 MG/15ML
LIQUID ORAL AS NEEDED
Status: DISCONTINUED | OUTPATIENT
Start: 2017-12-28 | End: 2017-12-28 | Stop reason: HOSPADM

## 2017-12-28 RX ORDER — SODIUM CHLORIDE 0.9 % (FLUSH) 0.9 %
1-10 SYRINGE (ML) INJECTION AS NEEDED
Status: DISCONTINUED | OUTPATIENT
Start: 2017-12-28 | End: 2017-12-30 | Stop reason: HOSPADM

## 2017-12-28 RX ORDER — FLUOXETINE HYDROCHLORIDE 20 MG/1
60 CAPSULE ORAL NIGHTLY
Status: DISCONTINUED | OUTPATIENT
Start: 2017-12-28 | End: 2017-12-30 | Stop reason: HOSPADM

## 2017-12-28 RX ORDER — HYDRALAZINE HYDROCHLORIDE 20 MG/ML
5 INJECTION INTRAMUSCULAR; INTRAVENOUS
Status: DISCONTINUED | OUTPATIENT
Start: 2017-12-28 | End: 2017-12-28 | Stop reason: HOSPADM

## 2017-12-28 RX ORDER — PROMETHAZINE HYDROCHLORIDE 25 MG/ML
12.5 INJECTION, SOLUTION INTRAMUSCULAR; INTRAVENOUS ONCE AS NEEDED
Status: COMPLETED | OUTPATIENT
Start: 2017-12-28 | End: 2017-12-28

## 2017-12-28 RX ORDER — ONDANSETRON 2 MG/ML
4 INJECTION INTRAMUSCULAR; INTRAVENOUS EVERY 6 HOURS PRN
Status: DISCONTINUED | OUTPATIENT
Start: 2017-12-28 | End: 2017-12-30 | Stop reason: HOSPADM

## 2017-12-28 RX ORDER — PROMETHAZINE HYDROCHLORIDE 25 MG/1
25 SUPPOSITORY RECTAL ONCE AS NEEDED
Status: COMPLETED | OUTPATIENT
Start: 2017-12-28 | End: 2017-12-28

## 2017-12-28 RX ORDER — CEFAZOLIN SODIUM 2 G/100ML
2 INJECTION, SOLUTION INTRAVENOUS ONCE
Status: COMPLETED | OUTPATIENT
Start: 2017-12-28 | End: 2017-12-28

## 2017-12-28 RX ORDER — MIDAZOLAM HYDROCHLORIDE 1 MG/ML
2 INJECTION INTRAMUSCULAR; INTRAVENOUS
Status: DISCONTINUED | OUTPATIENT
Start: 2017-12-28 | End: 2017-12-28 | Stop reason: HOSPADM

## 2017-12-28 RX ORDER — HYDROCODONE BITARTRATE AND ACETAMINOPHEN 7.5; 325 MG/1; MG/1
1 TABLET ORAL EVERY 4 HOURS PRN
Status: DISCONTINUED | OUTPATIENT
Start: 2017-12-28 | End: 2017-12-30 | Stop reason: HOSPADM

## 2017-12-28 RX ORDER — CYCLOBENZAPRINE HCL 10 MG
10 TABLET ORAL 3 TIMES DAILY PRN
Status: DISCONTINUED | OUTPATIENT
Start: 2017-12-28 | End: 2017-12-30 | Stop reason: HOSPADM

## 2017-12-28 RX ORDER — FENTANYL CITRATE 50 UG/ML
50 INJECTION, SOLUTION INTRAMUSCULAR; INTRAVENOUS
Status: DISCONTINUED | OUTPATIENT
Start: 2017-12-28 | End: 2017-12-28 | Stop reason: HOSPADM

## 2017-12-28 RX ORDER — LIDOCAINE HYDROCHLORIDE 20 MG/ML
INJECTION, SOLUTION INFILTRATION; PERINEURAL AS NEEDED
Status: DISCONTINUED | OUTPATIENT
Start: 2017-12-28 | End: 2017-12-28 | Stop reason: SURG

## 2017-12-28 RX ORDER — PROMETHAZINE HYDROCHLORIDE 25 MG/1
12.5 TABLET ORAL ONCE AS NEEDED
Status: DISCONTINUED | OUTPATIENT
Start: 2017-12-28 | End: 2017-12-28 | Stop reason: HOSPADM

## 2017-12-28 RX ORDER — PANTOPRAZOLE SODIUM 40 MG/1
40 TABLET, DELAYED RELEASE ORAL EVERY EVENING
Status: DISCONTINUED | OUTPATIENT
Start: 2017-12-28 | End: 2017-12-30 | Stop reason: HOSPADM

## 2017-12-28 RX ORDER — ONDANSETRON 2 MG/ML
INJECTION INTRAMUSCULAR; INTRAVENOUS AS NEEDED
Status: DISCONTINUED | OUTPATIENT
Start: 2017-12-28 | End: 2017-12-28 | Stop reason: SURG

## 2017-12-28 RX ORDER — FENTANYL CITRATE 50 UG/ML
INJECTION, SOLUTION INTRAMUSCULAR; INTRAVENOUS AS NEEDED
Status: DISCONTINUED | OUTPATIENT
Start: 2017-12-28 | End: 2017-12-28 | Stop reason: SURG

## 2017-12-28 RX ORDER — DIPHENHYDRAMINE HYDROCHLORIDE 50 MG/ML
12.5 INJECTION INTRAMUSCULAR; INTRAVENOUS
Status: DISCONTINUED | OUTPATIENT
Start: 2017-12-28 | End: 2017-12-28 | Stop reason: HOSPADM

## 2017-12-28 RX ORDER — ONDANSETRON 2 MG/ML
4 INJECTION INTRAMUSCULAR; INTRAVENOUS ONCE AS NEEDED
Status: DISCONTINUED | OUTPATIENT
Start: 2017-12-28 | End: 2017-12-28 | Stop reason: HOSPADM

## 2017-12-28 RX ORDER — ACETAMINOPHEN 325 MG/1
650 TABLET ORAL EVERY 4 HOURS PRN
Status: DISCONTINUED | OUTPATIENT
Start: 2017-12-28 | End: 2017-12-30 | Stop reason: HOSPADM

## 2017-12-28 RX ORDER — DOCUSATE SODIUM 100 MG/1
100 CAPSULE, LIQUID FILLED ORAL 2 TIMES DAILY PRN
Status: DISCONTINUED | OUTPATIENT
Start: 2017-12-28 | End: 2017-12-30 | Stop reason: HOSPADM

## 2017-12-28 RX ORDER — MIDAZOLAM HYDROCHLORIDE 1 MG/ML
1 INJECTION INTRAMUSCULAR; INTRAVENOUS
Status: DISCONTINUED | OUTPATIENT
Start: 2017-12-28 | End: 2017-12-28 | Stop reason: HOSPADM

## 2017-12-28 RX ORDER — TRAZODONE HYDROCHLORIDE 100 MG/1
200 TABLET ORAL NIGHTLY
Status: DISCONTINUED | OUTPATIENT
Start: 2017-12-28 | End: 2017-12-30 | Stop reason: HOSPADM

## 2017-12-28 RX ORDER — PROPOFOL 10 MG/ML
VIAL (ML) INTRAVENOUS AS NEEDED
Status: DISCONTINUED | OUTPATIENT
Start: 2017-12-28 | End: 2017-12-28 | Stop reason: SURG

## 2017-12-28 RX ORDER — CEFAZOLIN SODIUM 2 G/100ML
2 INJECTION, SOLUTION INTRAVENOUS EVERY 8 HOURS
Status: COMPLETED | OUTPATIENT
Start: 2017-12-28 | End: 2017-12-29

## 2017-12-28 RX ORDER — HYDROMORPHONE HYDROCHLORIDE 1 MG/ML
0.5 INJECTION, SOLUTION INTRAMUSCULAR; INTRAVENOUS; SUBCUTANEOUS
Status: DISCONTINUED | OUTPATIENT
Start: 2017-12-28 | End: 2017-12-28 | Stop reason: HOSPADM

## 2017-12-28 RX ORDER — GABAPENTIN 400 MG/1
400 CAPSULE ORAL 3 TIMES DAILY
Status: DISCONTINUED | OUTPATIENT
Start: 2017-12-28 | End: 2017-12-30 | Stop reason: HOSPADM

## 2017-12-28 RX ORDER — ROCURONIUM BROMIDE 10 MG/ML
INJECTION, SOLUTION INTRAVENOUS AS NEEDED
Status: DISCONTINUED | OUTPATIENT
Start: 2017-12-28 | End: 2017-12-28 | Stop reason: SURG

## 2017-12-28 RX ORDER — EPHEDRINE SULFATE 50 MG/ML
INJECTION, SOLUTION INTRAVENOUS AS NEEDED
Status: DISCONTINUED | OUTPATIENT
Start: 2017-12-28 | End: 2017-12-28 | Stop reason: SURG

## 2017-12-28 RX ORDER — HYDROMORPHONE HCL IN 0.9% NACL 10 MG/50ML
PATIENT CONTROLLED ANALGESIA SYRINGE INTRAVENOUS CONTINUOUS
Status: DISCONTINUED | OUTPATIENT
Start: 2017-12-28 | End: 2017-12-30 | Stop reason: HOSPADM

## 2017-12-28 RX ORDER — LAMOTRIGINE 100 MG/1
200 TABLET ORAL EVERY EVENING
Status: DISCONTINUED | OUTPATIENT
Start: 2017-12-28 | End: 2017-12-30 | Stop reason: HOSPADM

## 2017-12-28 RX ORDER — LABETALOL HYDROCHLORIDE 5 MG/ML
5 INJECTION, SOLUTION INTRAVENOUS
Status: DISCONTINUED | OUTPATIENT
Start: 2017-12-28 | End: 2017-12-28 | Stop reason: HOSPADM

## 2017-12-28 RX ORDER — DEXTROSE, SODIUM CHLORIDE, AND POTASSIUM CHLORIDE 5; .45; .15 G/100ML; G/100ML; G/100ML
100 INJECTION INTRAVENOUS CONTINUOUS
Status: DISCONTINUED | OUTPATIENT
Start: 2017-12-28 | End: 2017-12-30 | Stop reason: HOSPADM

## 2017-12-28 RX ORDER — NALOXONE HCL 0.4 MG/ML
0.1 VIAL (ML) INJECTION
Status: DISCONTINUED | OUTPATIENT
Start: 2017-12-28 | End: 2017-12-28 | Stop reason: HOSPADM

## 2017-12-28 RX ORDER — FLUMAZENIL 0.1 MG/ML
0.2 INJECTION INTRAVENOUS AS NEEDED
Status: DISCONTINUED | OUTPATIENT
Start: 2017-12-28 | End: 2017-12-28 | Stop reason: HOSPADM

## 2017-12-28 RX ORDER — SODIUM CHLORIDE, SODIUM LACTATE, POTASSIUM CHLORIDE, CALCIUM CHLORIDE 600; 310; 30; 20 MG/100ML; MG/100ML; MG/100ML; MG/100ML
9 INJECTION, SOLUTION INTRAVENOUS CONTINUOUS
Status: DISCONTINUED | OUTPATIENT
Start: 2017-12-28 | End: 2017-12-28

## 2017-12-28 RX ORDER — BUPIVACAINE HYDROCHLORIDE AND EPINEPHRINE 2.5; 5 MG/ML; UG/ML
INJECTION, SOLUTION INFILTRATION; PERINEURAL AS NEEDED
Status: DISCONTINUED | OUTPATIENT
Start: 2017-12-28 | End: 2017-12-28 | Stop reason: HOSPADM

## 2017-12-28 RX ORDER — NALOXONE HCL 0.4 MG/ML
0.2 VIAL (ML) INJECTION AS NEEDED
Status: DISCONTINUED | OUTPATIENT
Start: 2017-12-28 | End: 2017-12-28 | Stop reason: HOSPADM

## 2017-12-28 RX ORDER — PROMETHAZINE HYDROCHLORIDE 25 MG/1
25 TABLET ORAL ONCE AS NEEDED
Status: COMPLETED | OUTPATIENT
Start: 2017-12-28 | End: 2017-12-28

## 2017-12-28 RX ORDER — GLYCOPYRROLATE 0.2 MG/ML
INJECTION INTRAMUSCULAR; INTRAVENOUS AS NEEDED
Status: DISCONTINUED | OUTPATIENT
Start: 2017-12-28 | End: 2017-12-28 | Stop reason: SURG

## 2017-12-28 RX ORDER — SENNA AND DOCUSATE SODIUM 50; 8.6 MG/1; MG/1
1 TABLET, FILM COATED ORAL NIGHTLY PRN
Status: DISCONTINUED | OUTPATIENT
Start: 2017-12-28 | End: 2017-12-30 | Stop reason: HOSPADM

## 2017-12-28 RX ORDER — EPHEDRINE SULFATE 50 MG/ML
5 INJECTION, SOLUTION INTRAVENOUS ONCE AS NEEDED
Status: DISCONTINUED | OUTPATIENT
Start: 2017-12-28 | End: 2017-12-28 | Stop reason: HOSPADM

## 2017-12-28 RX ORDER — SODIUM CHLORIDE 0.9 % (FLUSH) 0.9 %
1-10 SYRINGE (ML) INJECTION AS NEEDED
Status: DISCONTINUED | OUTPATIENT
Start: 2017-12-28 | End: 2017-12-28 | Stop reason: HOSPADM

## 2017-12-28 RX ADMIN — HYDROMORPHONE HYDROCHLORIDE 0.5 MG: 1 INJECTION, SOLUTION INTRAMUSCULAR; INTRAVENOUS; SUBCUTANEOUS at 17:42

## 2017-12-28 RX ADMIN — PANTOPRAZOLE SODIUM 40 MG: 40 TABLET, DELAYED RELEASE ORAL at 20:36

## 2017-12-28 RX ADMIN — SODIUM CHLORIDE, POTASSIUM CHLORIDE, SODIUM LACTATE AND CALCIUM CHLORIDE 9 ML/HR: 600; 310; 30; 20 INJECTION, SOLUTION INTRAVENOUS at 14:32

## 2017-12-28 RX ADMIN — NEOSTIGMINE METHYLSULFATE 3 MG: 1 INJECTION INTRAMUSCULAR; INTRAVENOUS; SUBCUTANEOUS at 17:13

## 2017-12-28 RX ADMIN — EPHEDRINE SULFATE 10 MG: 50 INJECTION INTRAMUSCULAR; INTRAVENOUS; SUBCUTANEOUS at 16:12

## 2017-12-28 RX ADMIN — METHOCARBAMOL 1000 MG: 100 INJECTION, SOLUTION INTRAMUSCULAR; INTRAVENOUS at 17:50

## 2017-12-28 RX ADMIN — SODIUM CHLORIDE, POTASSIUM CHLORIDE, SODIUM LACTATE AND CALCIUM CHLORIDE: 600; 310; 30; 20 INJECTION, SOLUTION INTRAVENOUS at 16:55

## 2017-12-28 RX ADMIN — FAMOTIDINE 20 MG: 10 INJECTION, SOLUTION INTRAVENOUS at 14:32

## 2017-12-28 RX ADMIN — BUPROPION HYDROCHLORIDE 300 MG: 300 TABLET, EXTENDED RELEASE ORAL at 20:50

## 2017-12-28 RX ADMIN — TRAZODONE HYDROCHLORIDE 200 MG: 100 TABLET, FILM COATED ORAL at 20:51

## 2017-12-28 RX ADMIN — FENTANYL CITRATE 50 MCG: 50 INJECTION, SOLUTION INTRAMUSCULAR; INTRAVENOUS at 17:35

## 2017-12-28 RX ADMIN — CEFAZOLIN SODIUM 2 G: 2 INJECTION, SOLUTION INTRAVENOUS at 23:19

## 2017-12-28 RX ADMIN — LIDOCAINE HYDROCHLORIDE 100 MG: 20 INJECTION, SOLUTION INFILTRATION; PERINEURAL at 15:37

## 2017-12-28 RX ADMIN — ONDANSETRON 4 MG: 2 INJECTION INTRAMUSCULAR; INTRAVENOUS at 20:36

## 2017-12-28 RX ADMIN — FENTANYL CITRATE 100 MCG: 50 INJECTION INTRAMUSCULAR; INTRAVENOUS at 15:37

## 2017-12-28 RX ADMIN — ONDANSETRON 4 MG: 2 INJECTION INTRAMUSCULAR; INTRAVENOUS at 16:47

## 2017-12-28 RX ADMIN — GABAPENTIN 400 MG: 400 CAPSULE ORAL at 20:50

## 2017-12-28 RX ADMIN — FENTANYL CITRATE 50 MCG: 50 INJECTION, SOLUTION INTRAMUSCULAR; INTRAVENOUS at 18:03

## 2017-12-28 RX ADMIN — Medication: at 17:44

## 2017-12-28 RX ADMIN — Medication 1 MG: at 15:23

## 2017-12-28 RX ADMIN — HYDROCODONE BITARTRATE AND ACETAMINOPHEN 1 TABLET: 7.5; 325 TABLET ORAL at 23:19

## 2017-12-28 RX ADMIN — FLUOXETINE HYDROCHLORIDE 60 MG: 20 CAPSULE ORAL at 20:51

## 2017-12-28 RX ADMIN — CEFAZOLIN SODIUM 2 G: 2 INJECTION, SOLUTION INTRAVENOUS at 15:53

## 2017-12-28 RX ADMIN — ROCURONIUM BROMIDE 50 MG: 10 INJECTION INTRAVENOUS at 15:41

## 2017-12-28 RX ADMIN — GLYCOPYRROLATE 0.6 MG: 0.2 INJECTION INTRAMUSCULAR; INTRAVENOUS at 17:13

## 2017-12-28 RX ADMIN — EPHEDRINE SULFATE 5 MG: 50 INJECTION INTRAMUSCULAR; INTRAVENOUS; SUBCUTANEOUS at 16:41

## 2017-12-28 RX ADMIN — PROMETHAZINE HYDROCHLORIDE 12.5 MG: 25 INJECTION INTRAMUSCULAR; INTRAVENOUS at 17:41

## 2017-12-28 RX ADMIN — PROPOFOL 200 MG: 10 INJECTION, EMULSION INTRAVENOUS at 15:37

## 2017-12-28 RX ADMIN — LAMOTRIGINE 200 MG: 100 TABLET ORAL at 20:51

## 2017-12-28 RX ADMIN — POTASSIUM CHLORIDE, DEXTROSE MONOHYDRATE AND SODIUM CHLORIDE 100 ML/HR: 150; 5; 450 INJECTION, SOLUTION INTRAVENOUS at 20:50

## 2017-12-28 NOTE — ANESTHESIA POSTPROCEDURE EVALUATION
"Patient: Machelle Ortega    Procedure Summary     Date Anesthesia Start Anesthesia Stop Room / Location    12/28/17 1534 1726  [facility] /  [facility]       Procedure Diagnosis Surgeon Provider    SPINAL CORD STIMULATOR INSERTION PHASE 1,  laminotomy for placement of [employer] lead  from T9 to T6 (Bilateral Back) DDD (degenerative disc disease), lumbar; History of spinal surgery; History of benign schwannoma  (DDD (degenerative disc disease), lumbar [M51.36]; History of spinal surgery [Z98.890]; History of benign schwannoma [Z86.018]) MD Gretel Fair MD          Anesthesia Type: general  Last vitals  BP   102/43 (12/28/17 1328)   Temp   37.1 °C (98.7 °F) (12/28/17 1328)   Pulse   65 (12/28/17 1523)   Resp   18 (12/28/17 1523)     SpO2   100 % (12/28/17 1523)     Post Anesthesia Care and Evaluation    Patient location during evaluation: PACU  Patient participation: complete - patient participated  Level of consciousness: awake and alert  Pain management: adequate  Airway patency: patent  Anesthetic complications: No anesthetic complications    Cardiovascular status: acceptable  Respiratory status: acceptable  Hydration status: acceptable    Comments: /43 (BP Location: Right arm, Patient Position: Lying)  Pulse 65  Temp 37.1 °C (98.7 °F) (Oral)   Resp 18  Ht 163.8 cm (64.5\")  Wt 84.2 kg (185 lb 9.6 oz)  SpO2 100%  BMI 31.37 kg/m2      "

## 2017-12-28 NOTE — ANESTHESIA PREPROCEDURE EVALUATION
Anesthesia Evaluation     Patient summary reviewed and Nursing notes reviewed   NPO Solid Status: > 8 hours  NPO Liquid Status: > 4 hours     Airway   Mallampati: II  Neck ROM: full  no difficulty expected  Dental - normal exam     Pulmonary     breath sounds clear to auscultation  (+) asthma, shortness of breath, sleep apnea on CPAP,   Cardiovascular     Rhythm: regular    (+) hyperlipidemia      Neuro/Psych  (+) numbness, psychiatric history Depression and Anxiety,     GI/Hepatic/Renal/Endo    (+) obesity,  GERD,     Musculoskeletal     (+) myalgias, neck pain,   Abdominal   (+) obese,    Substance History      OB/GYN          Other   (+) arthritis       Other Comment: Right shoulder rotator cuff issue                                        Anesthesia Plan    ASA 3     general   (Prone position discussed)  intravenous induction   Anesthetic plan and risks discussed with patient.

## 2017-12-28 NOTE — ANESTHESIA PROCEDURE NOTES
Airway  Urgency: elective      General Information and Staff    Patient location during procedure: OR  Anesthesiologist: MARIBEL GONZALES    Indications and Patient Condition  Indications for airway management: airway protection    Preoxygenated: yes  Mask difficulty assessment: 1 - vent by mask    Final Airway Details  Final airway type: endotracheal airway      Successful airway: ETT  Cuffed: yes   Successful intubation technique: direct laryngoscopy and video laryngoscopy  Facilitating devices/methods: intubating stylet  Endotracheal tube insertion site: oral  Blade: CMAC  Blade size: #3  ETT size: 7.0 mm  Placement verified by: chest auscultation and capnometry   Measured from: gums  Number of attempts at approach: 2    Additional Comments  DL x 1, epiglottis visualized.  CMAC x 1 w/ VCV, 7 ETT w/o trauma. Dental unchanged.

## 2017-12-29 ENCOUNTER — ANESTHESIA (OUTPATIENT)
Dept: PERIOP | Facility: HOSPITAL | Age: 52
End: 2017-12-29

## 2017-12-29 ENCOUNTER — ANESTHESIA EVENT (OUTPATIENT)
Dept: PERIOP | Facility: HOSPITAL | Age: 52
End: 2017-12-29

## 2017-12-29 PROCEDURE — 99024 POSTOP FOLLOW-UP VISIT: CPT | Performed by: PHYSICIAN ASSISTANT

## 2017-12-29 PROCEDURE — 25010000002 ONDANSETRON PER 1 MG: Performed by: ANESTHESIOLOGY

## 2017-12-29 PROCEDURE — 25010000002 FENTANYL CITRATE (PF) 100 MCG/2ML SOLUTION: Performed by: ANESTHESIOLOGY

## 2017-12-29 PROCEDURE — 25010000002 MIDAZOLAM PER 1 MG: Performed by: ANESTHESIOLOGY

## 2017-12-29 PROCEDURE — C1787 PATIENT PROGR, NEUROSTIM: HCPCS | Performed by: NEUROLOGICAL SURGERY

## 2017-12-29 PROCEDURE — C1820 GENERATOR NEURO RECHG BAT SY: HCPCS | Performed by: NEUROLOGICAL SURGERY

## 2017-12-29 PROCEDURE — 25010000003 CEFAZOLIN IN DEXTROSE 2-4 GM/100ML-% SOLUTION: Performed by: NEUROLOGICAL SURGERY

## 2017-12-29 PROCEDURE — 63685 INS/RPLC SPI NPG/RCVR POCKET: CPT | Performed by: NEUROLOGICAL SURGERY

## 2017-12-29 PROCEDURE — 25010000002 PROPOFOL 10 MG/ML EMULSION: Performed by: ANESTHESIOLOGY

## 2017-12-29 PROCEDURE — 25010000002 ONDANSETRON PER 1 MG: Performed by: NEUROLOGICAL SURGERY

## 2017-12-29 PROCEDURE — 25010000002 PHENYLEPHRINE PER 1 ML: Performed by: ANESTHESIOLOGY

## 2017-12-29 PROCEDURE — L8689 EXTERNAL RECHARG SYS INTERN: HCPCS | Performed by: NEUROLOGICAL SURGERY

## 2017-12-29 PROCEDURE — 25010000002 DEXAMETHASONE PER 1 MG: Performed by: ANESTHESIOLOGY

## 2017-12-29 DEVICE — IMPLANTABLE PULSE GENERATOR KIT
Type: IMPLANTABLE DEVICE | Site: BACK | Status: FUNCTIONAL
Brand: PRECISION SPECTRA ™

## 2017-12-29 RX ORDER — MIDAZOLAM HYDROCHLORIDE 1 MG/ML
2 INJECTION INTRAMUSCULAR; INTRAVENOUS
Status: DISCONTINUED | OUTPATIENT
Start: 2017-12-29 | End: 2017-12-29 | Stop reason: HOSPADM

## 2017-12-29 RX ORDER — PROMETHAZINE HYDROCHLORIDE 25 MG/ML
12.5 INJECTION, SOLUTION INTRAMUSCULAR; INTRAVENOUS ONCE AS NEEDED
Status: DISCONTINUED | OUTPATIENT
Start: 2017-12-29 | End: 2017-12-29 | Stop reason: HOSPADM

## 2017-12-29 RX ORDER — FENTANYL CITRATE 50 UG/ML
50 INJECTION, SOLUTION INTRAMUSCULAR; INTRAVENOUS
Status: DISCONTINUED | OUTPATIENT
Start: 2017-12-29 | End: 2017-12-29 | Stop reason: HOSPADM

## 2017-12-29 RX ORDER — LIDOCAINE HYDROCHLORIDE 20 MG/ML
INJECTION, SOLUTION INFILTRATION; PERINEURAL AS NEEDED
Status: DISCONTINUED | OUTPATIENT
Start: 2017-12-29 | End: 2017-12-29 | Stop reason: SURG

## 2017-12-29 RX ORDER — MAGNESIUM HYDROXIDE 1200 MG/15ML
LIQUID ORAL AS NEEDED
Status: DISCONTINUED | OUTPATIENT
Start: 2017-12-29 | End: 2017-12-29 | Stop reason: HOSPADM

## 2017-12-29 RX ORDER — PROMETHAZINE HYDROCHLORIDE 25 MG/1
12.5 TABLET ORAL ONCE AS NEEDED
Status: DISCONTINUED | OUTPATIENT
Start: 2017-12-29 | End: 2017-12-29 | Stop reason: HOSPADM

## 2017-12-29 RX ORDER — MIDAZOLAM HYDROCHLORIDE 1 MG/ML
INJECTION INTRAMUSCULAR; INTRAVENOUS AS NEEDED
Status: DISCONTINUED | OUTPATIENT
Start: 2017-12-29 | End: 2017-12-29 | Stop reason: SURG

## 2017-12-29 RX ORDER — DEXAMETHASONE SODIUM PHOSPHATE 10 MG/ML
INJECTION INTRAMUSCULAR; INTRAVENOUS AS NEEDED
Status: DISCONTINUED | OUTPATIENT
Start: 2017-12-29 | End: 2017-12-29 | Stop reason: SURG

## 2017-12-29 RX ORDER — ONDANSETRON 2 MG/ML
4 INJECTION INTRAMUSCULAR; INTRAVENOUS ONCE AS NEEDED
Status: DISCONTINUED | OUTPATIENT
Start: 2017-12-29 | End: 2017-12-29 | Stop reason: HOSPADM

## 2017-12-29 RX ORDER — HYDROCODONE BITARTRATE AND ACETAMINOPHEN 7.5; 325 MG/1; MG/1
1 TABLET ORAL EVERY 4 HOURS PRN
Qty: 40 TABLET | Refills: 0 | Status: SHIPPED | OUTPATIENT
Start: 2017-12-29 | End: 2018-01-07

## 2017-12-29 RX ORDER — DIPHENHYDRAMINE HYDROCHLORIDE 50 MG/ML
12.5 INJECTION INTRAMUSCULAR; INTRAVENOUS
Status: DISCONTINUED | OUTPATIENT
Start: 2017-12-29 | End: 2017-12-29 | Stop reason: HOSPADM

## 2017-12-29 RX ORDER — FAMOTIDINE 10 MG/ML
20 INJECTION, SOLUTION INTRAVENOUS ONCE
Status: COMPLETED | OUTPATIENT
Start: 2017-12-29 | End: 2017-12-29

## 2017-12-29 RX ORDER — SODIUM CHLORIDE 0.9 % (FLUSH) 0.9 %
1-10 SYRINGE (ML) INJECTION AS NEEDED
Status: DISCONTINUED | OUTPATIENT
Start: 2017-12-29 | End: 2017-12-29 | Stop reason: HOSPADM

## 2017-12-29 RX ORDER — MIDAZOLAM HYDROCHLORIDE 1 MG/ML
1 INJECTION INTRAMUSCULAR; INTRAVENOUS
Status: DISCONTINUED | OUTPATIENT
Start: 2017-12-29 | End: 2017-12-29 | Stop reason: HOSPADM

## 2017-12-29 RX ORDER — ONDANSETRON 2 MG/ML
INJECTION INTRAMUSCULAR; INTRAVENOUS AS NEEDED
Status: DISCONTINUED | OUTPATIENT
Start: 2017-12-29 | End: 2017-12-29 | Stop reason: SURG

## 2017-12-29 RX ORDER — HYDROMORPHONE HYDROCHLORIDE 1 MG/ML
0.5 INJECTION, SOLUTION INTRAMUSCULAR; INTRAVENOUS; SUBCUTANEOUS
Status: DISCONTINUED | OUTPATIENT
Start: 2017-12-29 | End: 2017-12-29 | Stop reason: HOSPADM

## 2017-12-29 RX ORDER — CEFAZOLIN SODIUM 2 G/100ML
2 INJECTION, SOLUTION INTRAVENOUS EVERY 8 HOURS
Status: DISCONTINUED | OUTPATIENT
Start: 2017-12-29 | End: 2017-12-30 | Stop reason: HOSPADM

## 2017-12-29 RX ORDER — GLYCOPYRROLATE 0.2 MG/ML
INJECTION INTRAMUSCULAR; INTRAVENOUS AS NEEDED
Status: DISCONTINUED | OUTPATIENT
Start: 2017-12-29 | End: 2017-12-29 | Stop reason: SURG

## 2017-12-29 RX ORDER — HYDRALAZINE HYDROCHLORIDE 20 MG/ML
5 INJECTION INTRAMUSCULAR; INTRAVENOUS
Status: DISCONTINUED | OUTPATIENT
Start: 2017-12-29 | End: 2017-12-29 | Stop reason: HOSPADM

## 2017-12-29 RX ORDER — BUPIVACAINE HYDROCHLORIDE AND EPINEPHRINE 2.5; 5 MG/ML; UG/ML
INJECTION, SOLUTION INFILTRATION; PERINEURAL AS NEEDED
Status: DISCONTINUED | OUTPATIENT
Start: 2017-12-29 | End: 2017-12-29 | Stop reason: HOSPADM

## 2017-12-29 RX ORDER — FENTANYL CITRATE 50 UG/ML
INJECTION, SOLUTION INTRAMUSCULAR; INTRAVENOUS AS NEEDED
Status: DISCONTINUED | OUTPATIENT
Start: 2017-12-29 | End: 2017-12-29 | Stop reason: SURG

## 2017-12-29 RX ORDER — PROMETHAZINE HYDROCHLORIDE 25 MG/1
25 SUPPOSITORY RECTAL ONCE AS NEEDED
Status: DISCONTINUED | OUTPATIENT
Start: 2017-12-29 | End: 2017-12-29 | Stop reason: HOSPADM

## 2017-12-29 RX ORDER — ROCURONIUM BROMIDE 10 MG/ML
INJECTION, SOLUTION INTRAVENOUS AS NEEDED
Status: DISCONTINUED | OUTPATIENT
Start: 2017-12-29 | End: 2017-12-29 | Stop reason: SURG

## 2017-12-29 RX ORDER — SODIUM CHLORIDE, SODIUM LACTATE, POTASSIUM CHLORIDE, CALCIUM CHLORIDE 600; 310; 30; 20 MG/100ML; MG/100ML; MG/100ML; MG/100ML
9 INJECTION, SOLUTION INTRAVENOUS CONTINUOUS
Status: DISCONTINUED | OUTPATIENT
Start: 2017-12-29 | End: 2017-12-29

## 2017-12-29 RX ORDER — PROPOFOL 10 MG/ML
VIAL (ML) INTRAVENOUS AS NEEDED
Status: DISCONTINUED | OUTPATIENT
Start: 2017-12-29 | End: 2017-12-29 | Stop reason: SURG

## 2017-12-29 RX ORDER — CEPHALEXIN 500 MG/1
500 CAPSULE ORAL 4 TIMES DAILY
Qty: 28 CAPSULE | Refills: 0 | Status: SHIPPED | OUTPATIENT
Start: 2017-12-29 | End: 2018-01-12

## 2017-12-29 RX ORDER — PROMETHAZINE HYDROCHLORIDE 25 MG/1
25 TABLET ORAL ONCE AS NEEDED
Status: DISCONTINUED | OUTPATIENT
Start: 2017-12-29 | End: 2017-12-29 | Stop reason: HOSPADM

## 2017-12-29 RX ADMIN — SODIUM CHLORIDE, POTASSIUM CHLORIDE, SODIUM LACTATE AND CALCIUM CHLORIDE 9 ML/HR: 600; 310; 30; 20 INJECTION, SOLUTION INTRAVENOUS at 15:59

## 2017-12-29 RX ADMIN — DEXAMETHASONE SODIUM PHOSPHATE 8 MG: 10 INJECTION INTRAMUSCULAR; INTRAVENOUS at 16:51

## 2017-12-29 RX ADMIN — ONDANSETRON 4 MG: 2 INJECTION INTRAMUSCULAR; INTRAVENOUS at 16:58

## 2017-12-29 RX ADMIN — SUGAMMADEX 170 MG: 100 INJECTION, SOLUTION INTRAVENOUS at 16:58

## 2017-12-29 RX ADMIN — PHENYLEPHRINE HYDROCHLORIDE 100 MCG: 10 INJECTION INTRAVENOUS at 16:18

## 2017-12-29 RX ADMIN — PHENYLEPHRINE HYDROCHLORIDE 100 MCG: 10 INJECTION INTRAVENOUS at 16:46

## 2017-12-29 RX ADMIN — CEFAZOLIN SODIUM 2 G: 2 INJECTION, SOLUTION INTRAVENOUS at 06:39

## 2017-12-29 RX ADMIN — PROPOFOL 100 MG: 10 INJECTION, EMULSION INTRAVENOUS at 16:18

## 2017-12-29 RX ADMIN — FENTANYL CITRATE 50 MCG: 50 INJECTION INTRAMUSCULAR; INTRAVENOUS at 16:18

## 2017-12-29 RX ADMIN — POTASSIUM CHLORIDE, DEXTROSE MONOHYDRATE AND SODIUM CHLORIDE 100 ML/HR: 150; 5; 450 INJECTION, SOLUTION INTRAVENOUS at 07:16

## 2017-12-29 RX ADMIN — Medication 1 MG: at 16:18

## 2017-12-29 RX ADMIN — FENTANYL CITRATE 50 MCG: 50 INJECTION, SOLUTION INTRAMUSCULAR; INTRAVENOUS at 16:00

## 2017-12-29 RX ADMIN — CEFAZOLIN SODIUM 2 G: 2 INJECTION, SOLUTION INTRAVENOUS at 15:14

## 2017-12-29 RX ADMIN — POTASSIUM CHLORIDE, DEXTROSE MONOHYDRATE AND SODIUM CHLORIDE 100 ML/HR: 150; 5; 450 INJECTION, SOLUTION INTRAVENOUS at 20:56

## 2017-12-29 RX ADMIN — ONDANSETRON 4 MG: 2 INJECTION INTRAMUSCULAR; INTRAVENOUS at 20:58

## 2017-12-29 RX ADMIN — FLUOXETINE HYDROCHLORIDE 60 MG: 20 CAPSULE ORAL at 20:57

## 2017-12-29 RX ADMIN — LAMOTRIGINE 200 MG: 100 TABLET ORAL at 20:56

## 2017-12-29 RX ADMIN — CEFAZOLIN SODIUM 2 G: 2 INJECTION, SOLUTION INTRAVENOUS at 22:42

## 2017-12-29 RX ADMIN — CYCLOBENZAPRINE HYDROCHLORIDE 10 MG: 10 TABLET, FILM COATED ORAL at 20:58

## 2017-12-29 RX ADMIN — HYDROCODONE BITARTRATE AND ACETAMINOPHEN 1 TABLET: 7.5; 325 TABLET ORAL at 20:58

## 2017-12-29 RX ADMIN — DOCUSATE SODIUM -SENNOSIDES 1 TABLET: 50; 8.6 TABLET, COATED ORAL at 20:56

## 2017-12-29 RX ADMIN — GLYCOPYRROLATE 0.2 MG: 0.2 INJECTION INTRAMUSCULAR; INTRAVENOUS at 16:18

## 2017-12-29 RX ADMIN — PHENYLEPHRINE HYDROCHLORIDE 100 MCG: 10 INJECTION INTRAVENOUS at 16:51

## 2017-12-29 RX ADMIN — TRAZODONE HYDROCHLORIDE 200 MG: 100 TABLET, FILM COATED ORAL at 20:57

## 2017-12-29 RX ADMIN — FAMOTIDINE 20 MG: 10 INJECTION, SOLUTION INTRAVENOUS at 15:59

## 2017-12-29 RX ADMIN — ONDANSETRON 4 MG: 2 INJECTION INTRAMUSCULAR; INTRAVENOUS at 05:34

## 2017-12-29 RX ADMIN — HYDROCODONE BITARTRATE AND ACETAMINOPHEN 1 TABLET: 7.5; 325 TABLET ORAL at 05:26

## 2017-12-29 RX ADMIN — GABAPENTIN 400 MG: 400 CAPSULE ORAL at 20:57

## 2017-12-29 RX ADMIN — Medication 2 MG: at 16:00

## 2017-12-29 RX ADMIN — FENTANYL CITRATE 50 MCG: 50 INJECTION INTRAMUSCULAR; INTRAVENOUS at 16:42

## 2017-12-29 RX ADMIN — LIDOCAINE HYDROCHLORIDE 80 MG: 20 INJECTION, SOLUTION INFILTRATION; PERINEURAL at 16:18

## 2017-12-29 RX ADMIN — ROCURONIUM BROMIDE 50 MG: 10 INJECTION INTRAVENOUS at 16:18

## 2017-12-29 NOTE — ANESTHESIA PREPROCEDURE EVALUATION
Anesthesia Evaluation     Patient summary reviewed and Nursing notes reviewed   NPO Solid Status: > 8 hours  NPO Liquid Status: > 2 hours     Airway   Mallampati: II  Dental - normal exam     Pulmonary - normal exam   (+) asthma, shortness of breath, sleep apnea on CPAP,   Cardiovascular - normal exam    (+) hyperlipidemia      Neuro/Psych  (+) numbness, psychiatric history Depression,     GI/Hepatic/Renal/Endo    (+)  GERD,     Musculoskeletal     (+) myalgias, neck pain,   Abdominal    Substance History      OB/GYN          Other   (+) arthritis                                             Anesthesia Plan    ASA 3     general   (Used CMAC to intubate yesterday)  intravenous induction   Anesthetic plan and risks discussed with patient.

## 2017-12-29 NOTE — ANESTHESIA PROCEDURE NOTES
Airway  Urgency: elective    Airway not difficult    General Information and Staff    Patient location during procedure: OR  Anesthesiologist: REI ANDRADE    Indications and Patient Condition  Indications for airway management: airway protection    Preoxygenated: yes  Mask difficulty assessment: 1 - vent by mask    Final Airway Details  Final airway type: endotracheal airway      Successful airway: ETT  Cuffed: yes   Successful intubation technique: video laryngoscopy  Facilitating devices/methods: intubating stylet  Endotracheal tube insertion site: oral  Blade: CMAC  ETT size: 7.0 mm  Cormack-Lehane Classification: grade I - full view of glottis  Placement verified by: chest auscultation and capnometry   Cuff volume (mL): 5  Measured from: teeth  ETT to teeth (cm): 22  Number of attempts at approach: 1    Additional Comments  CMAC used to avoid dental trauma

## 2017-12-29 NOTE — ANESTHESIA POSTPROCEDURE EVALUATION
Patient: Machelle Ortega    Procedure Summary     Date Anesthesia Start Anesthesia Stop Room / Location    12/29/17 0969 4435  [facility] /  [facility]       Procedure Diagnosis Surgeon Provider    SPINAL CORD STIMULATOR INSERTION PHASE 2 (N/A Back) DDD (degenerative disc disease), lumbar; History of spinal surgery; History of benign schwannoma  (DDD (degenerative disc disease), lumbar [M51.36]; History of spinal surgery [Z98.890]; History of benign schwannoma [Z86.018]) MD Cole Fair MD          Anesthesia Type: general  Last vitals  BP   112/60 (12/29/17 1800)   Temp   37.1 °C (98.8 °F) (12/29/17 1808)   Pulse   80 (12/29/17 1808)   Resp   16 (12/29/17 1800)     SpO2   96 % (12/29/17 1808)     Post Anesthesia Care and Evaluation    Patient location during evaluation: PACU  Anesthetic complications: No anesthetic complications

## 2017-12-30 VITALS
HEIGHT: 65 IN | OXYGEN SATURATION: 94 % | RESPIRATION RATE: 18 BRPM | HEART RATE: 62 BPM | SYSTOLIC BLOOD PRESSURE: 92 MMHG | BODY MASS INDEX: 30.92 KG/M2 | TEMPERATURE: 98 F | WEIGHT: 185.6 LBS | DIASTOLIC BLOOD PRESSURE: 54 MMHG

## 2017-12-30 PROCEDURE — 99024 POSTOP FOLLOW-UP VISIT: CPT | Performed by: NURSE PRACTITIONER

## 2017-12-30 PROCEDURE — 25010000003 CEFAZOLIN IN DEXTROSE 2-4 GM/100ML-% SOLUTION: Performed by: NEUROLOGICAL SURGERY

## 2017-12-30 PROCEDURE — 25010000002 ONDANSETRON PER 1 MG: Performed by: NEUROLOGICAL SURGERY

## 2017-12-30 RX ORDER — ONDANSETRON 4 MG/1
4 TABLET, FILM COATED ORAL EVERY 8 HOURS PRN
Qty: 20 TABLET | Refills: 0 | Status: SHIPPED | OUTPATIENT
Start: 2017-12-30 | End: 2018-10-03

## 2017-12-30 RX ADMIN — GABAPENTIN 400 MG: 400 CAPSULE ORAL at 08:42

## 2017-12-30 RX ADMIN — ONDANSETRON 4 MG: 2 INJECTION INTRAMUSCULAR; INTRAVENOUS at 11:24

## 2017-12-30 RX ADMIN — HYDROCODONE BITARTRATE AND ACETAMINOPHEN 1 TABLET: 7.5; 325 TABLET ORAL at 01:07

## 2017-12-30 RX ADMIN — HYDROCODONE BITARTRATE AND ACETAMINOPHEN 1 TABLET: 7.5; 325 TABLET ORAL at 05:07

## 2017-12-30 RX ADMIN — CEFAZOLIN SODIUM 2 G: 2 INJECTION, SOLUTION INTRAVENOUS at 06:39

## 2018-01-04 ENCOUNTER — TELEPHONE (OUTPATIENT)
Dept: NEUROSURGERY | Facility: CLINIC | Age: 53
End: 2018-01-04

## 2018-01-04 NOTE — TELEPHONE ENCOUNTER
I attempted to contact Ms. Ortega for a post op check.  There was no answer, I didn't leave a message.

## 2018-01-12 ENCOUNTER — OFFICE VISIT (OUTPATIENT)
Dept: NEUROSURGERY | Facility: CLINIC | Age: 53
End: 2018-01-12

## 2018-01-12 VITALS
WEIGHT: 184 LBS | HEART RATE: 64 BPM | SYSTOLIC BLOOD PRESSURE: 116 MMHG | DIASTOLIC BLOOD PRESSURE: 70 MMHG | BODY MASS INDEX: 30.66 KG/M2 | HEIGHT: 65 IN

## 2018-01-12 DIAGNOSIS — R11.0 NAUSEA: ICD-10-CM

## 2018-01-12 DIAGNOSIS — Z09 SURGICAL FOLLOWUP VISIT: Primary | ICD-10-CM

## 2018-01-12 PROCEDURE — 99024 POSTOP FOLLOW-UP VISIT: CPT | Performed by: NURSE PRACTITIONER

## 2018-01-12 RX ORDER — METOCLOPRAMIDE 5 MG/1
5 TABLET ORAL
Qty: 40 TABLET | Refills: 0 | Status: SHIPPED | OUTPATIENT
Start: 2018-01-12 | End: 2018-10-03

## 2018-01-12 NOTE — PROGRESS NOTES
Subjective   Patient ID: Machelle Ortega is a 52 y.o. female is here today for follow-up on back and bilateral leg pain. Pt is 2 weeks out from a SCS phase 1 laminectomy for placement of [employer] lead T9 to T6 on 12/28/2017 and SCS 2nd phase on 12/29/2017 by Dr. Jarrett.   Pt is currently taking Flexeril 10 mg PRN pain and Gabapentin 400 mg BID.   Pt states she is having a lot of nausea since surgery.   Pt states her left leg is very weak and she has fell a few times since surgery.   Incision is clean, dry and without redness. Pt denies any fever.     HPI Comments: Ms. Ortega is 2 weeks out from a spinal cord stimulator leads and IPG placement with Dr. Jarrett.  She has noticed some improvement but is still not real confident with the coverage that she is getting.  She reports some persistent episodes of nausea but no vomiting.  She reduced her pain medications thinking that this may have been the cause.  It did not make any difference in the episodes of nausea.  She has a history of bladder incontinence.  For the first week or so after surgery she was having some problems with this but it is slowly getting better.  She denies any issues with the incisions.  No redness, swelling, drainage.  She is remained afebrile.  Completed the Keflex prescription without difficulty.    Back Pain   This is a chronic problem. The current episode started more than 1 year ago. The problem occurs daily. The problem has been gradually improving since onset. The pain is present in the lumbar spine. The quality of the pain is described as aching, burning, cramping, shooting and stabbing. The pain radiates to the left knee, left thigh, right foot, right knee, right thigh and left foot. The pain is at a severity of 6/10. The pain is moderate. The pain is the same all the time. The symptoms are aggravated by bending and twisting. Stiffness is present all day. Associated symptoms include bladder incontinence (Pt states the first  2 weeks after surgery she did not have any conrol of her bladder. It  has progessively gotten better. Urinary incontinence has been a chronic issue. She has a history of bladder sling. She has not seen her urologist in quite some time. ), leg pain (bilateral legs), numbness (bilateral legs), tingling (bilateral legs) and weakness (Left leg is weak and pt reports she has fallen a few times since surgery. ). Pertinent negatives include no bowel incontinence or chest pain. She has tried ice and muscle relaxant (SCS phase 1 laminectomy for placement of [employer] lead T9 to T6 on 12/28/2017 and SCS 2nd phase on 12/29/2017 by Dr. Jarrett. ) for the symptoms. The treatment provided mild relief.         Review of Systems   Respiratory: Negative for chest tightness and shortness of breath.    Cardiovascular: Negative for chest pain.   Gastrointestinal: Negative for bowel incontinence.   Genitourinary: Positive for bladder incontinence (Pt states the first 2 weeks after surgery she did not have any conrol of her bladder. It  has progessively gotten better. Urinary incontinence has been a chronic issue. She has a history of bladder sling. She has not seen her urologist in quite some time. ).   Musculoskeletal: Positive for back pain and gait problem (Pt states she has fell a few times since surgery because of leg weakness. ).   Neurological: Positive for tingling (bilateral legs), weakness (Left leg is weak and pt reports she has fallen a few times since surgery. ) and numbness (bilateral legs).   All other systems reviewed and are negative.      Objective   Physical Exam   Constitutional: She appears well-developed. No distress.   Musculoskeletal: Normal range of motion. She exhibits no tenderness.   Skin: Skin is warm and dry.   The thoracic and left buttock incisions are well approximated.  No redness, swelling or drainage.    Psychiatric: She has a normal mood and affect.   Vitals reviewed.    Neurologic Exam      Mental Status   Knowledge: good.       Assessment/Plan     Medical Decision Making:      Ms. Ortega is 2 weeks out from the above stated surgery.  She is not sure whether she is getting adequate coverage.  She has met with the representative today to work on some programming eye.  Overall she is pleased with her progress.  She is uncertain as to why she continues to have some nausea.  She did go home with prescription for Zofran which she states was ineffective.  She requested a prescription for Phenergan but I preferred that she try some Reglan instead.  I also recommended stool softeners and laxatives if needed.  I did instruct her to contact her primary care physician however if the nausea does not improve over the next several days. She has had some return in urinary incontinence issues which is a chronic problem for her.  It is getting better.  She denies any dysuria, fever, or chills.  If this does not improve over the next couple of days, I recommended she speak to either her primary care physician or her urologist.      The thoracic incision and left gluteal incision has healed very well.  There is no signs of redness, swelling, or drainage.  Ms. Ortega will continue working with her pain management physician and call the office if she experiences any worsening symptoms or develops any issues with her incisions.  She will be seen as needed from here.    Diagnoses and all orders for this visit:    Surgical followup visit    Nausea    Other orders  -     metoclopramide (REGLAN) 5 MG tablet; Take 1 tablet by mouth 4 (Four) Times a Day Before Meals & at Bedtime.      Return if symptoms worsen or fail to improve.

## 2018-06-12 ENCOUNTER — OFFICE VISIT (OUTPATIENT)
Dept: INTERNAL MEDICINE | Facility: CLINIC | Age: 53
End: 2018-06-12

## 2018-06-12 VITALS
SYSTOLIC BLOOD PRESSURE: 110 MMHG | OXYGEN SATURATION: 99 % | TEMPERATURE: 97.8 F | DIASTOLIC BLOOD PRESSURE: 78 MMHG | HEIGHT: 65 IN | HEART RATE: 82 BPM | WEIGHT: 189 LBS | BODY MASS INDEX: 31.49 KG/M2

## 2018-06-12 DIAGNOSIS — J02.9 SORE THROAT: ICD-10-CM

## 2018-06-12 DIAGNOSIS — J02.0 STREP PHARYNGITIS: Primary | ICD-10-CM

## 2018-06-12 LAB
EXPIRATION DATE: ABNORMAL
INTERNAL CONTROL: ABNORMAL
Lab: ABNORMAL
S PYO AG THROAT QL: POSITIVE

## 2018-06-12 PROCEDURE — 87880 STREP A ASSAY W/OPTIC: CPT | Performed by: NURSE PRACTITIONER

## 2018-06-12 PROCEDURE — 99213 OFFICE O/P EST LOW 20 MIN: CPT | Performed by: NURSE PRACTITIONER

## 2018-06-12 RX ORDER — AMOXICILLIN 875 MG/1
875 TABLET, COATED ORAL EVERY 12 HOURS SCHEDULED
Qty: 14 TABLET | Refills: 0 | Status: SHIPPED | OUTPATIENT
Start: 2018-06-12 | End: 2018-06-19

## 2018-06-12 NOTE — PROGRESS NOTES
Subjective   Machelle Ortega is a 52 y.o. female who presents due to respiratory symptoms.    URI    This is a new problem. The current episode started in the past 7 days. The problem has been gradually worsening. There has been no fever. Associated symptoms include congestion, ear pain (pressure in both ears), headaches, nausea, neck pain, rhinorrhea and a sore throat. Pertinent negatives include no abdominal pain, chest pain, coughing, diarrhea, sneezing, vomiting or wheezing. Treatments tried: started Amoxil last night. The treatment provided no relief.        The following portions of the patient's history were reviewed and updated as appropriate: allergies, current medications, past social history and problem list.    Past Medical History:   Diagnosis Date   • Anxiety    • Arthritis    • Asthma Seasonally   • Attention deficit hyperactivity disorder    • Balance problem    • Chronic pain    • Claustrophobia    • DDD (degenerative disc disease), lumbar    • Depression    • Fatigue    • Fibromyalgia    • Fibromyositis    • GERD (gastroesophageal reflux disease)    • History of mononucleosis    • Hyperlipidemia    • IBS (irritable bowel syndrome)    • Iron deficiency anemia    • Lumbago    • Mood disorder    • Sleep apnea     Compliant w/ C-Pap         Current Outpatient Prescriptions:   •  amphetamine-dextroamphetamine (ADDERALL) 20 MG tablet, Take 40 mg by mouth Daily. HOLD PRIOR TO SURG, Disp: , Rfl:   •  buPROPion XL (WELLBUTRIN XL) 300 MG 24 hr tablet, Take 300 mg by mouth Every Evening., Disp: , Rfl:   •  cyclobenzaprine (FLEXERIL) 10 MG tablet, Take 10 mg by mouth 2 (Two) Times a Day As Needed for Muscle Spasms., Disp: , Rfl:   •  FLUoxetine (PROzac) 20 MG capsule, Take 60 mg by mouth Every Night., Disp: , Rfl:   •  gabapentin (NEURONTIN) 400 MG capsule, Take 1 capsule by mouth 3 (Three) Times a Day., Disp: 90 capsule, Rfl: 1  •  lamoTRIgine (LaMICtal) 200 MG tablet, Take 200 mg by mouth Every Evening., Disp:  ", Rfl: 0  •  metoclopramide (REGLAN) 5 MG tablet, Take 1 tablet by mouth 4 (Four) Times a Day Before Meals & at Bedtime., Disp: 40 tablet, Rfl: 0  •  ondansetron (ZOFRAN) 4 MG tablet, Take 1 tablet by mouth Every 8 (Eight) Hours As Needed for Nausea or Vomiting., Disp: 20 tablet, Rfl: 0  •  pantoprazole (PROTONIX) 40 MG EC tablet, Take 40 mg by mouth Every Evening., Disp: , Rfl:   •  traZODone (DESYREL) 100 MG tablet, Take 200 mg by mouth Every Night., Disp: , Rfl:   •  amoxicillin (AMOXIL) 875 MG tablet, Take 1 tablet by mouth Every 12 (Twelve) Hours for 7 days., Disp: 14 tablet, Rfl: 0    Allergies   Allergen Reactions   • Budesonide-Formoterol Fumarate Swelling     Throat   • Oxycodone-Acetaminophen    • Oxycodone-Acetaminophen Allergic reaction to contrast dye   • Pregabalin Other (See Comments)     Weight gain       Review of Systems   Constitutional: Negative for appetite change, chills, fatigue and fever.   HENT: Positive for congestion, ear pain (pressure in both ears), postnasal drip, rhinorrhea, sinus pressure and sore throat. Negative for ear discharge, facial swelling, sneezing and tinnitus.    Respiratory: Negative for cough, chest tightness, shortness of breath and wheezing.    Cardiovascular: Negative for chest pain, palpitations and leg swelling.   Gastrointestinal: Positive for nausea. Negative for abdominal pain, diarrhea and vomiting.   Musculoskeletal: Positive for arthralgias, back pain and neck pain. Negative for neck stiffness.   Neurological: Positive for headaches.   Hematological: Negative for adenopathy.       Objective   Vitals:    06/12/18 0926   BP: 110/78   BP Location: Left arm   Patient Position: Sitting   Cuff Size: Adult   Pulse: 82   Temp: 97.8 °F (36.6 °C)   TempSrc: Oral   SpO2: 99%   Weight: 85.7 kg (189 lb)   Height: 163.8 cm (64.5\")     Physical Exam   Constitutional: She appears well-developed and well-nourished. She is cooperative. She does not have a sickly appearance. She " does not appear ill.   HENT:   Head: Normocephalic.   Right Ear: Hearing and external ear normal. No drainage, swelling or tenderness. Tympanic membrane is bulging. Tympanic membrane is not erythematous. No middle ear effusion. No decreased hearing is noted.   Left Ear: Hearing and external ear normal. No drainage, swelling or tenderness. Tympanic membrane is bulging. Tympanic membrane is not erythematous.  No middle ear effusion. No decreased hearing is noted.   Nose: Nose normal. No mucosal edema, rhinorrhea, sinus tenderness or nasal deformity. Right sinus exhibits no maxillary sinus tenderness and no frontal sinus tenderness. Left sinus exhibits no maxillary sinus tenderness and no frontal sinus tenderness.   Mouth/Throat: Mucous membranes are normal. Posterior oropharyngeal erythema present.   Eyes: Conjunctivae and lids are normal.   Neck: Trachea normal.   Cardiovascular: Regular rhythm, normal heart sounds and normal pulses.    No murmur heard.  Pulmonary/Chest: Breath sounds normal. No respiratory distress. She has no decreased breath sounds. She has no wheezes. She has no rhonchi. She has no rales.   Lymphadenopathy:     She has no cervical adenopathy.   Neurological: She is alert.   Skin: Skin is warm, dry and intact.   Nursing note and vitals reviewed.      Assessment/Plan   Machelle was seen today for sore throat, sinus problem and earache.    Diagnoses and all orders for this visit:    Strep pharyngitis  -     amoxicillin (AMOXIL) 875 MG tablet; Take 1 tablet by mouth Every 12 (Twelve) Hours for 7 days.    Sore throat  -     POCT rapid strep A    Discussed anemia noted 12/2017 with pre-operative labs, will schedule f/u appt to re-evaluate.

## 2018-06-29 ENCOUNTER — OFFICE VISIT (OUTPATIENT)
Dept: INTERNAL MEDICINE | Facility: CLINIC | Age: 53
End: 2018-06-29

## 2018-06-29 VITALS
OXYGEN SATURATION: 97 % | HEIGHT: 65 IN | WEIGHT: 186 LBS | BODY MASS INDEX: 30.99 KG/M2 | SYSTOLIC BLOOD PRESSURE: 118 MMHG | DIASTOLIC BLOOD PRESSURE: 72 MMHG | HEART RATE: 78 BPM

## 2018-06-29 DIAGNOSIS — E78.5 HYPERLIPIDEMIA, UNSPECIFIED HYPERLIPIDEMIA TYPE: ICD-10-CM

## 2018-06-29 DIAGNOSIS — M79.2 NEUROPATHIC PAIN: ICD-10-CM

## 2018-06-29 DIAGNOSIS — K21.9 GASTROESOPHAGEAL REFLUX DISEASE, ESOPHAGITIS PRESENCE NOT SPECIFIED: ICD-10-CM

## 2018-06-29 DIAGNOSIS — Z11.59 SCREENING FOR VIRAL DISEASE: ICD-10-CM

## 2018-06-29 DIAGNOSIS — F39 MOOD DISORDER (HCC): ICD-10-CM

## 2018-06-29 DIAGNOSIS — D50.9 IRON DEFICIENCY ANEMIA, UNSPECIFIED IRON DEFICIENCY ANEMIA TYPE: Primary | ICD-10-CM

## 2018-06-29 DIAGNOSIS — M54.16 LUMBAR RADICULOPATHY: ICD-10-CM

## 2018-06-29 LAB
ALBUMIN SERPL-MCNC: 4.8 G/DL (ref 3.5–5.2)
ALBUMIN/GLOB SERPL: 1.8 G/DL
ALP SERPL-CCNC: 97 U/L (ref 39–117)
ALT SERPL W P-5'-P-CCNC: 9 U/L (ref 1–33)
ANION GAP SERPL CALCULATED.3IONS-SCNC: 11.3 MMOL/L
AST SERPL-CCNC: 11 U/L (ref 1–32)
BASOPHILS # BLD AUTO: 0.03 10*3/MM3 (ref 0–0.2)
BASOPHILS NFR BLD AUTO: 1 % (ref 0–2)
BILIRUB SERPL-MCNC: 0.2 MG/DL (ref 0.1–1.2)
BUN BLD-MCNC: 18 MG/DL (ref 6–20)
BUN/CREAT SERPL: 24 (ref 7–25)
CALCIUM SPEC-SCNC: 9.6 MG/DL (ref 8.6–10.5)
CHLORIDE SERPL-SCNC: 101 MMOL/L (ref 98–107)
CHOLEST SERPL-MCNC: 199 MG/DL (ref 0–200)
CO2 SERPL-SCNC: 26.7 MMOL/L (ref 22–29)
CREAT BLD-MCNC: 0.75 MG/DL (ref 0.57–1)
DEPRECATED RDW RBC AUTO: 40.9 FL (ref 37–54)
EOSINOPHIL # BLD AUTO: 0.1 10*3/MM3 (ref 0–0.7)
EOSINOPHIL NFR BLD AUTO: 3.2 % (ref 0–5)
ERYTHROCYTE [DISTWIDTH] IN BLOOD BY AUTOMATED COUNT: 12.6 % (ref 11.5–15)
FERRITIN SERPL-MCNC: 80.04 NG/ML (ref 13–150)
FOLATE SERPL-MCNC: 18.49 NG/ML (ref 4.78–24.2)
GFR SERPL CREATININE-BSD FRML MDRD: 81 ML/MIN/1.73
GLOBULIN UR ELPH-MCNC: 2.6 GM/DL
GLUCOSE BLD-MCNC: 86 MG/DL (ref 65–99)
HCT VFR BLD AUTO: 36.6 % (ref 34.1–44.9)
HDLC SERPL-MCNC: 59 MG/DL (ref 40–60)
HGB BLD-MCNC: 12.1 G/DL (ref 11.2–15.7)
IRON SATN MFR SERPL: 29 % (ref 20–50)
IRON SERPL-MCNC: 94 MCG/DL (ref 37–145)
LDLC SERPL CALC-MCNC: 121 MG/DL (ref 0–100)
LDLC/HDLC SERPL: 2.05 {RATIO}
LOPINAVIR ISLT PHENOTYP: 234 MCG/DL
LYMPHOCYTES # BLD AUTO: 0.87 10*3/MM3 (ref 0.8–7)
LYMPHOCYTES NFR BLD AUTO: 28.1 % (ref 10–60)
MCH RBC QN AUTO: 29.7 PG (ref 26–34)
MCHC RBC AUTO-ENTMCNC: 33.1 G/DL (ref 31–37)
MCV RBC AUTO: 89.7 FL (ref 80–100)
MONOCYTES # BLD AUTO: 0.35 10*3/MM3 (ref 0–1)
MONOCYTES NFR BLD AUTO: 11.3 % (ref 0–13)
NEUTROPHILS # BLD AUTO: 1.75 10*3/MM3 (ref 1–11)
NEUTROPHILS NFR BLD AUTO: 56.4 % (ref 30–85)
PLATELET # BLD AUTO: 206 10*3/MM3 (ref 150–450)
PMV BLD AUTO: 8.9 FL (ref 6–12)
POTASSIUM BLD-SCNC: 3.8 MMOL/L (ref 3.5–5.2)
PROT SERPL-MCNC: 7.4 G/DL (ref 6–8.5)
RBC # BLD AUTO: 4.08 10*6/MM3 (ref 3.93–5.22)
SODIUM BLD-SCNC: 139 MMOL/L (ref 136–145)
TIBC SERPL-MCNC: 328 MCG/DL
TRIGL SERPL-MCNC: 96 MG/DL (ref 0–150)
TSH SERPL DL<=0.05 MIU/L-ACNC: 2.16 MIU/ML (ref 0.27–4.2)
VIT B12 SERPL-MCNC: 340 PG/ML (ref 211–946)
VLDLC SERPL-MCNC: 19.2 MG/DL (ref 5–40)
WBC NRBC COR # BLD: 3.1 10*3/MM3 (ref 5–10)

## 2018-06-29 PROCEDURE — 85025 COMPLETE CBC W/AUTO DIFF WBC: CPT | Performed by: NURSE PRACTITIONER

## 2018-06-29 PROCEDURE — 80061 LIPID PANEL: CPT | Performed by: NURSE PRACTITIONER

## 2018-06-29 PROCEDURE — 84443 ASSAY THYROID STIM HORMONE: CPT | Performed by: NURSE PRACTITIONER

## 2018-06-29 PROCEDURE — 99214 OFFICE O/P EST MOD 30 MIN: CPT | Performed by: NURSE PRACTITIONER

## 2018-06-29 PROCEDURE — 80053 COMPREHEN METABOLIC PANEL: CPT | Performed by: NURSE PRACTITIONER

## 2018-06-30 LAB — HCV AB S/CO SERPL IA: 0.1 S/CO RATIO (ref 0–0.9)

## 2018-07-01 NOTE — PROGRESS NOTES
Subjective   Machelle Ortega is a 52 y.o. female who presents for f/u regarding anemia, anxiety and chronic neck/back pain.    She has stopped her iron supplement due to GI intolerance.  She is having problems with her battery in her back stimulator staying charged, she will call neurosurgery.  She continues to c/o weakness in lower extremities with frequent falls.  She was recently treated for strep pharyngitis which she states has resolved.  She is followed by psychiatry (Dr. Dennis Steel) for mgmt of ADHD, anxiety/depression and is doing well on current meds.      Back Pain   This is a recurrent problem. The current episode started more than 1 year ago. The problem occurs constantly. The problem has been gradually worsening since onset. The pain is present in the lumbar spine. The pain is moderate. Associated symptoms include a fever. Pertinent negatives include no abdominal pain, chest pain, dysuria, headaches or weakness.   Neck Pain    This is a recurrent problem. The current episode started more than 1 year ago. The problem occurs constantly. The problem has been gradually worsening. The pain is associated with nothing. The quality of the pain is described as stabbing. Associated symptoms include a fever. Pertinent negatives include no chest pain, headaches, trouble swallowing or weakness.        The following portions of the patient's history were reviewed and updated as appropriate: allergies, current medications, past social history and problem list.    Past Medical History:   Diagnosis Date   • Anxiety    • Arthritis    • Asthma Seasonally   • Attention deficit hyperactivity disorder    • Balance problem    • Chronic pain    • Claustrophobia    • DDD (degenerative disc disease), lumbar    • Depression    • Fatigue    • Fibromyalgia    • Fibromyositis    • GERD (gastroesophageal reflux disease)    • History of mononucleosis    • Hyperlipidemia    • IBS (irritable bowel syndrome)    • Iron deficiency anemia     • Lumbago    • Mood disorder    • Sleep apnea     Compliant w/ C-Pap         Current Outpatient Prescriptions:   •  amphetamine-dextroamphetamine (ADDERALL) 20 MG tablet, Take 40 mg by mouth Daily. HOLD PRIOR TO SURG, Disp: , Rfl:   •  buPROPion XL (WELLBUTRIN XL) 300 MG 24 hr tablet, Take 300 mg by mouth Every Evening., Disp: , Rfl:   •  cyclobenzaprine (FLEXERIL) 10 MG tablet, Take 10 mg by mouth 2 (Two) Times a Day As Needed for Muscle Spasms., Disp: , Rfl:   •  FLUoxetine (PROzac) 20 MG capsule, Take 60 mg by mouth Every Night., Disp: , Rfl:   •  gabapentin (NEURONTIN) 400 MG capsule, Take 1 capsule by mouth 3 (Three) Times a Day., Disp: 90 capsule, Rfl: 1  •  lamoTRIgine (LaMICtal) 200 MG tablet, Take 200 mg by mouth Every Evening., Disp: , Rfl: 0  •  metoclopramide (REGLAN) 5 MG tablet, Take 1 tablet by mouth 4 (Four) Times a Day Before Meals & at Bedtime., Disp: 40 tablet, Rfl: 0  •  ondansetron (ZOFRAN) 4 MG tablet, Take 1 tablet by mouth Every 8 (Eight) Hours As Needed for Nausea or Vomiting., Disp: 20 tablet, Rfl: 0  •  pantoprazole (PROTONIX) 40 MG EC tablet, Take 40 mg by mouth Every Evening., Disp: , Rfl:   •  traZODone (DESYREL) 100 MG tablet, Take 200 mg by mouth Every Night., Disp: , Rfl:     Allergies   Allergen Reactions   • Budesonide-Formoterol Fumarate Swelling     Throat   • Oxycodone-Acetaminophen    • Oxycodone-Acetaminophen Hives   • Pregabalin Other (See Comments)     Weight gain       Review of Systems   Constitutional: Positive for fever. Negative for chills, fatigue and unexpected weight change.   HENT: Positive for congestion and postnasal drip. Negative for ear pain, sinus pressure, sore throat and trouble swallowing.    Eyes: Negative for visual disturbance.   Respiratory: Negative for cough, chest tightness, shortness of breath and wheezing.    Cardiovascular: Negative for chest pain, palpitations and leg swelling.   Gastrointestinal: Negative for abdominal pain, blood in stool,  "nausea and vomiting.   Genitourinary: Negative for dysuria, frequency and urgency.   Musculoskeletal: Positive for arthralgias, back pain, neck pain and neck stiffness. Negative for joint swelling.   Skin: Negative for color change.   Neurological: Negative for syncope, weakness and headaches.   Hematological: Does not bruise/bleed easily.   Psychiatric/Behavioral: Positive for dysphoric mood. The patient is nervous/anxious.        Objective   Vitals:    06/29/18 1113   BP: 118/72   BP Location: Left arm   Patient Position: Sitting   Cuff Size: Adult   Pulse: 78   SpO2: 97%   Weight: 84.4 kg (186 lb)   Height: 163.8 cm (64.5\")     Physical Exam   Constitutional: She appears well-developed and well-nourished. She is cooperative. She does not have a sickly appearance. She does not appear ill.   HENT:   Head: Normocephalic.   Right Ear: Hearing, tympanic membrane and external ear normal.   Left Ear: Hearing, tympanic membrane and external ear normal.   Nose: Nose normal. No mucosal edema, rhinorrhea, sinus tenderness or nasal deformity. Right sinus exhibits no maxillary sinus tenderness and no frontal sinus tenderness. Left sinus exhibits no maxillary sinus tenderness and no frontal sinus tenderness.   Mouth/Throat: Mucous membranes are normal. Normal dentition. Posterior oropharyngeal erythema present.   Eyes: Conjunctivae and lids are normal. Right eye exhibits no discharge and no exudate. Left eye exhibits no discharge and no exudate.   Neck: Trachea normal and normal range of motion. Carotid bruit is not present. No edema present. No thyroid mass and no thyromegaly present.   Cardiovascular: Regular rhythm, normal heart sounds and normal pulses.    No murmur heard.  Pulmonary/Chest: Breath sounds normal. No respiratory distress. She has no decreased breath sounds. She has no wheezes. She has no rhonchi. She has no rales.   Lymphadenopathy:        Head (right side): No submental, no submandibular, no tonsillar, no " preauricular, no posterior auricular and no occipital adenopathy present.        Head (left side): No submental, no submandibular, no tonsillar, no preauricular, no posterior auricular and no occipital adenopathy present.   Neurological: She is alert.   Skin: Skin is warm, dry and intact. No cyanosis. Nails show no clubbing.       Assessment/Plan   Machelle was seen today for follow-up.    Diagnoses and all orders for this visit:    Iron deficiency anemia, unspecified iron deficiency anemia type  Comments:  not currently on iron suppl, recheck levels today  Orders:  -     Ferritin; Future  -     CBC Auto Differential; Future  -     Vitamin B12 & Folate; Future  -     Iron Profile; Future  -     Ferritin  -     CBC Auto Differential  -     Vitamin B12 & Folate  -     Iron Profile    Hyperlipidemia, unspecified hyperlipidemia type  Comments:  no meds, recheck fasting labs  Orders:  -     Comprehensive Metabolic Panel; Future  -     Lipid Panel; Future  -     TSH; Future  -     Comprehensive Metabolic Panel  -     Lipid Panel  -     TSH    Gastroesophageal reflux disease, esophagitis presence not specified  Comments:  managed on Protonix    Lumbar radiculopathy  Comments:  followed by neurosurgery    Neuropathic pain    Mood disorder  Comments:  doing well on current meds    Screening for viral disease  -     Hepatitis C Antibody; Future  -     Hepatitis C Antibody    She is requesting Dr. Powell write her psychiatric medication due to change in psychiatrist's practice (cost has increased with new practice), discussed with Dr. Powell who would like to review her diagnosis re: ADHD before deciding on medications. Notified patient who will obtain copy of records and bring to office.

## 2018-10-03 ENCOUNTER — PRIOR AUTHORIZATION (OUTPATIENT)
Dept: INTERNAL MEDICINE | Facility: CLINIC | Age: 53
End: 2018-10-03

## 2018-10-03 ENCOUNTER — OFFICE VISIT (OUTPATIENT)
Dept: INTERNAL MEDICINE | Facility: CLINIC | Age: 53
End: 2018-10-03

## 2018-10-03 VITALS
DIASTOLIC BLOOD PRESSURE: 76 MMHG | RESPIRATION RATE: 16 BRPM | WEIGHT: 190 LBS | SYSTOLIC BLOOD PRESSURE: 118 MMHG | OXYGEN SATURATION: 96 % | BODY MASS INDEX: 31.65 KG/M2 | HEIGHT: 65 IN | HEART RATE: 70 BPM

## 2018-10-03 DIAGNOSIS — M79.7 FIBROMYALGIA: ICD-10-CM

## 2018-10-03 DIAGNOSIS — E78.5 HYPERLIPIDEMIA, UNSPECIFIED HYPERLIPIDEMIA TYPE: Primary | ICD-10-CM

## 2018-10-03 DIAGNOSIS — Z00.00 WELLNESS EXAMINATION: ICD-10-CM

## 2018-10-03 DIAGNOSIS — G47.33 OBSTRUCTIVE SLEEP APNEA SYNDROME: ICD-10-CM

## 2018-10-03 DIAGNOSIS — F51.01 PRIMARY INSOMNIA: ICD-10-CM

## 2018-10-03 DIAGNOSIS — F98.8 ADD (ATTENTION DEFICIT DISORDER) WITHOUT HYPERACTIVITY: ICD-10-CM

## 2018-10-03 DIAGNOSIS — Z23 NEED FOR IMMUNIZATION AGAINST INFLUENZA: ICD-10-CM

## 2018-10-03 DIAGNOSIS — M54.5 LOW BACK PAIN, UNSPECIFIED BACK PAIN LATERALITY, UNSPECIFIED CHRONICITY, WITH SCIATICA PRESENCE UNSPECIFIED: ICD-10-CM

## 2018-10-03 DIAGNOSIS — R26.89 BALANCE PROBLEM: ICD-10-CM

## 2018-10-03 DIAGNOSIS — F33.9 MONOPOLAR DEPRESSION (HCC): ICD-10-CM

## 2018-10-03 DIAGNOSIS — R06.02 BREATH SHORTNESS: ICD-10-CM

## 2018-10-03 PROCEDURE — 90674 CCIIV4 VAC NO PRSV 0.5 ML IM: CPT | Performed by: INTERNAL MEDICINE

## 2018-10-03 PROCEDURE — G0008 ADMIN INFLUENZA VIRUS VAC: HCPCS | Performed by: INTERNAL MEDICINE

## 2018-10-03 PROCEDURE — 99214 OFFICE O/P EST MOD 30 MIN: CPT | Performed by: INTERNAL MEDICINE

## 2018-10-03 PROCEDURE — G0439 PPPS, SUBSEQ VISIT: HCPCS | Performed by: INTERNAL MEDICINE

## 2018-10-03 RX ORDER — PANTOPRAZOLE SODIUM 40 MG/1
40 TABLET, DELAYED RELEASE ORAL EVERY EVENING
Qty: 90 TABLET | Refills: 2 | Status: SHIPPED | OUTPATIENT
Start: 2018-10-03 | End: 2020-02-07 | Stop reason: SDUPTHER

## 2018-10-03 RX ORDER — LAMOTRIGINE 200 MG/1
200 TABLET ORAL EVERY EVENING
Qty: 90 TABLET | Refills: 2 | Status: SHIPPED | OUTPATIENT
Start: 2018-10-03 | End: 2019-07-18 | Stop reason: SDUPTHER

## 2018-10-03 RX ORDER — DEXTROAMPHETAMINE SACCHARATE, AMPHETAMINE ASPARTATE, DEXTROAMPHETAMINE SULFATE AND AMPHETAMINE SULFATE 5; 5; 5; 5 MG/1; MG/1; MG/1; MG/1
20 TABLET ORAL 2 TIMES DAILY
Qty: 60 TABLET | Refills: 0 | Status: ON HOLD | OUTPATIENT
Start: 2018-10-03 | End: 2020-01-16

## 2018-10-03 RX ORDER — CYCLOBENZAPRINE HCL 10 MG
10 TABLET ORAL 2 TIMES DAILY PRN
Qty: 90 TABLET | Refills: 3 | Status: SHIPPED | OUTPATIENT
Start: 2018-10-03 | End: 2023-03-14

## 2018-10-03 RX ORDER — BUPROPION HYDROCHLORIDE 300 MG/1
300 TABLET ORAL EVERY EVENING
Qty: 90 TABLET | Refills: 2 | Status: SHIPPED | OUTPATIENT
Start: 2018-10-03 | End: 2019-02-25 | Stop reason: SDUPTHER

## 2018-10-03 RX ORDER — FLUOXETINE HYDROCHLORIDE 20 MG/1
60 CAPSULE ORAL NIGHTLY
Qty: 270 CAPSULE | Refills: 2 | Status: SHIPPED | OUTPATIENT
Start: 2018-10-03 | End: 2019-10-30 | Stop reason: SDUPTHER

## 2018-10-03 RX ORDER — TRAZODONE HYDROCHLORIDE 100 MG/1
TABLET ORAL
Qty: 270 TABLET | Refills: 2 | Status: SHIPPED | OUTPATIENT
Start: 2018-10-03 | End: 2019-07-18 | Stop reason: SDUPTHER

## 2018-10-03 NOTE — PATIENT INSTRUCTIONS
Medicare Wellness  Personal Prevention Plan of Service     Date of Office Visit:  10/03/2018  Encounter Provider:  Iqra Powell MD  Place of Service:  [facility]  Patient Name: Machelle Ortega  :  1965    As part of the Medicare Wellness portion of your visit today, we are providing you with this personalized preventive plan of services (PPPS). This plan is based upon recommendations of the [place] Preventive Services Task Force (USPSTF) and the Advisory Committee on Immunization Practices (ACIP).    This lists the preventive care services that should be considered, and provides dates of when you are due. Items listed as completed are up-to-date and do not require any further intervention.    Health Maintenance   Topic Date Due   • ZOSTER VACCINE (2 of 2) 2018   • MAMMOGRAM  2018   • PAP SMEAR  2019   • LIPID PANEL  2019   • MEDICARE ANNUAL WELLNESS  10/03/2019   • TDAP/TD VACCINES (2 - Td) 2024   • COLONOSCOPY  2027   • HEPATITIS C SCREENING  Completed   • INFLUENZA VACCINE  Completed       No orders of the defined types were placed in this encounter.      No Follow-up on file.

## 2018-10-03 NOTE — PROGRESS NOTES
QUICK REFERENCE INFORMATION:  The ABCs of the Annual Wellness Visit    Subsequent Medicare Wellness Visit    HEALTH RISK ASSESSMENT    1965    Recent Hospitalizations:  No hospitalization(s) within the last year..        Current Medical Providers:  Patient Care Team:  Iqra Powell MD as PCP - General (Internal Medicine)  Nicolette Pizano APRN as PCP - Claims Attributed        Smoking Status:  History   Smoking Status   • Never Smoker   Smokeless Tobacco   • Never Used       Alcohol Consumption:  History   Alcohol Use   • Yes     Comment: Socially on holidays       Depression Screen:   PHQ-2/PHQ-9 Depression Screening 10/3/2018   Little interest or pleasure in doing things 3   Feeling down, depressed, or hopeless 3   Trouble falling or staying asleep, or sleeping too much 3   Feeling tired or having little energy 3   Poor appetite or overeating 0   Feeling bad about yourself - or that you are a failure or have let yourself or your family down 3   Trouble concentrating on things, such as reading the newspaper or watching television 3   Moving or speaking so slowly that other people could have noticed. Or the opposite - being so fidgety or restless that you have been moving around a lot more than usual 0   Thoughts that you would be better off dead, or of hurting yourself in some way 0   Total Score 18   If you checked off any problems, how difficult have these problems made it for you to do your work, take care of things at home, or get along with other people? Extremely dIfficult       Health Habits and Functional and Cognitive Screening:  Functional & Cognitive Status 10/3/2018   Do you have difficulty preparing food and eating? No   Do you have difficulty bathing yourself, getting dressed or grooming yourself? No   Do you have difficulty using the toilet? No   Do you have difficulty moving around from place to place? Yes   Do you have trouble with steps or getting out of a bed or a chair? Yes   In  the past year have you fallen or experienced a near fall? Yes   Current Diet Well Balanced Diet   Dental Exam Up to date   Eye Exam Up to date   Exercise (times per week) 5 times per week   Current Exercise Activities Include Walking   Do you need help using the phone?  No   Are you deaf or do you have serious difficulty hearing?  No   Do you need help with transportation? Yes   Do you need help shopping? No   Do you need help preparing meals?  Yes   Do you need help with housework?  Yes   Do you need help with laundry? Yes   Do you need help taking your medications? Yes   Do you need help managing money? No   Do you ever drive or ride in a car without wearing a seat belt? No   Have you felt unusual stress, anger or loneliness in the last month? Yes   Who do you live with? Spouse   If you need help, do you have trouble finding someone available to you? No   Do you have difficulty concentrating, remembering or making decisions? Yes           Does the patient have evidence of cognitive impairment? No    Aspirin use counseling: Does not need ASA (and currently is not on it)      Recent Lab Results:  CMP:  Lab Results   Component Value Date    BUN 18 06/29/2018    CREATININE 0.75 06/29/2018    EGFRIFNONA 81 06/29/2018    BCR 24.0 06/29/2018     06/29/2018    K 3.8 06/29/2018    CO2 26.7 06/29/2018    CALCIUM 9.6 06/29/2018    ALBUMIN 4.80 06/29/2018    BILITOT 0.2 06/29/2018    ALKPHOS 97 06/29/2018    AST 11 06/29/2018    ALT 9 06/29/2018     Lipid Panel:  Lab Results   Component Value Date    CHOL 199 06/29/2018    TRIG 96 06/29/2018    HDL 59 06/29/2018    VLDL 19.2 06/29/2018    LDLHDL 2.05 06/29/2018     HbA1c:       Visual Acuity:  No exam data present    Age-appropriate Screening Schedule:  Refer to the list below for future screening recommendations based on patient's age, sex and/or medical conditions. Orders for these recommended tests are listed in the plan section. The patient has been provided with a  written plan.    Health Maintenance   Topic Date Due   • ZOSTER VACCINE (2 of 2) 07/24/2018   • INFLUENZA VACCINE  08/01/2018   • MAMMOGRAM  12/01/2018   • PAP SMEAR  01/01/2019   • LIPID PANEL  06/29/2019   • TDAP/TD VACCINES (2 - Td) 02/06/2024   • COLONOSCOPY  03/22/2027        Subjective   History of Present Illness    Machelle Ortega is a 52 y.o. female who presents for an Subsequent Wellness Visit.    The following portions of the patient's history were reviewed and updated as appropriate: allergies, current medications, past family history, past medical history, past social history, past surgical history and problem list.    Outpatient Medications Prior to Visit   Medication Sig Dispense Refill   • amphetamine-dextroamphetamine (ADDERALL) 20 MG tablet Take 40 mg by mouth Daily. HOLD PRIOR TO SURG     • buPROPion XL (WELLBUTRIN XL) 300 MG 24 hr tablet Take 300 mg by mouth Every Evening.     • cyclobenzaprine (FLEXERIL) 10 MG tablet Take 10 mg by mouth 2 (Two) Times a Day As Needed for Muscle Spasms.     • FLUoxetine (PROzac) 20 MG capsule Take 60 mg by mouth Every Night.     • gabapentin (NEURONTIN) 400 MG capsule Take 1 capsule by mouth 3 (Three) Times a Day. 90 capsule 1   • lamoTRIgine (LaMICtal) 200 MG tablet Take 200 mg by mouth Every Evening.  0   • metoclopramide (REGLAN) 5 MG tablet Take 1 tablet by mouth 4 (Four) Times a Day Before Meals & at Bedtime. 40 tablet 0   • ondansetron (ZOFRAN) 4 MG tablet Take 1 tablet by mouth Every 8 (Eight) Hours As Needed for Nausea or Vomiting. 20 tablet 0   • pantoprazole (PROTONIX) 40 MG EC tablet Take 40 mg by mouth Every Evening.     • traZODone (DESYREL) 100 MG tablet Take 200 mg by mouth Every Night.       No facility-administered medications prior to visit.        Patient Active Problem List   Diagnosis   • Abdominal pain   • Ataxic gait   • Cervical pain   • Chest pain, pleuritic   • DDD (degenerative disc disease), cervical   • DDD (degenerative disc disease),  "lumbar   • Dry cough   • Extremity pain   • Low back pain   • Chronic nausea   • Loss of feeling or sensation   • Pain in thoracic spine   • Pleural cavity effusion   • Breath shortness   • Cervical spinal stenosis   • IBS (irritable bowel syndrome)   • GERD (gastroesophageal reflux disease)   • Multiple joint complaints   • Attention deficit hyperactivity disorder   • Fibromyalgia   • Mood disorder (CMS/HCC)   • Fatigue   • Sleep apnea   • Hyperlipidemia   • History of benign schwannoma   • Other cervical disc degeneration, high cervical region   • Chronic pain   • Postlaminectomy syndrome, thoracic   • Diaphragmatic hernia without obstruction and without gangrene   • Lumbar radiculopathy   • History of spinal surgery   • Neuropathic pain       Advance Care Planning:  has an advance directive - a copy HAS NOT been provided. Have asked the patient to send this to us to add to record.    Identification of Risk Factors:  Risk factors include: weight , inactivity, increased fall risk, chronic pain and incontinence.    Review of Systems    Compared to one year ago, the patient feels her physical health is worse.  Compared to one year ago, the patient feels her mental health is worse.    Objective     Physical Exam    Vitals:    10/03/18 0831   BP: 118/76   BP Location: Left arm   Patient Position: Sitting   Cuff Size: Adult   Pulse: 70   SpO2: 96%   Weight: 86.2 kg (190 lb)   Height: 163.8 cm (64.5\")       Patient's Body mass index is 32.11 kg/m². BMI is above normal parameters. Recommendations include: exercise counseling, no follow-up required and nutrition counseling.      Assessment/Plan   Patient Self-Management and Personalized Health Advice  The patient has been provided with information about: diet, exercise, weight management, prevention of cardiac or vascular disease, the relationship between weight and GERD, fall prevention, designing advance directives and mental health concerns and preventive services " including:   · Advance directive, Bone densitometry screening, Diabetes screening, see lab orders, Exercise counseling provided, Fall Risk assessment done, Fall Risk plan of care done, Glaucoma screening recommended, Influenza vaccine, Nutrition counseling provided, Pneumococcal vaccine , Screening for AAA, referral for ultrasound placed, Urinary Incontinence assessment done, Zostavax vaccine (Herpes Zoster).    Visit Diagnoses:  No diagnosis found.    No orders of the defined types were placed in this encounter.      Outpatient Encounter Prescriptions as of 10/3/2018   Medication Sig Dispense Refill   • amphetamine-dextroamphetamine (ADDERALL) 20 MG tablet Take 40 mg by mouth Daily. HOLD PRIOR TO SURG     • buPROPion XL (WELLBUTRIN XL) 300 MG 24 hr tablet Take 300 mg by mouth Every Evening.     • cyclobenzaprine (FLEXERIL) 10 MG tablet Take 10 mg by mouth 2 (Two) Times a Day As Needed for Muscle Spasms.     • FLUoxetine (PROzac) 20 MG capsule Take 60 mg by mouth Every Night.     • gabapentin (NEURONTIN) 400 MG capsule Take 1 capsule by mouth 3 (Three) Times a Day. 90 capsule 1   • lamoTRIgine (LaMICtal) 200 MG tablet Take 200 mg by mouth Every Evening.  0   • metoclopramide (REGLAN) 5 MG tablet Take 1 tablet by mouth 4 (Four) Times a Day Before Meals & at Bedtime. 40 tablet 0   • ondansetron (ZOFRAN) 4 MG tablet Take 1 tablet by mouth Every 8 (Eight) Hours As Needed for Nausea or Vomiting. 20 tablet 0   • pantoprazole (PROTONIX) 40 MG EC tablet Take 40 mg by mouth Every Evening.     • traZODone (DESYREL) 100 MG tablet Take 200 mg by mouth Every Night.       No facility-administered encounter medications on file as of 10/3/2018.        Reviewed use of high risk medication in the elderly: yes  Reviewed for potential of harmful drug interactions in the elderly: yes    Follow Up:  No Follow-up on file.     An After Visit Summary and PPPS with all of these plans were given to the patient.        She uses rare hydrocoe

## 2018-10-03 NOTE — PROGRESS NOTES
"Subjective   Machelle Ortega is a 52 y.o. female here for   Chief Complaint   Patient presents with   • Subsequent Wellness Visit   • Shoulder Pain   • Back Pain   .    Vitals:    10/03/18 0831   BP: 118/76   BP Location: Left arm   Patient Position: Sitting   Cuff Size: Adult   Pulse: 70   Resp: 16   SpO2: 96%   Weight: 86.2 kg (190 lb)   Height: 163.8 cm (64.5\")       Body mass index is 32.11 kg/m².    Back Pain   This is a chronic problem. The current episode started more than 1 year ago. The problem occurs constantly. The problem is unchanged. The pain is moderate. Associated symptoms include headaches, numbness (decreased sensation everywhere) and weakness (all over). Pertinent negatives include no abdominal pain, chest pain, dysuria or fever.        The following portions of the patient's history were reviewed and updated as appropriate: allergies, current medications, past social history and problem list.    Review of Systems   Constitutional: Positive for fatigue. Negative for appetite change, chills, fever and unexpected weight change.   HENT: Positive for congestion, postnasal drip and voice change. Negative for ear pain, mouth sores, sore throat, tinnitus and trouble swallowing.    Eyes: Negative for pain and visual disturbance.   Respiratory: Positive for shortness of breath. Negative for cough, choking and wheezing.    Cardiovascular: Negative for chest pain, palpitations and leg swelling.   Gastrointestinal: Positive for constipation and nausea (occ). Negative for abdominal pain, blood in stool, diarrhea and vomiting.   Endocrine: Positive for cold intolerance and heat intolerance. Negative for polydipsia and polyuria.   Genitourinary: Positive for enuresis (2-3x) and urgency. Negative for difficulty urinating, dysuria, flank pain, frequency and hematuria.   Musculoskeletal: Positive for arthralgias (all over), back pain, myalgias, neck pain and neck stiffness. Negative for gait problem and joint " swelling.   Skin: Negative for color change and rash.   Allergic/Immunologic: Positive for environmental allergies. Negative for food allergies and immunocompromised state.   Neurological: Positive for weakness (all over), light-headedness, numbness (decreased sensation everywhere) and headaches. Negative for tremors, seizures, syncope and speech difficulty.   Hematological: Negative for adenopathy. Does not bruise/bleed easily.   Psychiatric/Behavioral: Positive for dysphoric mood and sleep disturbance. Negative for agitation, confusion, decreased concentration and suicidal ideas. The patient is nervous/anxious.        Objective   Physical Exam   Constitutional: She appears well-developed and well-nourished.   HENT:   Right Ear: Hearing, tympanic membrane, external ear and ear canal normal.   Left Ear: Hearing, tympanic membrane, external ear and ear canal normal.   Nose: Right sinus exhibits no maxillary sinus tenderness and no frontal sinus tenderness. Left sinus exhibits no maxillary sinus tenderness and no frontal sinus tenderness.   Eyes: Pupils are equal, round, and reactive to light. Conjunctivae, EOM and lids are normal.   Neck: Trachea normal. Neck supple. No JVD present. Carotid bruit is not present. No tracheal deviation present. No thyroid mass and no thyromegaly present.   Cardiovascular: Normal rate, regular rhythm, S1 normal and S2 normal.  Exam reveals no gallop and no friction rub.    No murmur heard.  Pulses:       Carotid pulses are 2+ on the right side, and 2+ on the left side.       Radial pulses are 2+ on the right side, and 2+ on the left side.        Dorsalis pedis pulses are 2+ on the right side, and 2+ on the left side.        Posterior tibial pulses are 2+ on the right side, and 2+ on the left side.   Pulmonary/Chest: Effort normal and breath sounds normal. Chest wall is not dull to percussion. Right breast exhibits no inverted nipple, no mass, no nipple discharge, no skin change and no  tenderness. Left breast exhibits no inverted nipple, no mass, no nipple discharge, no skin change and no tenderness.   Abdominal: Soft. Normal aorta and bowel sounds are normal. She exhibits no abdominal bruit and no mass. There is no hepatosplenomegaly. There is no tenderness. There is no rebound and no guarding. No hernia.   Musculoskeletal: She exhibits tenderness. She exhibits no edema.   Lymphadenopathy:     She has no cervical adenopathy.     She has no axillary adenopathy.        Right: No supraclavicular adenopathy present.        Left: No supraclavicular adenopathy present.   Neurological: She is alert. She has normal strength. No cranial nerve deficit. She displays a negative Romberg sign.   Reflex Scores:       Patellar reflexes are 2+ on the right side and 2+ on the left side.  Skin: Skin is warm and dry.   Nursing note and vitals reviewed.    Decreased ROM (in all directions) of low & mid back b/c pain.  Decreased tandem gait.    Assessment/Plan   Diagnoses and all orders for this visit:    Hyperlipidemia, unspecified hyperlipidemia type  Comments:  need healthy diet & daily exercise    Obstructive sleep apnea syndrome  Comments:  continue nightly cpap    Breath shortness  Comments:  x yrs - no change - need slowly incr exercise    Fibromyalgia  Comments:  need slow incr in exercise    Low back pain, unspecified back pain laterality, unspecified chronicity, with sciatica presence unspecified  Comments:  x yrs - f/u with back specialist & pain management    Monopolar depression (CMS/HCC)  Comments:  improved with medication, but not 100% - she will f/u with psychiatry    Balance problem  Comments:  need daily balance exercise    Wellness examination    ADD (attention deficit disorder) without hyperactivity  Comments:  stable with adderall (& no side effects)  Orders:  -     amphetamine-dextroamphetamine (ADDERALL) 20 MG tablet; Take 1 tablet by mouth 2 (Two) Times a Day. HOLD PRIOR TO SURG    Need for  immunization against influenza  -     Flucelvax Quad=>4Years ([id number])    Primary insomnia  Comments:  ok with trazodone    Other orders  -     buPROPion XL (WELLBUTRIN XL) 300 MG 24 hr tablet; Take 1 tablet by mouth Every Evening.  -     FLUoxetine (PROzac) 20 MG capsule; Take 3 capsules by mouth Every Night.  -     cyclobenzaprine (FLEXERIL) 10 MG tablet; Take 1 tablet by mouth 2 (Two) Times a Day As Needed for Muscle Spasms.  -     lamoTRIgine (LaMICtal) 200 MG tablet; Take 1 tablet by mouth Every Evening.  -     traZODone (DESYREL) 100 MG tablet; 2-3 nightly  -     pantoprazole (PROTONIX) 40 MG EC tablet; Take 1 tablet by mouth Every Evening.

## 2018-10-04 NOTE — TELEPHONE ENCOUNTER
Prior authorization was denied by patients insurance she to be tested and diagnosed as having ADHD or ADD.     Please Advise    Thank you

## 2018-10-05 NOTE — TELEPHONE ENCOUNTER
Patient was informed of the denial.     She stated she is unable to afford seeing a psychiatrist which is why she asked Dr. Powell to prescribe this medication for her.     Mrs. Ortega stated she has commercial [employer] under her . However she would contact her insurance.

## 2019-01-03 ENCOUNTER — OFFICE VISIT (OUTPATIENT)
Dept: INTERNAL MEDICINE | Facility: CLINIC | Age: 54
End: 2019-01-03

## 2019-01-03 VITALS
OXYGEN SATURATION: 99 % | HEIGHT: 65 IN | SYSTOLIC BLOOD PRESSURE: 118 MMHG | WEIGHT: 185 LBS | TEMPERATURE: 98.4 F | DIASTOLIC BLOOD PRESSURE: 74 MMHG | BODY MASS INDEX: 30.82 KG/M2 | HEART RATE: 76 BPM

## 2019-01-03 DIAGNOSIS — J20.9 ACUTE BRONCHITIS, UNSPECIFIED ORGANISM: Primary | ICD-10-CM

## 2019-01-03 PROCEDURE — 99213 OFFICE O/P EST LOW 20 MIN: CPT | Performed by: NURSE PRACTITIONER

## 2019-01-03 RX ORDER — ALBUTEROL SULFATE 90 UG/1
2 AEROSOL, METERED RESPIRATORY (INHALATION) EVERY 4 HOURS PRN
Qty: 6.7 G | Refills: 1 | Status: SHIPPED | OUTPATIENT
Start: 2019-01-03 | End: 2023-03-14 | Stop reason: SDUPTHER

## 2019-01-03 RX ORDER — CEPHALEXIN 500 MG/1
500 TABLET ORAL 3 TIMES DAILY
Qty: 21 TABLET | Refills: 0 | Status: SHIPPED | OUTPATIENT
Start: 2019-01-03 | End: 2019-01-10

## 2019-01-03 NOTE — PROGRESS NOTES
Subjective   Machelle Ortega is a 53 y.o. female who presents due to respiratory symptoms.    URI    This is a new problem. The current episode started 1 to 4 weeks ago (sx began 12/24). The problem has been gradually worsening. There has been no fever (c/o felt feverish last night). Associated symptoms include congestion, coughing, ear pain, headaches, rhinorrhea, sinus pain, sneezing, a sore throat and wheezing (at night). Pertinent negatives include no abdominal pain, chest pain, diarrhea, nausea, neck pain or vomiting. She has tried nothing for the symptoms.        The following portions of the patient's history were reviewed and updated as appropriate: allergies, current medications, past social history and problem list.    Past Medical History:   Diagnosis Date   • Anxiety    • Arthritis    • Asthma Seasonally   • Attention deficit hyperactivity disorder    • Balance problem    • Chronic pain    • Claustrophobia    • DDD (degenerative disc disease), lumbar    • Depression    • Fatigue    • Fibromyalgia    • Fibromyositis    • GERD (gastroesophageal reflux disease)    • History of mononucleosis    • Hyperlipidemia    • IBS (irritable bowel syndrome)    • Iron deficiency anemia    • Liver anomaly, congenital     ectopic liver   • Lumbago    • Mood disorder (CMS/HCC)    • Sleep apnea     Compliant w/ C-Pap         Current Outpatient Medications:   •  amphetamine-dextroamphetamine (ADDERALL) 20 MG tablet, Take 1 tablet by mouth 2 (Two) Times a Day. HOLD PRIOR TO SURG, Disp: 60 tablet, Rfl: 0  •  buPROPion XL (WELLBUTRIN XL) 300 MG 24 hr tablet, Take 1 tablet by mouth Every Evening., Disp: 90 tablet, Rfl: 2  •  cyclobenzaprine (FLEXERIL) 10 MG tablet, Take 1 tablet by mouth 2 (Two) Times a Day As Needed for Muscle Spasms., Disp: 90 tablet, Rfl: 3  •  FLUoxetine (PROzac) 20 MG capsule, Take 3 capsules by mouth Every Night., Disp: 270 capsule, Rfl: 2  •  gabapentin (NEURONTIN) 400 MG capsule, Take 1 capsule by mouth 3  "(Three) Times a Day., Disp: 90 capsule, Rfl: 1  •  lamoTRIgine (LaMICtal) 200 MG tablet, Take 1 tablet by mouth Every Evening., Disp: 90 tablet, Rfl: 2  •  pantoprazole (PROTONIX) 40 MG EC tablet, Take 1 tablet by mouth Every Evening., Disp: 90 tablet, Rfl: 2  •  traZODone (DESYREL) 100 MG tablet, 2-3 nightly, Disp: 270 tablet, Rfl: 2  •  albuterol sulfate  (90 Base) MCG/ACT inhaler, Inhale 2 puffs Every 4 (Four) Hours As Needed for Wheezing., Disp: 6.7 g, Rfl: 1  •  Cephalexin 500 MG tablet, Take 500 mg by mouth 3 (Three) Times a Day for 7 days., Disp: 21 tablet, Rfl: 0    Allergies   Allergen Reactions   • Budesonide-Formoterol Fumarate Swelling     Throat   • Oxycodone-Acetaminophen Hives   • Pregabalin Other (See Comments)     Weight gain       Review of Systems   Constitutional: Negative for appetite change, chills, fatigue and fever.   HENT: Positive for congestion, ear pain, postnasal drip, rhinorrhea, sinus pressure, sinus pain, sneezing and sore throat. Negative for ear discharge, facial swelling and tinnitus.    Respiratory: Positive for cough and wheezing (at night). Negative for chest tightness and shortness of breath.    Cardiovascular: Negative for chest pain, palpitations and leg swelling.   Gastrointestinal: Negative for abdominal pain, diarrhea, nausea and vomiting.   Musculoskeletal: Negative for neck pain and neck stiffness.   Neurological: Positive for headaches.   Hematological: Negative for adenopathy.       Objective   Vitals:    01/03/19 1551   BP: 118/74   BP Location: Left arm   Patient Position: Sitting   Cuff Size: Adult   Pulse: 76   Temp: 98.4 °F (36.9 °C)   TempSrc: Oral   SpO2: 99%   Weight: 83.9 kg (185 lb)   Height: 163.8 cm (64.5\")     Physical Exam   Constitutional: She appears well-developed and well-nourished. She is cooperative. She does not have a sickly appearance. She does not appear ill.   HENT:   Head: Normocephalic.   Right Ear: Hearing and external ear normal. No " drainage, swelling or tenderness. Tympanic membrane is bulging. Tympanic membrane is not erythematous. No middle ear effusion. No decreased hearing is noted.   Left Ear: Hearing and external ear normal. No drainage, swelling or tenderness. Tympanic membrane is bulging. Tympanic membrane is not erythematous.  No middle ear effusion. No decreased hearing is noted.   Nose: Nose normal. No mucosal edema, rhinorrhea, sinus tenderness or nasal deformity. Right sinus exhibits no maxillary sinus tenderness and no frontal sinus tenderness. Left sinus exhibits no maxillary sinus tenderness and no frontal sinus tenderness.   Mouth/Throat: Mucous membranes are normal. Posterior oropharyngeal erythema present.   Eyes: Conjunctivae and lids are normal.   Neck: Trachea normal.   Cardiovascular: Regular rhythm, normal heart sounds and normal pulses.   No murmur heard.  Pulmonary/Chest: Breath sounds normal. No respiratory distress. She has no decreased breath sounds. She has no wheezes. She has no rhonchi. She has no rales.   Lymphadenopathy:     She has no cervical adenopathy.   Neurological: She is alert.   Skin: Skin is warm, dry and intact.   Nursing note and vitals reviewed.      Assessment/Plan   Machelle was seen today for uri.    Diagnoses and all orders for this visit:    Acute bronchitis, unspecified organism  -     Cephalexin 500 MG tablet; Take 500 mg by mouth 3 (Three) Times a Day for 7 days.  -     albuterol sulfate  (90 Base) MCG/ACT inhaler; Inhale 2 puffs Every 4 (Four) Hours As Needed for Wheezing.    She will continue Mucinex for increased congestion and drainage, add Albuterol as needed for chest tightness and intermittent wheezing.

## 2019-02-25 RX ORDER — BUPROPION HYDROCHLORIDE 300 MG/1
300 TABLET ORAL EVERY EVENING
Qty: 90 TABLET | Refills: 2 | Status: ON HOLD | OUTPATIENT
Start: 2019-02-25 | End: 2020-01-16

## 2019-04-16 NOTE — ANESTHESIA POSTPROCEDURE EVALUATION
Patient: Machelle Ortega    Procedure Summary     Date Anesthesia Start Anesthesia Stop Room / Location    03/22/17 1358 1421  [facility]       Procedure Diagnosis Surgeon Provider    ESOPHAGOGASTRODUODENOSCOPY WITH BX  (N/A Esophagus); COLONOSCOPY TO CECUM  (N/A ) Chronic nausea; Chronic idiopathic constipation; Gastroesophageal reflux disease, esophagitis presence not specified  (Chronic nausea [R11.0]; Chronic idiopathic constipation [K59.04]; Gastroesophageal reflux disease, esophagitis presence not specified [K21.9]) MD Neftali Mendiola MD          Anesthesia Type: MAC  Last vitals  /59 (03/22/17 1430)    Temp      Pulse 52 (03/22/17 1430)   Resp 16 (03/22/17 1430)    SpO2 98 % (03/22/17 1430)      Post Anesthesia Care and Evaluation    Patient location during evaluation: PACU  Patient participation: complete - patient participated  Level of consciousness: awake and alert  Pain management: adequate  Airway patency: patent  Anesthetic complications: No anesthetic complications    Cardiovascular status: acceptable  Respiratory status: acceptable  Hydration status: acceptable       decreased strength

## 2019-07-18 RX ORDER — TRAZODONE HYDROCHLORIDE 100 MG/1
TABLET ORAL
Qty: 270 TABLET | Refills: 2 | Status: SHIPPED | OUTPATIENT
Start: 2019-07-18 | End: 2020-02-07 | Stop reason: SDUPTHER

## 2019-07-18 RX ORDER — LAMOTRIGINE 200 MG/1
TABLET ORAL
Qty: 90 TABLET | Refills: 2 | Status: SHIPPED | OUTPATIENT
Start: 2019-07-18 | End: 2020-01-15

## 2019-10-30 RX ORDER — FLUOXETINE HYDROCHLORIDE 20 MG/1
60 CAPSULE ORAL NIGHTLY
Qty: 270 CAPSULE | Refills: 2 | Status: SHIPPED | OUTPATIENT
Start: 2019-10-30 | End: 2019-11-12 | Stop reason: SDUPTHER

## 2019-11-13 RX ORDER — FLUOXETINE HYDROCHLORIDE 20 MG/1
60 CAPSULE ORAL NIGHTLY
Qty: 270 CAPSULE | Refills: 2 | Status: SHIPPED | OUTPATIENT
Start: 2019-11-13 | End: 2020-02-07 | Stop reason: SDUPTHER

## 2020-01-15 ENCOUNTER — APPOINTMENT (OUTPATIENT)
Dept: CT IMAGING | Facility: HOSPITAL | Age: 55
End: 2020-01-15

## 2020-01-15 ENCOUNTER — HOSPITAL ENCOUNTER (INPATIENT)
Facility: HOSPITAL | Age: 55
LOS: 3 days | Discharge: HOME | End: 2020-01-20
Attending: EMERGENCY MEDICINE | Admitting: INTERNAL MEDICINE

## 2020-01-15 ENCOUNTER — APPOINTMENT (OUTPATIENT)
Dept: GENERAL RADIOLOGY | Facility: HOSPITAL | Age: 55
End: 2020-01-15

## 2020-01-15 DIAGNOSIS — J18.9 MULTIFOCAL PNEUMONIA: Primary | ICD-10-CM

## 2020-01-15 DIAGNOSIS — J10.1 INFLUENZA A: ICD-10-CM

## 2020-01-15 PROBLEM — E87.6 HYPOKALEMIA: Status: ACTIVE | Noted: 2020-01-15

## 2020-01-15 PROBLEM — J11.1 INFLUENZA: Status: ACTIVE | Noted: 2020-01-15

## 2020-01-15 LAB
ALBUMIN SERPL-MCNC: 4 G/DL (ref 3.5–5.2)
ALBUMIN/GLOB SERPL: 1.1 G/DL
ALP SERPL-CCNC: 77 U/L (ref 39–117)
ALT SERPL W P-5'-P-CCNC: 17 U/L (ref 1–33)
ANION GAP SERPL CALCULATED.3IONS-SCNC: 16.9 MMOL/L (ref 5–15)
ANISOCYTOSIS BLD QL: ABNORMAL
AST SERPL-CCNC: 31 U/L (ref 1–32)
BILIRUB SERPL-MCNC: 0.3 MG/DL (ref 0.2–1.2)
BUN BLD-MCNC: 26 MG/DL (ref 6–20)
BUN/CREAT SERPL: 22.8 (ref 7–25)
CALCIUM SPEC-SCNC: 9.7 MG/DL (ref 8.6–10.5)
CHLORIDE SERPL-SCNC: 97 MMOL/L (ref 98–107)
CO2 SERPL-SCNC: 23.1 MMOL/L (ref 22–29)
CREAT BLD-MCNC: 1.14 MG/DL (ref 0.57–1)
D-LACTATE SERPL-SCNC: 1.5 MMOL/L (ref 0.5–2)
DEPRECATED RDW RBC AUTO: 38.6 FL (ref 37–54)
ERYTHROCYTE [DISTWIDTH] IN BLOOD BY AUTOMATED COUNT: 12.6 % (ref 12.3–15.4)
GFR SERPL CREATININE-BSD FRML MDRD: 50 ML/MIN/1.73
GLOBULIN UR ELPH-MCNC: 3.6 GM/DL
GLUCOSE BLD-MCNC: 108 MG/DL (ref 65–99)
HCT VFR BLD AUTO: 37.6 % (ref 34–46.6)
HGB BLD-MCNC: 12.7 G/DL (ref 12–15.9)
LYMPHOCYTES # BLD MANUAL: 0.31 10*3/MM3 (ref 0.7–3.1)
LYMPHOCYTES NFR BLD MANUAL: 2 % (ref 5–12)
LYMPHOCYTES NFR BLD MANUAL: 6.1 % (ref 19.6–45.3)
MCH RBC QN AUTO: 28.5 PG (ref 26.6–33)
MCHC RBC AUTO-ENTMCNC: 33.8 G/DL (ref 31.5–35.7)
MCV RBC AUTO: 84.3 FL (ref 79–97)
MICROCYTES BLD QL: ABNORMAL
MONOCYTES # BLD AUTO: 0.1 10*3/MM3 (ref 0.1–0.9)
NEUTROPHILS # BLD AUTO: 4.63 10*3/MM3 (ref 1.7–7)
NEUTROPHILS NFR BLD MANUAL: 91.9 % (ref 42.7–76)
NT-PROBNP SERPL-MCNC: 86 PG/ML (ref 5–900)
PLAT MORPH BLD: NORMAL
PLATELET # BLD AUTO: 173 10*3/MM3 (ref 140–450)
PMV BLD AUTO: 9.5 FL (ref 6–12)
POTASSIUM BLD-SCNC: 3.3 MMOL/L (ref 3.5–5.2)
PROT SERPL-MCNC: 7.6 G/DL (ref 6–8.5)
RBC # BLD AUTO: 4.46 10*6/MM3 (ref 3.77–5.28)
SODIUM BLD-SCNC: 137 MMOL/L (ref 136–145)
TROPONIN T SERPL-MCNC: <0.01 NG/ML (ref 0–0.03)
WBC MORPH BLD: NORMAL
WBC NRBC COR # BLD: 5.04 10*3/MM3 (ref 3.4–10.8)

## 2020-01-15 PROCEDURE — 85025 COMPLETE CBC W/AUTO DIFF WBC: CPT | Performed by: EMERGENCY MEDICINE

## 2020-01-15 PROCEDURE — 94799 UNLISTED PULMONARY SVC/PX: CPT

## 2020-01-15 PROCEDURE — 83880 ASSAY OF NATRIURETIC PEPTIDE: CPT | Performed by: EMERGENCY MEDICINE

## 2020-01-15 PROCEDURE — 94640 AIRWAY INHALATION TREATMENT: CPT

## 2020-01-15 PROCEDURE — 25010000002 CEFTRIAXONE PER 250 MG: Performed by: EMERGENCY MEDICINE

## 2020-01-15 PROCEDURE — 83605 ASSAY OF LACTIC ACID: CPT | Performed by: EMERGENCY MEDICINE

## 2020-01-15 PROCEDURE — 25010000002 AZITHROMYCIN PER 500 MG: Performed by: EMERGENCY MEDICINE

## 2020-01-15 PROCEDURE — G0378 HOSPITAL OBSERVATION PER HR: HCPCS

## 2020-01-15 PROCEDURE — 80053 COMPREHEN METABOLIC PANEL: CPT | Performed by: EMERGENCY MEDICINE

## 2020-01-15 PROCEDURE — 93005 ELECTROCARDIOGRAM TRACING: CPT | Performed by: EMERGENCY MEDICINE

## 2020-01-15 PROCEDURE — 85007 BL SMEAR W/DIFF WBC COUNT: CPT | Performed by: EMERGENCY MEDICINE

## 2020-01-15 PROCEDURE — 87040 BLOOD CULTURE FOR BACTERIA: CPT | Performed by: EMERGENCY MEDICINE

## 2020-01-15 PROCEDURE — 71250 CT THORAX DX C-: CPT

## 2020-01-15 PROCEDURE — 84484 ASSAY OF TROPONIN QUANT: CPT | Performed by: EMERGENCY MEDICINE

## 2020-01-15 PROCEDURE — 93010 ELECTROCARDIOGRAM REPORT: CPT | Performed by: INTERNAL MEDICINE

## 2020-01-15 PROCEDURE — 99284 EMERGENCY DEPT VISIT MOD MDM: CPT

## 2020-01-15 RX ORDER — ONDANSETRON 4 MG/1
4 TABLET, FILM COATED ORAL EVERY 6 HOURS PRN
Status: DISCONTINUED | OUTPATIENT
Start: 2020-01-15 | End: 2020-01-20 | Stop reason: HOSPADM

## 2020-01-15 RX ORDER — BUPROPION HYDROCHLORIDE 300 MG/1
300 TABLET ORAL EVERY EVENING
Status: DISCONTINUED | OUTPATIENT
Start: 2020-01-16 | End: 2020-01-20 | Stop reason: HOSPADM

## 2020-01-15 RX ORDER — CEFTRIAXONE SODIUM 1 G/50ML
1 INJECTION, SOLUTION INTRAVENOUS EVERY 24 HOURS
Status: DISCONTINUED | OUTPATIENT
Start: 2020-01-16 | End: 2020-01-20 | Stop reason: HOSPADM

## 2020-01-15 RX ORDER — OSELTAMIVIR PHOSPHATE 75 MG/1
75 CAPSULE ORAL ONCE
Status: COMPLETED | OUTPATIENT
Start: 2020-01-15 | End: 2020-01-15

## 2020-01-15 RX ORDER — TRAZODONE HYDROCHLORIDE 100 MG/1
200 TABLET ORAL NIGHTLY
Status: DISCONTINUED | OUTPATIENT
Start: 2020-01-15 | End: 2020-01-20 | Stop reason: HOSPADM

## 2020-01-15 RX ORDER — BISACODYL 5 MG/1
5 TABLET, DELAYED RELEASE ORAL DAILY PRN
Status: DISCONTINUED | OUTPATIENT
Start: 2020-01-15 | End: 2020-01-20 | Stop reason: HOSPADM

## 2020-01-15 RX ORDER — ACETAMINOPHEN 650 MG/1
650 SUPPOSITORY RECTAL EVERY 4 HOURS PRN
Status: DISCONTINUED | OUTPATIENT
Start: 2020-01-15 | End: 2020-01-20 | Stop reason: HOSPADM

## 2020-01-15 RX ORDER — SODIUM CHLORIDE 0.9 % (FLUSH) 0.9 %
10 SYRINGE (ML) INJECTION EVERY 12 HOURS SCHEDULED
Status: DISCONTINUED | OUTPATIENT
Start: 2020-01-15 | End: 2020-01-20 | Stop reason: HOSPADM

## 2020-01-15 RX ORDER — PANTOPRAZOLE SODIUM 40 MG/1
40 TABLET, DELAYED RELEASE ORAL EVERY EVENING
Status: DISCONTINUED | OUTPATIENT
Start: 2020-01-15 | End: 2020-01-20 | Stop reason: HOSPADM

## 2020-01-15 RX ORDER — ACETAMINOPHEN 325 MG/1
650 TABLET ORAL EVERY 4 HOURS PRN
Status: DISCONTINUED | OUTPATIENT
Start: 2020-01-15 | End: 2020-01-20 | Stop reason: HOSPADM

## 2020-01-15 RX ORDER — ONDANSETRON 2 MG/ML
4 INJECTION INTRAMUSCULAR; INTRAVENOUS EVERY 6 HOURS PRN
Status: DISCONTINUED | OUTPATIENT
Start: 2020-01-15 | End: 2020-01-20 | Stop reason: HOSPADM

## 2020-01-15 RX ORDER — ACETAMINOPHEN 160 MG/5ML
650 SOLUTION ORAL EVERY 4 HOURS PRN
Status: DISCONTINUED | OUTPATIENT
Start: 2020-01-15 | End: 2020-01-20 | Stop reason: HOSPADM

## 2020-01-15 RX ORDER — ALBUTEROL SULFATE 2.5 MG/3ML
2.5 SOLUTION RESPIRATORY (INHALATION) ONCE
Status: COMPLETED | OUTPATIENT
Start: 2020-01-15 | End: 2020-01-15

## 2020-01-15 RX ORDER — SODIUM CHLORIDE 9 MG/ML
75 INJECTION, SOLUTION INTRAVENOUS CONTINUOUS
Status: DISCONTINUED | OUTPATIENT
Start: 2020-01-15 | End: 2020-01-17

## 2020-01-15 RX ORDER — CYCLOBENZAPRINE HCL 10 MG
10 TABLET ORAL 2 TIMES DAILY PRN
Status: DISCONTINUED | OUTPATIENT
Start: 2020-01-15 | End: 2020-01-20 | Stop reason: HOSPADM

## 2020-01-15 RX ORDER — DEXTROMETHORPHAN HYDROBROMIDE AND PROMETHAZINE HYDROCHLORIDE 15; 6.25 MG/5ML; MG/5ML
7.5 SYRUP ORAL EVERY 4 HOURS PRN
Status: DISCONTINUED | OUTPATIENT
Start: 2020-01-15 | End: 2020-01-20 | Stop reason: HOSPADM

## 2020-01-15 RX ORDER — SODIUM CHLORIDE 0.9 % (FLUSH) 0.9 %
10 SYRINGE (ML) INJECTION AS NEEDED
Status: DISCONTINUED | OUTPATIENT
Start: 2020-01-15 | End: 2020-01-20 | Stop reason: HOSPADM

## 2020-01-15 RX ORDER — CEFTRIAXONE SODIUM 1 G/50ML
1 INJECTION, SOLUTION INTRAVENOUS ONCE
Status: COMPLETED | OUTPATIENT
Start: 2020-01-15 | End: 2020-01-15

## 2020-01-15 RX ORDER — FLUOXETINE HYDROCHLORIDE 20 MG/1
60 CAPSULE ORAL NIGHTLY
Status: DISCONTINUED | OUTPATIENT
Start: 2020-01-15 | End: 2020-01-20 | Stop reason: HOSPADM

## 2020-01-15 RX ORDER — OSELTAMIVIR PHOSPHATE 75 MG/1
75 CAPSULE ORAL EVERY 12 HOURS SCHEDULED
Status: DISCONTINUED | OUTPATIENT
Start: 2020-01-16 | End: 2020-01-20 | Stop reason: HOSPADM

## 2020-01-15 RX ORDER — GABAPENTIN 400 MG/1
400 CAPSULE ORAL 3 TIMES DAILY
Status: DISCONTINUED | OUTPATIENT
Start: 2020-01-15 | End: 2020-01-20 | Stop reason: HOSPADM

## 2020-01-15 RX ORDER — ALBUTEROL SULFATE 2.5 MG/3ML
2.5 SOLUTION RESPIRATORY (INHALATION) EVERY 6 HOURS PRN
Status: DISCONTINUED | OUTPATIENT
Start: 2020-01-15 | End: 2020-01-20 | Stop reason: HOSPADM

## 2020-01-15 RX ADMIN — OSELTAMIVIR PHOSPHATE 75 MG: 75 CAPSULE ORAL at 22:25

## 2020-01-15 RX ADMIN — Medication 10 ML: at 22:30

## 2020-01-15 RX ADMIN — AZITHROMYCIN 500 MG: 500 INJECTION, POWDER, LYOPHILIZED, FOR SOLUTION INTRAVENOUS at 23:07

## 2020-01-15 RX ADMIN — CEFTRIAXONE SODIUM 1 G: 1 INJECTION, SOLUTION INTRAVENOUS at 22:25

## 2020-01-15 RX ADMIN — ALBUTEROL SULFATE 2.5 MG: 2.5 SOLUTION RESPIRATORY (INHALATION) at 21:49

## 2020-01-16 LAB
ANION GAP SERPL CALCULATED.3IONS-SCNC: 15.3 MMOL/L (ref 5–15)
BASOPHILS # BLD AUTO: 0.01 10*3/MM3 (ref 0–0.2)
BASOPHILS NFR BLD AUTO: 0.3 % (ref 0–1.5)
BUN BLD-MCNC: 28 MG/DL (ref 6–20)
BUN/CREAT SERPL: 26.9 (ref 7–25)
CALCIUM SPEC-SCNC: 8.8 MG/DL (ref 8.6–10.5)
CHLORIDE SERPL-SCNC: 97 MMOL/L (ref 98–107)
CO2 SERPL-SCNC: 19.7 MMOL/L (ref 22–29)
CREAT BLD-MCNC: 1.04 MG/DL (ref 0.57–1)
DEPRECATED RDW RBC AUTO: 37 FL (ref 37–54)
EOSINOPHIL # BLD AUTO: 0 10*3/MM3 (ref 0–0.4)
EOSINOPHIL NFR BLD AUTO: 0 % (ref 0.3–6.2)
ERYTHROCYTE [DISTWIDTH] IN BLOOD BY AUTOMATED COUNT: 12.2 % (ref 12.3–15.4)
GFR SERPL CREATININE-BSD FRML MDRD: 55 ML/MIN/1.73
GLUCOSE BLD-MCNC: 103 MG/DL (ref 65–99)
HCT VFR BLD AUTO: 32.8 % (ref 34–46.6)
HGB BLD-MCNC: 11.1 G/DL (ref 12–15.9)
LYMPHOCYTES # BLD AUTO: 0.62 10*3/MM3 (ref 0.7–3.1)
LYMPHOCYTES NFR BLD AUTO: 15.9 % (ref 19.6–45.3)
MCH RBC QN AUTO: 28.2 PG (ref 26.6–33)
MCHC RBC AUTO-ENTMCNC: 33.8 G/DL (ref 31.5–35.7)
MCV RBC AUTO: 83.2 FL (ref 79–97)
MONOCYTES # BLD AUTO: 0.19 10*3/MM3 (ref 0.1–0.9)
MONOCYTES NFR BLD AUTO: 4.9 % (ref 5–12)
MRSA DNA SPEC QL NAA+PROBE: NORMAL
NEUTROPHILS # BLD AUTO: 3.07 10*3/MM3 (ref 1.7–7)
NEUTROPHILS NFR BLD AUTO: 78.4 % (ref 42.7–76)
PLATELET # BLD AUTO: 143 10*3/MM3 (ref 140–450)
PMV BLD AUTO: 9.6 FL (ref 6–12)
POTASSIUM BLD-SCNC: 3 MMOL/L (ref 3.5–5.2)
RBC # BLD AUTO: 3.94 10*6/MM3 (ref 3.77–5.28)
SODIUM BLD-SCNC: 132 MMOL/L (ref 136–145)
WBC NRBC COR # BLD: 3.91 10*3/MM3 (ref 3.4–10.8)

## 2020-01-16 PROCEDURE — 80048 BASIC METABOLIC PNL TOTAL CA: CPT | Performed by: INTERNAL MEDICINE

## 2020-01-16 PROCEDURE — 94799 UNLISTED PULMONARY SVC/PX: CPT

## 2020-01-16 PROCEDURE — 25010000002 ENOXAPARIN PER 10 MG: Performed by: INTERNAL MEDICINE

## 2020-01-16 PROCEDURE — 25010000002 CEFTRIAXONE PER 250 MG: Performed by: INTERNAL MEDICINE

## 2020-01-16 PROCEDURE — 36415 COLL VENOUS BLD VENIPUNCTURE: CPT | Performed by: INTERNAL MEDICINE

## 2020-01-16 PROCEDURE — G0378 HOSPITAL OBSERVATION PER HR: HCPCS

## 2020-01-16 PROCEDURE — 87070 CULTURE OTHR SPECIMN AEROBIC: CPT | Performed by: INTERNAL MEDICINE

## 2020-01-16 PROCEDURE — 87205 SMEAR GRAM STAIN: CPT | Performed by: INTERNAL MEDICINE

## 2020-01-16 PROCEDURE — 87641 MR-STAPH DNA AMP PROBE: CPT | Performed by: INTERNAL MEDICINE

## 2020-01-16 PROCEDURE — 85025 COMPLETE CBC W/AUTO DIFF WBC: CPT | Performed by: INTERNAL MEDICINE

## 2020-01-16 RX ORDER — POTASSIUM CHLORIDE 1.5 G/1.77G
40 POWDER, FOR SOLUTION ORAL AS NEEDED
Status: DISCONTINUED | OUTPATIENT
Start: 2020-01-16 | End: 2020-01-20 | Stop reason: HOSPADM

## 2020-01-16 RX ORDER — POTASSIUM CHLORIDE 1.5 G/1.77G
40 POWDER, FOR SOLUTION ORAL AS NEEDED
Status: DISCONTINUED | OUTPATIENT
Start: 2020-01-16 | End: 2020-01-16

## 2020-01-16 RX ORDER — POTASSIUM CHLORIDE 750 MG/1
40 CAPSULE, EXTENDED RELEASE ORAL AS NEEDED
Status: DISCONTINUED | OUTPATIENT
Start: 2020-01-16 | End: 2020-01-16

## 2020-01-16 RX ORDER — POTASSIUM CHLORIDE 7.45 MG/ML
10 INJECTION INTRAVENOUS
Status: DISCONTINUED | OUTPATIENT
Start: 2020-01-16 | End: 2020-01-20 | Stop reason: HOSPADM

## 2020-01-16 RX ORDER — POTASSIUM CHLORIDE 750 MG/1
40 CAPSULE, EXTENDED RELEASE ORAL AS NEEDED
Status: DISCONTINUED | OUTPATIENT
Start: 2020-01-16 | End: 2020-01-20 | Stop reason: HOSPADM

## 2020-01-16 RX ORDER — BUPROPION HYDROCHLORIDE 300 MG/1
300 TABLET ORAL DAILY
COMMUNITY
End: 2020-02-07 | Stop reason: SDUPTHER

## 2020-01-16 RX ORDER — POTASSIUM CHLORIDE 7.45 MG/ML
10 INJECTION INTRAVENOUS
Status: DISCONTINUED | OUTPATIENT
Start: 2020-01-16 | End: 2020-01-16

## 2020-01-16 RX ADMIN — GABAPENTIN 400 MG: 400 CAPSULE ORAL at 01:22

## 2020-01-16 RX ADMIN — BUPROPION HYDROCHLORIDE 300 MG: 300 TABLET, EXTENDED RELEASE ORAL at 16:12

## 2020-01-16 RX ADMIN — DEXTROMETHORPHAN HYDROBROMIDE AND PROMETHAZINE HYDROCHLORIDE 7.5 ML: 15; 6.25 SOLUTION ORAL at 02:22

## 2020-01-16 RX ADMIN — CEFTRIAXONE SODIUM 1 G: 1 INJECTION, SOLUTION INTRAVENOUS at 16:08

## 2020-01-16 RX ADMIN — ENOXAPARIN SODIUM 40 MG: 40 INJECTION SUBCUTANEOUS at 08:28

## 2020-01-16 RX ADMIN — SODIUM CHLORIDE, PRESERVATIVE FREE 10 ML: 5 INJECTION INTRAVENOUS at 00:43

## 2020-01-16 RX ADMIN — TRAZODONE HYDROCHLORIDE 200 MG: 100 TABLET ORAL at 21:36

## 2020-01-16 RX ADMIN — ACETAMINOPHEN 650 MG: 325 TABLET, FILM COATED ORAL at 08:28

## 2020-01-16 RX ADMIN — PANTOPRAZOLE SODIUM 40 MG: 40 TABLET, DELAYED RELEASE ORAL at 16:07

## 2020-01-16 RX ADMIN — SODIUM CHLORIDE 100 ML/HR: 9 INJECTION, SOLUTION INTRAVENOUS at 00:42

## 2020-01-16 RX ADMIN — FLUOXETINE HYDROCHLORIDE 60 MG: 20 CAPSULE ORAL at 21:37

## 2020-01-16 RX ADMIN — PANTOPRAZOLE SODIUM 40 MG: 40 TABLET, DELAYED RELEASE ORAL at 01:22

## 2020-01-16 RX ADMIN — TRAZODONE HYDROCHLORIDE 200 MG: 100 TABLET ORAL at 02:22

## 2020-01-16 RX ADMIN — POTASSIUM CHLORIDE 40 MEQ: 750 CAPSULE, EXTENDED RELEASE ORAL at 16:07

## 2020-01-16 RX ADMIN — FLUOXETINE HYDROCHLORIDE 60 MG: 20 CAPSULE ORAL at 02:22

## 2020-01-16 RX ADMIN — OSELTAMIVIR PHOSPHATE 75 MG: 75 CAPSULE ORAL at 21:37

## 2020-01-16 RX ADMIN — GABAPENTIN 400 MG: 400 CAPSULE ORAL at 21:36

## 2020-01-16 RX ADMIN — GABAPENTIN 400 MG: 400 CAPSULE ORAL at 08:28

## 2020-01-16 RX ADMIN — SODIUM CHLORIDE, PRESERVATIVE FREE 10 ML: 5 INJECTION INTRAVENOUS at 21:37

## 2020-01-16 RX ADMIN — POTASSIUM CHLORIDE 40 MEQ: 750 CAPSULE, EXTENDED RELEASE ORAL at 11:59

## 2020-01-16 RX ADMIN — DOXYCYCLINE 100 MG: 100 INJECTION, POWDER, LYOPHILIZED, FOR SOLUTION INTRAVENOUS at 08:28

## 2020-01-16 RX ADMIN — DOXYCYCLINE 100 MG: 100 INJECTION, POWDER, LYOPHILIZED, FOR SOLUTION INTRAVENOUS at 21:32

## 2020-01-16 RX ADMIN — GABAPENTIN 400 MG: 400 CAPSULE ORAL at 16:07

## 2020-01-16 RX ADMIN — OSELTAMIVIR PHOSPHATE 75 MG: 75 CAPSULE ORAL at 08:28

## 2020-01-16 NOTE — PLAN OF CARE
Pt VSS. Afebrile since this AM. Pt states she is feeling better. Sputum cx obtained. Abx continued. PT using IS frequently. Continue to monitor

## 2020-01-16 NOTE — PROGRESS NOTES
Discharge Planning Assessment  [facility]     Patient Name: Machelle Ortega  MRN: [id number]  Today's Date: 1/16/2020    Admit Date: 1/15/2020    Discharge Needs Assessment     Row Name 01/16/20 1612       Living Environment    Lives With  spouse    Current Living Arrangements  home/apartment/condo    Primary Care Provided by  self    Family Caregiver if Needed  spouse    Family Caregiver Names  Vitaliy Ortega    Quality of Family Relationships  supportive       Resource/Environmental Concerns    Resource/Environmental Concerns  none       Transition Planning    Patient/Family Anticipates Transition to  home    Patient/Family Anticipated Services at Transition  none    Transportation Anticipated  family or friend will provide       Discharge Needs Assessment    Equipment Currently Used at Home  cane, straight        Discharge Plan     Row Name 01/16/20 1613       Plan    Plan  Home    Patient/Family in Agreement with Plan  yes    Plan Comments  Met with pt at bedside.  Introduced self, explained CCP role, facesheet verified.  Pt states she lives with spouse and is independent with ADLs.  Uses cane occasionally.  Has used [facility] in past.  No history of SNF.  Plans to return home at discharge, no identified needs.  CCP will follow.  GALINDO Perez RN        Destination      Coordination has not been started for this encounter.      Durable Medical Equipment      Coordination has not been started for this encounter.      Dialysis/Infusion      Coordination has not been started for this encounter.      Home Medical Care      Coordination has not been started for this encounter.      Therapy      Coordination has not been started for this encounter.      Community Resources      Coordination has not been started for this encounter.          Demographic Summary     Row Name 01/16/20 1612       General Information    Admission Type  observation    Arrived From  home    Referral Source  admission list    Reason for Consult   discharge planning    Preferred Language  English       Contact Information    Permission Granted to Share Info With  family/designee Vitaliy Ortega (spouse) [phone]        Functional Status     Row Name 01/16/20 1612       Functional Status, IADL    Medications  independent    Meal Preparation  independent    Housekeeping  independent    Laundry  independent    Shopping  independent        Psychosocial    No documentation.       Abuse/Neglect    No documentation.       Legal    No documentation.       Substance Abuse    No documentation.       Patient Forms    No documentation.           Ebony Perez RN

## 2020-01-16 NOTE — H&P
Patient Name:  Machelle Ortega  YOB: 1965  MRN:  [id number]  Admit Date:  1/15/2020  Patient Care Team:  Iqra Powell MD as PCP - General (Internal Medicine)  Hailey Maguire APRN as PCP - Claims Attributed  Antionette Herndon MD as Consulting Physician (Obstetrics and Gynecology)  Andrew Hopkins III, MD as Surgeon (Thoracic Surgery)  Dayron Jarrett MD as Surgeon (Neurosurgery)  Vitaliy Schafer MD as Consulting Physician (Pulmonary Disease)  Mohit Natarajan MD as Surgeon (General Surgery)  Vicente Cheng MD as Consulting Physician (Pulmonary Disease)  Ranjit London MD as Consulting Physician (Pain Medicine)  Fatimah Manley MD as Consulting Physician (Orthopedic Surgery)  Neftali Nayak MD as Consulting Physician (Physical Medicine and Rehabilitation)      Subjective   History Present Illness     Chief Complaint   Patient presents with   • Shortness of Breath       Ms. Ortega is a 54 y.o. 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 Benjamin with also recurrence of her diaphragmatic hernia.  She presented with symptoms of cough.  On Sunday she was diagnosed with the flu as an outpatient and started on Delflusa.  The patient has not been improving and has had persistent productive cough as well as fevers chills, nausea and vomiting and right-sided pleuritic chest pain.  She presented to [facility] ER and had CT scan which had showed significant pneumonia.  She was given IV antibiotics in the ER.    History of Present Illness  Review of Systems   Constitutional: Positive for chills and fever.   HENT: Positive for congestion. Negative for dental problem.    Eyes: Negative.    Respiratory: Positive for cough, chest tightness and shortness of breath.    Cardiovascular: Negative for palpitations and leg swelling.   Gastrointestinal: Positive for nausea and vomiting.      Endocrine: Negative.    Genitourinary: Negative.    Musculoskeletal: Negative.    Skin: Negative.    Allergic/Immunologic: Negative.    Neurological: Negative.    Hematological: Negative.    Psychiatric/Behavioral: Negative.         Personal History     Past Medical History:   Diagnosis Date   • Anxiety    • Arthritis    • Asthma Seasonally   • Attention deficit hyperactivity disorder    • Balance problem    • Chronic pain    • Claustrophobia    • DDD (degenerative disc disease), lumbar    • Depression    • Fatigue    • Fibromyalgia    • Fibromyositis    • GERD (gastroesophageal reflux disease)    • History of mononucleosis    • Hyperlipidemia    • IBS (irritable bowel syndrome)    • Iron deficiency anemia    • Liver anomaly, congenital     ectopic liver   • Lumbago    • Mood disorder (CMS/HCC)    • Sleep apnea     Compliant w/ C-Pap     Past Surgical History:   Procedure Laterality Date   • APPENDECTOMY      Emergency performed by [facility]   • BLADDER NECK RECONSTRUCTION N/A    • BREAST BIOPSY  Several    On both sides   • BREAST CYST ASPIRATION     • BREAST CYST ASPIRATION     • BRONCHOSCOPY N/A 2014    Chronic cough with abnormal chest x-ray, the exam was normal, fluoroscopically guided transbronchial brushings were obtained, transbronchial lung biopsies were performed, bronchoalveolar lavage was performed-Dr. Vitaliy Schafer   • CERVICAL CORPECTOMY N/A 2014    Anterior cervical corpectomy at C4 and C5; Anterior cervical arthrodesis at C3-C4, C4-C5 and C5-C6; placement of instrumentation; use of intraoperative microscope for microdissection; harvest of vertebral body autograft through same incision; prep of morcellized allograft-Dr. Dayron Jarrett   •  SECTION N/A 2007    Dr. Briana Mcdaniel   • COLONOSCOPY N/A 3/22/2017    Procedure: COLONOSCOPY TO CECUM ;  Surgeon: Mohit Natarajan MD;  Location[facility];  Service:    • D&C HYSTEROSCOPY N/A 2005    Hysteroscopy, D&C, and  paracervical block-Dr. Antionette Herndon   • D&C HYSTEROSCOPY ENDOMETRIAL ABLATION N/A 09/15/2014    Hysteroscopy, D&C, NovaSure ablation-Dr. Antionette Herndon   • ENDOSCOPY N/A 3/22/2017    Procedure: ESOPHAGOGASTRODUODENOSCOPY WITH BX ;  Surgeon: Mohit Natarajan MD;  Location: [facility] ENDOSCOPY;  Service:    • EYE SURGERY  1996    Lasik   • LIVER SURGERY     • LUMBAR SPINAL TUMOR REMOVAL N/A     spinal tumor 2006  T10/11 schwannoma   • LUNG LOBECTOMY Right 06/26/2014    Exploratory bronchoscopy, exploratory right video-assisted thoracoscopy, parietal pleural biopsy, diaphragmatic biopsy, wedge resection of right lower lobe, excision of ectopic hepatic lobe, primary repair of diagphragm, subpleural pain catheters x2, mechanical pleurodesis-Dr. Darryl Benjamin   • SPINAL CORD STIMULATOR IMPLANT      TRIAL   • SPINAL CORD STIMULATOR IMPLANT Bilateral 12/28/2017    Procedure: SPINAL CORD STIMULATOR INSERTION PHASE 1,  laminotomy for placement of [employer] lead  from T9 to T6;  Surgeon: Dayron Jarrett MD;  Location: [facility] MAIN OR;  Service:    • SPINAL CORD STIMULATOR IMPLANT N/A 12/29/2017    Procedure: SPINAL CORD STIMULATOR INSERTION PHASE 2;  Surgeon: Dayron Jarrett MD;  Location: [facility] MAIN OR;  Service:      Family History   Problem Relation Age of Onset   • Alcohol abuse Other    • Arthritis Other    • Diabetes Other    • Drug abuse Other    • Hepatitis Other    • Hypertension Other    • Depression Mother    • Lung cancer Mother    • Thyroid disease Mother    • Arthritis Mother    • Cancer Mother         Lung cancer 2016   • Diabetes Mother    • Hyperlipidemia Mother    • Hypertension Mother    • Miscarriages / Stillbirths Mother    • Colon polyps Mother    • Hashimoto's thyroiditis Daughter    • Breast cancer Maternal Aunt    • Prostate cancer Maternal Uncle    • Cancer Maternal Uncle    • Heart disease Paternal Grandmother    • Alcohol abuse Paternal Grandmother    • Asthma Paternal Grandmother     • Depression Paternal Grandmother    • Alzheimer's disease Paternal Grandfather    • Alcohol abuse Paternal Grandfather    • Depression Paternal Grandfather    • Alcohol abuse Brother    • Arthritis Brother    • Depression Brother    • Drug abuse Brother         Marijuana for chronic pain   • Learning disabilities Brother         ADHD   • Arthritis Maternal Grandmother    • Depression Maternal Grandmother    • Diabetes Maternal Grandmother    • Heart disease Maternal Grandmother    • Hyperlipidemia Maternal Grandmother    • Hypertension Maternal Grandmother    • Birth defects Maternal Uncle         CP   • Hyperlipidemia Maternal Uncle    • Cancer Maternal Grandfather         Kidney, bone cancer 1980's   • Cancer Maternal Aunt         Breast cancer 2003   • Depression Maternal Aunt    • Miscarriages / Stillbirths Maternal Aunt    • Cancer Maternal Aunt         Lung cancer 2015   • Depression Maternal Aunt    • Diabetes Maternal Aunt    • Cancer Maternal Uncle         Prostate Cancer   • Cancer Maternal Uncle         Thymus and Pancreatic Cancer   • Depression Father    • Diabetes Father    • Hyperlipidemia Father    • Colon polyps Father    • Depression Daughter    • Learning disabilities Daughter         ADHD   • Diabetes Maternal Aunt         Pancreatic Insulinoma removed   • Learning disabilities Son         ADHD, SPD   • Malig Hyperthermia Neg Hx      Social History     Tobacco Use   • Smoking status: Never Smoker   • Smokeless tobacco: Never Used   Substance Use Topics   • Alcohol use: Yes     Comment: Socially on holidays   • Drug use: Yes     Types: Other     Comment: Gababentin.Lamictal   home meds reviewed and documented in ER    (Not in a hospital admission)  Allergies:    Allergies   Allergen Reactions   • Budesonide-Formoterol Fumarate Swelling     Throat   • Oxycodone-Acetaminophen Hives   • Pregabalin Other (See Comments)     Weight gain       Objective    Objective     Vital Signs  Temp:  [100.8 °F  (38.2 °C)] 100.8 °F (38.2 °C)  Heart Rate:  [74-89] 76  Resp:  [16] 16  BP: (103-130)/(52-80) 103/54  SpO2:  [92 %-96 %] 96 %  on   ;   Device (Oxygen Therapy): room air   Body mass index is 33.8 kg/m².    Physical Exam   Constitutional: She is oriented to person, place, and time. She appears well-developed. She appears distressed (mildly ill).   HENT:   Head: Normocephalic and atraumatic.   Mouth/Throat: Oropharynx is clear and moist.   Eyes: Pupils are equal, round, and reactive to light. EOM are normal. No scleral icterus.   Neck: Normal range of motion. Neck supple.   Cardiovascular: Normal rate, regular rhythm and normal heart sounds.   No murmur heard.  Pulmonary/Chest: Effort normal. She has rales.   Abdominal: Soft. Bowel sounds are normal. She exhibits no distension. There is no tenderness.   Musculoskeletal: She exhibits no edema or deformity.   Neurological: She is alert and oriented to person, place, and time. No cranial nerve deficit.   Skin: Skin is warm and dry. No rash noted.   Psychiatric: She has a normal mood and affect. Her behavior is normal. Thought content normal.   Nursing note and vitals reviewed.      Results Review:  I reviewed the patient's new clinical results.  I reviewed the patient's new imaging results and agree with the interpretation.  I reviewed the patient's other test results and agree with the interpretation  I personally viewed and interpreted the patient's EKG/Telemetry data  Discussed with ED provider.    Lab Results (last 24 hours)     Procedure Component Value Units Date/Time    CBC & Differential [[id number]] Collected:  01/15/20 2112    Specimen:  Blood Updated:  01/15/20 2153    Narrative:       The following orders were created for panel order CBC & Differential.  Procedure                               Abnormality         Status                     ---------                               -----------         ------                     CBC Auto Differential[[id number]]         Normal              Final result                 Please view results for these tests on the individual orders.    Comprehensive Metabolic Panel [[id number]]  (Abnormal) Collected:  01/15/20 2112    Specimen:  Blood Updated:  01/15/20 2157     Glucose 108 mg/dL      BUN 26 mg/dL      Creatinine 1.14 mg/dL      Sodium 137 mmol/L      Potassium 3.3 mmol/L      Chloride 97 mmol/L      CO2 23.1 mmol/L      Calcium 9.7 mg/dL      Total Protein 7.6 g/dL      Albumin 4.00 g/dL      ALT (SGPT) 17 U/L      AST (SGOT) 31 U/L      Alkaline Phosphatase 77 U/L      Total Bilirubin 0.3 mg/dL      eGFR Non African Amer 50 mL/min/1.73      Globulin 3.6 gm/dL      A/G Ratio 1.1 g/dL      BUN/Creatinine Ratio 22.8     Anion Gap 16.9 mmol/L     Narrative:       GFR Normal >60  Chronic Kidney Disease <60  Kidney Failure <15      BNP [[id number]]  (Normal) Collected:  01/15/20 2112    Specimen:  Blood Updated:  01/15/20 2155     proBNP 86.0 pg/mL     Narrative:       Among patients with dyspnea, NT-proBNP is highly sensitive for the detection of acute congestive heart failure. In addition NT-proBNP of <300 pg/ml effectively rules out acute congestive heart failure with 99% negative predictive value.    Results may be falsely decreased if patient taking Biotin.      Troponin [[id number]]  (Normal) Collected:  01/15/20 2112    Specimen:  Blood Updated:  01/15/20 2157     Troponin T <0.010 ng/mL     Narrative:       Troponin T Reference Range:  <= 0.03 ng/mL-   Negative for AMI  >0.03 ng/mL-     Abnormal for myocardial necrosis.  Clinicians would have to utilize clinical acumen, EKG, Troponin and serial changes to determine if it is an Acute Myocardial Infarction or myocardial injury due to an underlying chronic condition.       Results may be falsely decreased if patient taking Biotin.      CBC Auto Differential [[id number]]  (Normal) Collected:  01/15/20 2112    Specimen:  Blood Updated:  01/15/20 2153     WBC 5.04 10*3/mm3      RBC 4.46  10*6/mm3      Hemoglobin 12.7 g/dL      Hematocrit 37.6 %      MCV 84.3 fL      MCH 28.5 pg      MCHC 33.8 g/dL      RDW 12.6 %      RDW-SD 38.6 fl      MPV 9.5 fL      Platelets 173 10*3/mm3     Manual Differential [[id number]]  (Abnormal) Collected:  01/15/20 2112    Specimen:  Blood Updated:  01/15/20 2153     Neutrophil % 91.9 %      Lymphocyte % 6.1 %      Monocyte % 2.0 %      Neutrophils Absolute 4.63 10*3/mm3      Lymphocytes Absolute 0.31 10*3/mm3      Monocytes Absolute 0.10 10*3/mm3      Anisocytosis Slight/1+     Microcytes Slight/1+     WBC Morphology Normal     Platelet Morphology Normal    Lactic Acid, Plasma [[id number]]  (Normal) Collected:  01/15/20 2216    Specimen:  Blood Updated:  01/15/20 2303     Lactate 1.5 mmol/L     Blood Culture - Blood, Arm, Left [[id number]] Collected:  01/15/20 2217    Specimen:  Blood from Arm, Left Updated:  01/15/20 2239    Blood Culture - Blood, Arm, Right [[id number]] Collected:  01/15/20 2223    Specimen:  Blood from Arm, Right Updated:  01/15/20 2240          Imaging Results (Last 24 Hours)     Procedure Component Value Units Date/Time    CT Chest Without Contrast [[id number]] Collected:  01/15/20 2201     Updated:  01/15/20 2201    Narrative:       CT OF THE CHEST WITHOUT CONTRAST 01/15/2020     HISTORY: Cough, fever.     TECHNIQUE: Axial images were obtained from the lung apices to the upper  abdomen. No intravenous contrast was given.     FINDINGS: In the right upper lobe there is a moderately large area of  consolidation containing air bronchograms with some scattered smaller  areas of increased density.     In the right lower lobe there is a moderately large area of  consolidation with air bronchograms as well.     There is evidence of previous right diaphragmatic repair but there still  is a small diaphragmatic hernia with a small focus of liver herniating  into the lower chest. This is best seen on the sagittal reconstruction  images with this small focus of  herniated liver measuring approximately  2.4 cm. In the left lower lobe there is some mild patchy atelectasis or  pneumonia.     Shotty mediastinal nodes are seen. Small amount of fluid is seen in  pericardial recesses. Small calcified left hilar region lymph node is  seen.       Impression:       1. Moderately severe areas of dense pneumonia involving the right upper  lobe and right lower lobe and minimally in the left lower lobe.  2. There is evidence of previous right diaphragm repair with a right  diaphragmatic hernia containing a small portion of the liver as  discussed above.   3. Short-term follow-up CT of the chest in approximately 2 months  recommended.  4. Findings were discussed with Dr. Ceballos.     Radiation dose reduction techniques were utilized, including automated  exposure control and exposure modulation based on body size.                Results for orders placed in visit on 09/15/14   SCANNED - ECHOCARDIOGRAM       ECG 12 Lead   Preliminary Result   HEART RATE= 77  bpm   RR Interval= 780  ms   MI Interval= 130  ms   P Horizontal Axis= 17  deg   P Front Axis= 46  deg   QRSD Interval= 96  ms   QT Interval= 354  ms   QRS Axis= 21  deg   T Wave Axis= 28  deg   - NORMAL ECG -   Sinus rhythm   Electronically Signed By:    Date and Time of Study: 2020-01-15 21:00:53           Assessment/Plan     Active Hospital Problems    Diagnosis POA   • **Multifocal pneumonia [J18.9] Yes   • Influenza [J11.1] Yes   • Hypokalemia [E87.6] Yes   • Sleep apnea [G47.30] Yes   • GERD (gastroesophageal reflux disease) [K21.9] Yes     Patient with suspected secondary bacterial pneumonia after recent diagnosis of influenza.  Will give IV antibiotics and follow-up cultures.  Her situation is slightly more complicated with her previous diaphragmatic hernia repair though this has overall been stable.  Closely monitor.  See orders for further details.      VTE Prophylaxis - Lovenox 40 mg SC daily.  Code Status - Full  code.       Cecil Villalba MD  [facility]  01/15/20  11:38 PM

## 2020-01-16 NOTE — PROGRESS NOTES
Name: Machelle Ortega ADMIT: 1/15/2020   : 1965  PCP: Iqra Powell MD    MRN: [id number] LOS: 0 days   AGE/SEX: 54 y.o. female  ROOM: [facility]     Subjective   Subjective   Patient was seen and examined at bedside no acute distress she is resting comfortably she is coughing up sputum yellowish.  Denies any chest pain palpitations    Review of Systems     Objective   Objective   Temp:  [97.7 °F (36.5 °C)-101.4 °F (38.6 °C)] 99.1 °F (37.3 °C)  Heart Rate:  [68-89] 68  Resp:  [16-18] 16  BP: ()/(43-80) 91/47  SpO2:  [83 %-96 %] 91 %  on  Flow (L/min):  [2-4] 4;   Device (Oxygen Therapy): nasal cannula  Body mass index is 32.09 kg/m².    Physical Exam  Alert awake oriented no acute distress  HEENT normocephalic atraumatic neck PERRLA  Cardio S1-S2 appreciated regular rate and rhythm  Chest mild rales  Willie soft nontender nondistended positive bowel sounds  Extremities no edema positive peripheral pulses  Results Review:       I reviewed the patient's new clinical results.  Results from last 7 days   Lab Units 20  0502 01/15/20  2112   WBC 10*3/mm3 3.91 5.04   HEMOGLOBIN g/dL 11.1* 12.7   PLATELETS 10*3/mm3 143 173     Results from last 7 days   Lab Units 20  0502 01/15/20  2112   SODIUM mmol/L 132* 137   POTASSIUM mmol/L 3.0* 3.3*   CHLORIDE mmol/L 97* 97*   CO2 mmol/L 19.7* 23.1   BUN mg/dL 28* 26*   CREATININE mg/dL 1.04* 1.14*   GLUCOSE mg/dL 103* 108*   Estimated Creatinine Clearance: 66.3 mL/min (A) (by C-G formula based on SCr of 1.04 mg/dL (H)).  Results from last 7 days   Lab Units 20  0502 01/15/20  2112   CALCIUM mg/dL 8.8 9.7   ALBUMIN g/dL  --  4.00     Results from last 7 days   Lab Units 01/15/20  2216   LACTATE mmol/L 1.5   No results found for: STREPPNEUAG, LEGANTIGENUR  Results from last 7 days   Lab Units 01/15/20  2223   BLOODCX  No growth at less than 24 hours           buPROPion  mg Oral Q PM   cefTRIAXone 1 g Intravenous Q24H   doxycycline 100 mg  Intravenous Q12H   enoxaparin 40 mg Subcutaneous Q24H   FLUoxetine 60 mg Oral Nightly   gabapentin 400 mg Oral TID   [START ON 1/17/2020] influenza vaccine 0.5 mL Intramuscular Once   oseltamivir 75 mg Oral Q12H   pantoprazole 40 mg Oral Q PM   sodium chloride 10 mL Intravenous Q12H   traZODone 200 mg Oral Nightly       sodium chloride 75 mL/hr Last Rate: 75 mL/hr (01/16/20 1123)   Diet Regular         Assessment/Plan     Active Hospital Problems    Diagnosis  POA   • **Multifocal pneumonia [J18.9]  Yes   • Influenza [J11.1]  Yes   • Hypokalemia [E87.6]  Yes   • Sleep apnea [G47.30]  Yes   • GERD (gastroesophageal reflux disease) [K21.9]  Yes      Resolved Hospital Problems   No resolved problems to display.       54 y.o. female with Multifocal pneumonia.  1.  Multifocal pneumonia is evaluated on the CT of the chest with continue azithromycin and Rocephin.  We will get a repeat chest x-ray tomorrow morning in the meantime we will continue Tamiflu as well recently diagnosed with the flu.  Nebulizer treatments and oxygen therapy.  Low-dose IV fluids.  Tylenol as needed any fevers also get a sputum sample for culture  2.  DVT prophylaxis is Lovenox  3 hypokalemia we will replace her potassium and get a repeat level tomorrow a.m.      Vera Rosenberg MD  [facility]  01/16/20  1:17 PM

## 2020-01-16 NOTE — PROGRESS NOTES
Clinical Pharmacy Services: Medication History    Machelle Ortega is a 54 y.o. female presenting to [facility] for Influenza A [J10.1]  Multifocal pneumonia [J18.9]    She  has a past medical history of Anxiety, Arthritis, Asthma (Seasonally), Attention deficit hyperactivity disorder, Balance problem, Chronic pain, Claustrophobia, DDD (degenerative disc disease), lumbar, Depression, Fatigue, Fibromyalgia, Fibromyositis, GERD (gastroesophageal reflux disease), History of mononucleosis, Hyperlipidemia, IBS (irritable bowel syndrome), Iron deficiency anemia, Liver anomaly, congenital, Lumbago, Mood disorder (CMS/[facility]), and Sleep apnea.    Allergies as of 01/15/2020 - Reviewed 01/15/2020   Allergen Reaction Noted    Budesonide-formoterol fumarate Swelling 12/18/2015    Oxycodone-acetaminophen Hives 12/18/2015    Pregabalin Other (See Comments) 12/18/2015       Medication information was obtained from: patient   Pharmacy and Phone Number: [facility]/pharmacy #[id number] - [place], [place] - [address] AT [facility] [phone] [facility] [phone] FX    Prior to Admission Medications       Prescriptions Last Dose Informant Patient Reported? Taking?    albuterol sulfate  (90 Base) MCG/ACT inhaler 1/14/2020 Self No Yes    Inhale 2 puffs Every 4 (Four) Hours As Needed for Wheezing.    buPROPion XL (WELLBUTRIN XL) 300 MG 24 hr tablet 1/15/2020 Self Yes Yes    Take 300 mg by mouth Daily.    FLUoxetine (PROzac) 20 MG capsule Past Week Self No Yes    Take 3 capsules by mouth Every Night.    pantoprazole (PROTONIX) 40 MG EC tablet Past Week Self No Yes    Take 1 tablet by mouth Every Evening.    traZODone (DESYREL) 100 MG tablet Past Week Self No Yes    TAKE 1-2 TABLET BY MOUTH AT BEDTIME    cyclobenzaprine (FLEXERIL) 10 MG tablet More than a month Self No No    Take 1 tablet by mouth 2 (Two) Times a Day As Needed for Muscle Spasms.                Medication notes:   Adderal and gabapentin rx were not  filled at [facility] in the past year. I removed them from here med history. However, the patient stated that she does sometimes take gabapentin at night. It appears that she had gabapentin 400 mg TID 1/25/17.   She also takes an alderberry supplement and plans to start a tumeric supplement     This medication list is complete to the best of my knowledge as of 1/16/2020    Please call if questions.    Dayami Recinos, PharmD, BCPS  1/16/2020 1:06 PM

## 2020-01-16 NOTE — ED PROVIDER NOTES
EMERGENCY DEPARTMENT ENCOUNTER    Room Number:  33/33  Date seen:  1/15/2020  Time seen: 8:43 PM  PCP: Iqra Powell MD  Historian: Pt      HPI:  Chief Complaint: Flu-like symptoms  A complete HPI/ROS/PMH/PSH/SH/FH are unobtainable due to: None  Context: Machelle Ortega is a 54 y.o. female who presents to the ED c/o gradual onset, constant, worsening flu symptoms over the last couple of days. She c/o R sided chest pain, generalized weakness, diarrhea, vomiting (resolved), productive cough and congestion. Pt was diagnosed with the influenza A 4 days ago. Pt has attempted to alleviated sx with unspecified cough syrup, inhalers, aleve and Xofluza. 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 Pt had surgery done at that time. No other complaints at this time.             PAST MEDICAL HISTORY  Active Ambulatory Problems     Diagnosis Date Noted   • Abdominal pain 06/07/2016   • Ataxic gait 06/07/2016   • Cervical pain 06/07/2016   • Chest pain, pleuritic 06/07/2016   • DDD (degenerative disc disease), cervical 06/07/2016   • DDD (degenerative disc disease), lumbar 06/07/2016   • Dry cough 06/07/2016   • Extremity pain 06/07/2016   • Low back pain 06/07/2016   • Chronic nausea 06/07/2016   • Loss of feeling or sensation 06/07/2016   • Pain in thoracic spine 06/07/2016   • Pleural cavity effusion 06/07/2016   • Breath shortness 06/07/2016   • Cervical spinal stenosis 06/07/2016   • IBS (irritable bowel syndrome) 06/07/2016   • GERD (gastroesophageal reflux disease) 06/07/2016   • Multiple joint complaints 06/07/2016   • ADD (attention deficit disorder) without hyperactivity 06/07/2016   • Fibromyalgia 06/07/2016   • Mood disorder (CMS/HCC) 06/07/2016   • Fatigue 06/07/2016   • Sleep apnea 06/07/2016   • Hyperlipidemia 06/07/2016   • History of benign schwannoma 01/09/2017   • Other cervical disc  degeneration, high cervical region 2017   • Chronic pain 2017   • Postlaminectomy syndrome, thoracic 2017   • Diaphragmatic hernia without obstruction and without gangrene 2017   • Lumbar radiculopathy 2017   • History of spinal surgery 2017   • Neuropathic pain 2017   • Balance problem 10/03/2018   • Primary insomnia 10/03/2018     Resolved Ambulatory Problems     Diagnosis Date Noted   • No Resolved Ambulatory Problems     Past Medical History:   Diagnosis Date   • Anxiety    • Arthritis    • Asthma Seasonally   • Attention deficit hyperactivity disorder    • Claustrophobia    • Depression    • Fibromyositis    • History of mononucleosis    • Iron deficiency anemia    • Liver anomaly, congenital    • Lumbago          PAST SURGICAL HISTORY  Past Surgical History:   Procedure Laterality Date   • APPENDECTOMY      Emergency performed by [facility]   • BLADDER NECK RECONSTRUCTION N/A    • BREAST BIOPSY  Several    On both sides   • BREAST CYST ASPIRATION     • BREAST CYST ASPIRATION     • BRONCHOSCOPY N/A 2014    Chronic cough with abnormal chest x-ray, the exam was normal, fluoroscopically guided transbronchial brushings were obtained, transbronchial lung biopsies were performed, bronchoalveolar lavage was performed-Dr. Vitaliy Schafer   • CERVICAL CORPECTOMY N/A 2014    Anterior cervical corpectomy at C4 and C5; Anterior cervical arthrodesis at C3-C4, C4-C5 and C5-C6; placement of instrumentation; use of intraoperative microscope for microdissection; harvest of vertebral body autograft through same incision; prep of morcellized allograft-Dr. Dayron Jarrett   •  SECTION N/A 2007    Dr. Briana Mcdaniel   • COLONOSCOPY N/A 3/22/2017    Procedure: COLONOSCOPY TO CECUM ;  Surgeon: Mohit Natarajan MD;  Location: [facility] ENDOSCOPY;  Service:    • D&C HYSTEROSCOPY N/A 2005    Hysteroscopy, D&C, and paracervical block-Dr. Antionette Herndon   • D&C  HYSTEROSCOPY ENDOMETRIAL ABLATION N/A 09/15/2014    Hysteroscopy, D&C, NovaSure ablation-Dr. Antionette Herndon   • ENDOSCOPY N/A 3/22/2017    Procedure: ESOPHAGOGASTRODUODENOSCOPY WITH BX ;  Surgeon: Mohit Natarajan MD;  Location: [facility] ENDOSCOPY;  Service:    • EYE SURGERY  1996    Lasik   • LIVER SURGERY     • LUMBAR SPINAL TUMOR REMOVAL N/A     spinal tumor 2006  T10/11 schwannoma   • LUNG LOBECTOMY Right 06/26/2014    Exploratory bronchoscopy, exploratory right video-assisted thoracoscopy, parietal pleural biopsy, diaphragmatic biopsy, wedge resection of right lower lobe, excision of ectopic hepatic lobe, primary repair of diagphragm, subpleural pain catheters x2, mechanical pleurodesis-Dr. Darryl Benjamin   • SPINAL CORD STIMULATOR IMPLANT      TRIAL   • SPINAL CORD STIMULATOR IMPLANT Bilateral 12/28/2017    Procedure: SPINAL CORD STIMULATOR INSERTION PHASE 1,  laminotomy for placement of [employer] lead  from T9 to T6;  Surgeon: Dayron Jarrett MD;  Location: [facility] OR;  Service:    • SPINAL CORD STIMULATOR IMPLANT N/A 12/29/2017    Procedure: SPINAL CORD STIMULATOR INSERTION PHASE 2;  Surgeon: Dayron Jarrett MD;  Location: [facility] MAIN OR;  Service:          FAMILY HISTORY  Family History   Problem Relation Age of Onset   • Alcohol abuse Other    • Arthritis Other    • Diabetes Other    • Drug abuse Other    • Hepatitis Other    • Hypertension Other    • Depression Mother    • Lung cancer Mother    • Thyroid disease Mother    • Arthritis Mother    • Cancer Mother         Lung cancer 2016   • Diabetes Mother    • Hyperlipidemia Mother    • Hypertension Mother    • Miscarriages / Stillbirths Mother    • Colon polyps Mother    • Hashimoto's thyroiditis Daughter    • Breast cancer Maternal Aunt    • Prostate cancer Maternal Uncle    • Cancer Maternal Uncle    • Heart disease Paternal Grandmother    • Alcohol abuse Paternal Grandmother    • Asthma Paternal Grandmother    • Depression Paternal  Grandmother    • Alzheimer's disease Paternal Grandfather    • Alcohol abuse Paternal Grandfather    • Depression Paternal Grandfather    • Alcohol abuse Brother    • Arthritis Brother    • Depression Brother    • Drug abuse Brother         Marijuana for chronic pain   • Learning disabilities Brother         ADHD   • Arthritis Maternal Grandmother    • Depression Maternal Grandmother    • Diabetes Maternal Grandmother    • Heart disease Maternal Grandmother    • Hyperlipidemia Maternal Grandmother    • Hypertension Maternal Grandmother    • Birth defects Maternal Uncle         CP   • Hyperlipidemia Maternal Uncle    • Cancer Maternal Grandfather         Kidney, bone cancer 1980's   • Cancer Maternal Aunt         Breast cancer 2003   • Depression Maternal Aunt    • Miscarriages / Stillbirths Maternal Aunt    • Cancer Maternal Aunt         Lung cancer 2015   • Depression Maternal Aunt    • Diabetes Maternal Aunt    • Cancer Maternal Uncle         Prostate Cancer   • Cancer Maternal Uncle         Thymus and Pancreatic Cancer   • Depression Father    • Diabetes Father    • Hyperlipidemia Father    • Colon polyps Father    • Depression Daughter    • Learning disabilities Daughter         ADHD   • Diabetes Maternal Aunt         Pancreatic Insulinoma removed   • Learning disabilities Son         ADHD, SPD   • Malig Hyperthermia Neg Hx          SOCIAL HISTORY  Social History     Socioeconomic History   • Marital status:      Spouse name: Not on file   • Number of children: Not on file   • Years of education: Not on file   • Highest education level: Not on file   Tobacco Use   • Smoking status: Never Smoker   • Smokeless tobacco: Never Used   Substance and Sexual Activity   • Alcohol use: Yes     Comment: Socially on holidays   • Drug use: Yes     Types: Other     Comment: Gababentin.Lamictal   • Sexual activity: Never         ALLERGIES  Budesonide-formoterol fumarate; Oxycodone-acetaminophen; and  Pregabalin        REVIEW OF SYSTEMS  Review of Systems   Constitutional: Negative for diaphoresis and fever.   HENT: Positive for congestion.    Eyes: Negative for visual disturbance.   Respiratory: Positive for cough (productive). Negative for shortness of breath.    Cardiovascular: Positive for chest pain (R sided). Negative for palpitations.   Gastrointestinal: Positive for diarrhea and vomiting (resolved at this time). Negative for blood in stool.   Endocrine: Negative for polyuria.   Genitourinary: Negative for flank pain.   Musculoskeletal: Negative for joint swelling.   Skin: Negative for wound.   Neurological: Positive for weakness (generalized). Negative for seizures.   Hematological: Negative for adenopathy.   Psychiatric/Behavioral: Negative for sleep disturbance.          PHYSICAL EXAM  ED Triage Vitals [01/15/20 2032]   Temp Heart Rate Resp BP SpO2   (!) 100.8 °F (38.2 °C) 89 16 -- 93 %      Temp src Heart Rate Source Patient Position BP Location FiO2 (%)   Tympanic Monitor -- -- --         GENERAL: awake and alert, no acute distress, appears uncomfortable  HENT: nares patent  EYES: no scleral icterus  CV: regular rhythm, normal rate, no murmur  RESPIRATORY: normal effort, mild rhonchi throughoutt R lung fields, no wheezing  ABDOMEN: soft, non-tender throughout  MUSCULOSKELETAL: no deformity, no BLE edema  NEURO: alert, moves all extremities, follows commands  SKIN: warm, dry    Vital signs and nursing notes reviewed.      LAB RESULTS  Recent Results (from the past 24 hour(s))   Comprehensive Metabolic Panel    Collection Time: 01/15/20  9:12 PM   Result Value Ref Range    Glucose 108 (H) 65 - 99 mg/dL    BUN 26 (H) 6 - 20 mg/dL    Creatinine 1.14 (H) 0.57 - 1.00 mg/dL    Sodium 137 136 - 145 mmol/L    Potassium 3.3 (L) 3.5 - 5.2 mmol/L    Chloride 97 (L) 98 - 107 mmol/L    CO2 23.1 22.0 - 29.0 mmol/L    Calcium 9.7 8.6 - 10.5 mg/dL    Total Protein 7.6 6.0 - 8.5 g/dL    Albumin 4.00 3.50 - 5.20 g/dL     ALT (SGPT) 17 1 - 33 U/L    AST (SGOT) 31 1 - 32 U/L    Alkaline Phosphatase 77 39 - 117 U/L    Total Bilirubin 0.3 0.2 - 1.2 mg/dL    eGFR Non African Amer 50 (L) >60 mL/min/1.73    Globulin 3.6 gm/dL    A/G Ratio 1.1 g/dL    BUN/Creatinine Ratio 22.8 7.0 - 25.0    Anion Gap 16.9 (H) 5.0 - 15.0 mmol/L   BNP    Collection Time: 01/15/20  9:12 PM   Result Value Ref Range    proBNP 86.0 5.0 - 900.0 pg/mL   Troponin    Collection Time: 01/15/20  9:12 PM   Result Value Ref Range    Troponin T <0.010 0.000 - 0.030 ng/mL   CBC Auto Differential    Collection Time: 01/15/20  9:12 PM   Result Value Ref Range    WBC 5.04 3.40 - 10.80 10*3/mm3    RBC 4.46 3.77 - 5.28 10*6/mm3    Hemoglobin 12.7 12.0 - 15.9 g/dL    Hematocrit 37.6 34.0 - 46.6 %    MCV 84.3 79.0 - 97.0 fL    MCH 28.5 26.6 - 33.0 pg    MCHC 33.8 31.5 - 35.7 g/dL    RDW 12.6 12.3 - 15.4 %    RDW-SD 38.6 37.0 - 54.0 fl    MPV 9.5 6.0 - 12.0 fL    Platelets 173 140 - 450 10*3/mm3   Manual Differential    Collection Time: 01/15/20  9:12 PM   Result Value Ref Range    Neutrophil % 91.9 (H) 42.7 - 76.0 %    Lymphocyte % 6.1 (L) 19.6 - 45.3 %    Monocyte % 2.0 (L) 5.0 - 12.0 %    Neutrophils Absolute 4.63 1.70 - 7.00 10*3/mm3    Lymphocytes Absolute 0.31 (L) 0.70 - 3.10 10*3/mm3    Monocytes Absolute 0.10 0.10 - 0.90 10*3/mm3    Anisocytosis Slight/1+ None Seen    Microcytes Slight/1+ None Seen    WBC Morphology Normal Normal    Platelet Morphology Normal Normal       Ordered the above labs and reviewed the results.        RADIOLOGY  Ct Chest Without Contrast    Result Date: 1/15/2020  CT OF THE CHEST WITHOUT CONTRAST 01/15/2020  HISTORY: Cough, fever.  TECHNIQUE: Axial images were obtained from the lung apices to the upper abdomen. No intravenous contrast was given.  FINDINGS: In the right upper lobe there is a moderately large area of consolidation containing air bronchograms with some scattered smaller areas of increased density.  In the right lower lobe there is a  moderately large area of consolidation with air bronchograms as well.  There is evidence of previous right diaphragmatic repair but there still is a small diaphragmatic hernia with a small focus of liver herniating into the lower chest. This is best seen on the sagittal reconstruction images with this small focus of herniated liver measuring approximately 2.4 cm. In the left lower lobe there is some mild patchy atelectasis or pneumonia.  Shotty mediastinal nodes are seen. Small amount of fluid is seen in pericardial recesses. Small calcified left hilar region lymph node is seen.      1. Moderately severe areas of dense pneumonia involving the right upper lobe and right lower lobe and minimally in the left lower lobe. 2. There is evidence of previous right diaphragm repair with a right diaphragmatic hernia containing a small portion of the liver as discussed above. 3. Short-term follow-up CT of the chest in approximately 2 months recommended. 4. Findings were discussed with Dr. Ceballos.  Radiation dose reduction techniques were utilized, including automated exposure control and exposure modulation based on body size.         Ordered the above noted radiological studies. Reviewed by me in PACS.        PROCEDURES  Procedures        EKG:           EKG time: 2100  Rhythm/Rate: SR 77  P waves and RI: Nml  QRS, axis: Nml axis   ST and T waves: No acute ischemic changes     Interpreted Contemporaneously by me, independently viewed  Similar compared to prior 11/27/2017            MEDICATIONS GIVEN IN ER  Medications   sodium chloride 0.9 % flush 10 mL (10 mL Intravenous Given 1/15/20 2230)   cefTRIAXone (ROCEPHIN) IVPB 1 g (1 g Intravenous New Bag 1/15/20 2225)   azithromycin (ZITHROMAX) 500 mg 0.9% NaCl (Add-vantage) 250 mL (has no administration in time range)   oseltamivir (TAMIFLU) capsule 75 mg (75 mg Oral Given 1/15/20 2225)   albuterol (PROVENTIL) nebulizer solution 0.083% 2.5 mg/3mL (2.5 mg Nebulization Given 1/15/20  2149)         MEDICAL DECISION MAKING and ED Course          2130: Rechecked pt who is resting comfortably. Informed pt on lab and imaging results. Discussed plan to admit pt for further evaluation and treatment. Pt understands and agrees with the plan, all questions answered.      MDM       54-year-old female who was diagnosed with the flu 3 days prior presents today with complaint of right-sided chest pain and shortness of air.  She has complex history involving her right hemidiaphragm and has had prior pleural effusions, diaphragmatic hernia, ectopic liver within the right hemithorax, therefore I proceeded directly to CT chest without contrast rather than obtaining chest x-ray.  This shows multiple areas of dense pneumonia throughout the right lung and also some in the left lung.  There is a small right diaphragmatic hernia with a small portion of liver herniating into the right hemithorax.  There is no associated pleural effusion.  Given her associated asthma and the progression of her symptoms today, she will be admitted and started on empiric antibiotics as well as oral Tamiflu.  Labs reviewed and are reassuring.  She has no leukocytosis but does have a left shift.  Serum lactate is pending.  Her EKG is without ischemic changes and cardiac troponin is negative.    DIAGNOSIS  Final diagnoses:   Multifocal pneumonia   Influenza A         DISPOSITION  ADMISSION    Discussed treatment plan and reason for admission with pt/family and admitting physician.  Pt/family voiced understanding of the plan for admission for further testing/treatment as needed.           Latest Documented Vital Signs:  As of 10:37 PM  BP- 115/52 HR- 74 Temp- (!) 100.8 °F (38.2 °C) (Tympanic) O2 sat- 92%        --  Documentation assistance provided by nikita Chavez for Dr. CLEVE Ceballos MD.  Information recorded by the nikita was done at my direction and has been verified and validated by me.      Please note that portions of  this were completed with a voice recognition program.            Liliya Chavez  01/15/20 2211       George Ceballos MD  01/15/20 6034       George Ceballos MD  01/15/20 9670

## 2020-01-16 NOTE — PLAN OF CARE
Problem: Patient Care Overview  Goal: Plan of Care Review  Outcome: Ongoing (interventions implemented as appropriate)  Flowsheets  Taken 1/16/2020 0257  Progress: no change  Taken 1/16/2020 0055  Plan of Care Reviewed With: patient  Note:   Pt admitted from ED with PNA and Flu. New orders per MD. O2 at 2L to maintain sats greater than 90%. Will continue to monitor pt.   Goal: Individualization and Mutuality  Outcome: Ongoing (interventions implemented as appropriate)  Goal: Discharge Needs Assessment  Outcome: Ongoing (interventions implemented as appropriate)  Goal: Interprofessional Rounds/Family Conf  Outcome: Ongoing (interventions implemented as appropriate)     Problem: Fall Risk (Adult)  Goal: Identify Related Risk Factors and Signs and Symptoms  Outcome: Ongoing (interventions implemented as appropriate)  Flowsheets (Taken 1/16/2020 0257)  Related Risk Factors (Fall Risk): history of falls; impaired vision; gait/mobility problems; environment unfamiliar  Signs and Symptoms (Fall Risk): presence of risk factors  Goal: Absence of Fall  Outcome: Ongoing (interventions implemented as appropriate)  Flowsheets (Taken 1/16/2020 0257)  Absence of Fall: making progress toward outcome     Problem: Pain, Chronic (Adult)  Goal: Identify Related Risk Factors and Signs and Symptoms  Outcome: Ongoing (interventions implemented as appropriate)  Flowsheets (Taken 1/16/2020 0257)  Related Risk Factors (Chronic Pain): knowledge deficit; disease process  Signs and Symptoms (Chronic Pain): fatigue/weakness; sleep pattern change; verbalization of pain descriptors  Goal: Acceptable Pain/Comfort Level and Functional Ability  Outcome: Ongoing (interventions implemented as appropriate)  Flowsheets (Taken 1/16/2020 0257)  Acceptable Pain/Comfort Level and Functional Ability: making progress toward outcome     Problem: Infection, Risk/Actual (Adult)  Goal: Identify Related Risk Factors and Signs and Symptoms  Outcome: Ongoing  (interventions implemented as appropriate)  Flowsheets (Taken 1/16/2020 0257)  Related Risk Factors (Infection, Risk/Actual): chronic illness/condition; sleep disturbance; medication effects  Signs and Symptoms (Infection, Risk/Actual): body temperature changes; feeding/eating intolerance; pain; nausea; weakness  Goal: Infection Prevention/Resolution  Outcome: Ongoing (interventions implemented as appropriate)  Flowsheets (Taken 1/16/2020 0257)  Infection Prevention/Resolution: making progress toward outcome

## 2020-01-16 NOTE — ED NOTES
Pt to triage with c/o soa and right lung pain - states diagnosed with flu Varun night, given xofluza Monday, but today has had soa.       Quyen Contreras RN  01/15/20 2031

## 2020-01-17 ENCOUNTER — APPOINTMENT (OUTPATIENT)
Dept: CARDIOLOGY | Facility: HOSPITAL | Age: 55
End: 2020-01-17

## 2020-01-17 ENCOUNTER — TELEPHONE (OUTPATIENT)
Dept: SLEEP MEDICINE | Facility: HOSPITAL | Age: 55
End: 2020-01-17

## 2020-01-17 ENCOUNTER — APPOINTMENT (OUTPATIENT)
Dept: GENERAL RADIOLOGY | Facility: HOSPITAL | Age: 55
End: 2020-01-17

## 2020-01-17 LAB
ANION GAP SERPL CALCULATED.3IONS-SCNC: 11.9 MMOL/L (ref 5–15)
AORTIC DIMENSIONLESS INDEX: 0.8 (DI)
BASOPHILS # BLD AUTO: 0.01 10*3/MM3 (ref 0–0.2)
BASOPHILS NFR BLD AUTO: 0.3 % (ref 0–1.5)
BH CV ECHO MEAS - AO MAX PG (FULL): 1.3 MMHG
BH CV ECHO MEAS - AO MAX PG: 4.8 MMHG
BH CV ECHO MEAS - AO MEAN PG (FULL): 1 MMHG
BH CV ECHO MEAS - AO MEAN PG: 3 MMHG
BH CV ECHO MEAS - AO ROOT AREA (BSA CORRECTED): 1.7
BH CV ECHO MEAS - AO ROOT AREA: 8 CM^2
BH CV ECHO MEAS - AO ROOT DIAM: 3.2 CM
BH CV ECHO MEAS - AO V2 MAX: 109 CM/SEC
BH CV ECHO MEAS - AO V2 MEAN: 77.6 CM/SEC
BH CV ECHO MEAS - AO V2 VTI: 26.1 CM
BH CV ECHO MEAS - AVA(I,A): 3.1 CM^2
BH CV ECHO MEAS - AVA(I,D): 3.1 CM^2
BH CV ECHO MEAS - AVA(V,A): 3.3 CM^2
BH CV ECHO MEAS - AVA(V,D): 3.3 CM^2
BH CV ECHO MEAS - BSA(HAYCOCK): 2 M^2
BH CV ECHO MEAS - BSA: 1.9 M^2
BH CV ECHO MEAS - BZI_BMI: 32.4 KILOGRAMS/M^2
BH CV ECHO MEAS - BZI_METRIC_HEIGHT: 163 CM
BH CV ECHO MEAS - BZI_METRIC_WEIGHT: 86 KG
BH CV ECHO MEAS - EDV(CUBED): 110.6 ML
BH CV ECHO MEAS - EDV(MOD-SP2): 102 ML
BH CV ECHO MEAS - EDV(MOD-SP4): 119 ML
BH CV ECHO MEAS - EDV(TEICH): 107.5 ML
BH CV ECHO MEAS - EF(CUBED): 61.2 %
BH CV ECHO MEAS - EF(MOD-BP): 56 %
BH CV ECHO MEAS - EF(MOD-SP2): 56.9 %
BH CV ECHO MEAS - EF(MOD-SP4): 55.5 %
BH CV ECHO MEAS - EF(TEICH): 52.7 %
BH CV ECHO MEAS - ESV(CUBED): 42.9 ML
BH CV ECHO MEAS - ESV(MOD-SP2): 44 ML
BH CV ECHO MEAS - ESV(MOD-SP4): 53 ML
BH CV ECHO MEAS - ESV(TEICH): 50.9 ML
BH CV ECHO MEAS - FS: 27.1 %
BH CV ECHO MEAS - IVS/LVPW: 1.1
BH CV ECHO MEAS - IVSD: 1 CM
BH CV ECHO MEAS - LAT PEAK E' VEL: 12.1 CM/SEC
BH CV ECHO MEAS - LV DIASTOLIC VOL/BSA (35-75): 62.1 ML/M^2
BH CV ECHO MEAS - LV MASS(C)D: 158.8 GRAMS
BH CV ECHO MEAS - LV MASS(C)DI: 82.9 GRAMS/M^2
BH CV ECHO MEAS - LV MAX PG: 3.5 MMHG
BH CV ECHO MEAS - LV MEAN PG: 2 MMHG
BH CV ECHO MEAS - LV SYSTOLIC VOL/BSA (12-30): 27.7 ML/M^2
BH CV ECHO MEAS - LV V1 MAX: 93.3 CM/SEC
BH CV ECHO MEAS - LV V1 MEAN: 62.9 CM/SEC
BH CV ECHO MEAS - LV V1 VTI: 21.4 CM
BH CV ECHO MEAS - LVIDD: 4.8 CM
BH CV ECHO MEAS - LVIDS: 3.5 CM
BH CV ECHO MEAS - LVLD AP2: 7.8 CM
BH CV ECHO MEAS - LVLD AP4: 8 CM
BH CV ECHO MEAS - LVLS AP2: 6.5 CM
BH CV ECHO MEAS - LVLS AP4: 6.3 CM
BH CV ECHO MEAS - LVOT AREA (M): 3.8 CM^2
BH CV ECHO MEAS - LVOT AREA: 3.8 CM^2
BH CV ECHO MEAS - LVOT DIAM: 2.2 CM
BH CV ECHO MEAS - LVPWD: 0.9 CM
BH CV ECHO MEAS - MED PEAK E' VEL: 8.36 CM/SEC
BH CV ECHO MEAS - MV A DUR: 151 SEC
BH CV ECHO MEAS - MV A MAX VEL: 65 CM/SEC
BH CV ECHO MEAS - MV DEC SLOPE: 326 CM/SEC^2
BH CV ECHO MEAS - MV DEC TIME: 187 SEC
BH CV ECHO MEAS - MV E MAX VEL: 107.5 CM/SEC
BH CV ECHO MEAS - MV E/A: 1.7
BH CV ECHO MEAS - MV MAX PG: 5.1 MMHG
BH CV ECHO MEAS - MV MEAN PG: 2 MMHG
BH CV ECHO MEAS - MV P1/2T MAX VEL: 117 CM/SEC
BH CV ECHO MEAS - MV P1/2T: 105.1 MSEC
BH CV ECHO MEAS - MV V2 MAX: 113 CM/SEC
BH CV ECHO MEAS - MV V2 MEAN: 57.2 CM/SEC
BH CV ECHO MEAS - MV V2 VTI: 32.3 CM
BH CV ECHO MEAS - MVA P1/2T LCG: 1.9 CM^2
BH CV ECHO MEAS - MVA(P1/2T): 2.1 CM^2
BH CV ECHO MEAS - MVA(VTI): 2.5 CM^2
BH CV ECHO MEAS - PA ACC TIME: 0.09 SEC
BH CV ECHO MEAS - PA MAX PG (FULL): 1 MMHG
BH CV ECHO MEAS - PA MAX PG: 2.2 MMHG
BH CV ECHO MEAS - PA PR(ACCEL): 37.6 MMHG
BH CV ECHO MEAS - PA V2 MAX: 74.7 CM/SEC
BH CV ECHO MEAS - PVA(V,A): 2.1 CM^2
BH CV ECHO MEAS - PVA(V,D): 2.1 CM^2
BH CV ECHO MEAS - QP/QS: 0.47
BH CV ECHO MEAS - RAP SYSTOLE: 8 MMHG
BH CV ECHO MEAS - RV MAX PG: 1.2 MMHG
BH CV ECHO MEAS - RV MEAN PG: 1 MMHG
BH CV ECHO MEAS - RV V1 MAX: 54.8 CM/SEC
BH CV ECHO MEAS - RV V1 MEAN: 36.9 CM/SEC
BH CV ECHO MEAS - RV V1 VTI: 13.4 CM
BH CV ECHO MEAS - RVOT AREA: 2.8 CM^2
BH CV ECHO MEAS - RVOT DIAM: 1.9 CM
BH CV ECHO MEAS - RVSP: 28 MMHG
BH CV ECHO MEAS - SI(AO): 109.6 ML/M^2
BH CV ECHO MEAS - SI(CUBED): 35.3 ML/M^2
BH CV ECHO MEAS - SI(LVOT): 42.5 ML/M^2
BH CV ECHO MEAS - SI(MOD-SP2): 30.3 ML/M^2
BH CV ECHO MEAS - SI(MOD-SP4): 34.4 ML/M^2
BH CV ECHO MEAS - SI(TEICH): 29.6 ML/M^2
BH CV ECHO MEAS - SV(AO): 209.9 ML
BH CV ECHO MEAS - SV(CUBED): 67.7 ML
BH CV ECHO MEAS - SV(LVOT): 81.3 ML
BH CV ECHO MEAS - SV(MOD-SP2): 58 ML
BH CV ECHO MEAS - SV(MOD-SP4): 66 ML
BH CV ECHO MEAS - SV(RVOT): 38 ML
BH CV ECHO MEAS - SV(TEICH): 56.7 ML
BH CV ECHO MEAS - TAPSE (>1.6): 2.2 CM2
BH CV ECHO MEAS - TR MAX PG: 20 MMHG
BH CV ECHO MEAS - TR MAX VEL: 226 CM/SEC
BH CV ECHO MEASUREMENTS AVERAGE E/E' RATIO: 10.51
BH CV XLRA - RV BASE: 3.39 CM
BH CV XLRA - TDI S': 17.9 CM/SEC
BUN BLD-MCNC: 21 MG/DL (ref 6–20)
BUN/CREAT SERPL: 28 (ref 7–25)
CALCIUM SPEC-SCNC: 8.6 MG/DL (ref 8.6–10.5)
CHLORIDE SERPL-SCNC: 107 MMOL/L (ref 98–107)
CO2 SERPL-SCNC: 20.1 MMOL/L (ref 22–29)
CREAT BLD-MCNC: 0.75 MG/DL (ref 0.57–1)
DEPRECATED RDW RBC AUTO: 38.5 FL (ref 37–54)
EOSINOPHIL # BLD AUTO: 0.01 10*3/MM3 (ref 0–0.4)
EOSINOPHIL NFR BLD AUTO: 0.3 % (ref 0.3–6.2)
ERYTHROCYTE [DISTWIDTH] IN BLOOD BY AUTOMATED COUNT: 12.6 % (ref 12.3–15.4)
GFR SERPL CREATININE-BSD FRML MDRD: 81 ML/MIN/1.73
GLUCOSE BLD-MCNC: 101 MG/DL (ref 65–99)
HCT VFR BLD AUTO: 27.7 % (ref 34–46.6)
HGB BLD-MCNC: 9.7 G/DL (ref 12–15.9)
LEFT ATRIUM VOLUME INDEX: 29.9 ML/M2
LV EF 2D ECHO EST: 56 %
LYMPHOCYTES # BLD AUTO: 0.91 10*3/MM3 (ref 0.7–3.1)
LYMPHOCYTES NFR BLD AUTO: 25.7 % (ref 19.6–45.3)
MAXIMAL PREDICTED HEART RATE: 166 BPM
MCH RBC QN AUTO: 29.8 PG (ref 26.6–33)
MCHC RBC AUTO-ENTMCNC: 35 G/DL (ref 31.5–35.7)
MCV RBC AUTO: 85 FL (ref 79–97)
MONOCYTES # BLD AUTO: 0.25 10*3/MM3 (ref 0.1–0.9)
MONOCYTES NFR BLD AUTO: 7.1 % (ref 5–12)
NEUTROPHILS # BLD AUTO: 2.35 10*3/MM3 (ref 1.7–7)
NEUTROPHILS NFR BLD AUTO: 66.3 % (ref 42.7–76)
PLATELET # BLD AUTO: 147 10*3/MM3 (ref 140–450)
PMV BLD AUTO: 9.7 FL (ref 6–12)
POTASSIUM BLD-SCNC: 4 MMOL/L (ref 3.5–5.2)
RBC # BLD AUTO: 3.26 10*6/MM3 (ref 3.77–5.28)
SODIUM BLD-SCNC: 139 MMOL/L (ref 136–145)
STRESS TARGET HR: 141 BPM
WBC NRBC COR # BLD: 3.54 10*3/MM3 (ref 3.4–10.8)

## 2020-01-17 PROCEDURE — 80048 BASIC METABOLIC PNL TOTAL CA: CPT | Performed by: INTERNAL MEDICINE

## 2020-01-17 PROCEDURE — 25010000002 CEFTRIAXONE PER 250 MG: Performed by: INTERNAL MEDICINE

## 2020-01-17 PROCEDURE — 94799 UNLISTED PULMONARY SVC/PX: CPT

## 2020-01-17 PROCEDURE — 93306 TTE W/DOPPLER COMPLETE: CPT | Performed by: INTERNAL MEDICINE

## 2020-01-17 PROCEDURE — 71045 X-RAY EXAM CHEST 1 VIEW: CPT

## 2020-01-17 PROCEDURE — 25010000002 ENOXAPARIN PER 10 MG: Performed by: INTERNAL MEDICINE

## 2020-01-17 PROCEDURE — 94618 PULMONARY STRESS TESTING: CPT

## 2020-01-17 PROCEDURE — 85025 COMPLETE CBC W/AUTO DIFF WBC: CPT | Performed by: INTERNAL MEDICINE

## 2020-01-17 PROCEDURE — 25010000002 PERFLUTREN (DEFINITY) 8.476 MG IN SODIUM CHLORIDE 0.9 % 10 ML INJECTION: Performed by: INTERNAL MEDICINE

## 2020-01-17 PROCEDURE — 25010000002 ONDANSETRON PER 1 MG: Performed by: INTERNAL MEDICINE

## 2020-01-17 PROCEDURE — 93306 TTE W/DOPPLER COMPLETE: CPT

## 2020-01-17 RX ORDER — DOXYCYCLINE 100 MG/1
100 CAPSULE ORAL EVERY 12 HOURS SCHEDULED
Status: DISCONTINUED | OUTPATIENT
Start: 2020-01-17 | End: 2020-01-20 | Stop reason: HOSPADM

## 2020-01-17 RX ADMIN — PANTOPRAZOLE SODIUM 40 MG: 40 TABLET, DELAYED RELEASE ORAL at 16:50

## 2020-01-17 RX ADMIN — DOXYCYCLINE 100 MG: 100 INJECTION, POWDER, LYOPHILIZED, FOR SOLUTION INTRAVENOUS at 09:10

## 2020-01-17 RX ADMIN — PERFLUTREN 2 ML: 6.52 INJECTION, SUSPENSION INTRAVENOUS at 13:50

## 2020-01-17 RX ADMIN — DEXTROMETHORPHAN HYDROBROMIDE AND PROMETHAZINE HYDROCHLORIDE 7.5 ML: 15; 6.25 SOLUTION ORAL at 20:14

## 2020-01-17 RX ADMIN — OSELTAMIVIR PHOSPHATE 75 MG: 75 CAPSULE ORAL at 09:09

## 2020-01-17 RX ADMIN — GABAPENTIN 400 MG: 400 CAPSULE ORAL at 16:50

## 2020-01-17 RX ADMIN — ONDANSETRON 4 MG: 2 INJECTION INTRAMUSCULAR; INTRAVENOUS at 12:40

## 2020-01-17 RX ADMIN — DEXTROMETHORPHAN HYDROBROMIDE AND PROMETHAZINE HYDROCHLORIDE 7.5 ML: 15; 6.25 SOLUTION ORAL at 15:41

## 2020-01-17 RX ADMIN — BUPROPION HYDROCHLORIDE 300 MG: 300 TABLET, EXTENDED RELEASE ORAL at 16:50

## 2020-01-17 RX ADMIN — GABAPENTIN 400 MG: 400 CAPSULE ORAL at 20:12

## 2020-01-17 RX ADMIN — TRAZODONE HYDROCHLORIDE 200 MG: 100 TABLET ORAL at 20:13

## 2020-01-17 RX ADMIN — ENOXAPARIN SODIUM 40 MG: 40 INJECTION SUBCUTANEOUS at 09:10

## 2020-01-17 RX ADMIN — OSELTAMIVIR PHOSPHATE 75 MG: 75 CAPSULE ORAL at 20:57

## 2020-01-17 RX ADMIN — CEFTRIAXONE SODIUM 1 G: 1 INJECTION, SOLUTION INTRAVENOUS at 14:52

## 2020-01-17 RX ADMIN — FLUOXETINE HYDROCHLORIDE 60 MG: 20 CAPSULE ORAL at 20:13

## 2020-01-17 RX ADMIN — ALBUTEROL SULFATE 2.5 MG: 2.5 SOLUTION RESPIRATORY (INHALATION) at 02:25

## 2020-01-17 RX ADMIN — SODIUM CHLORIDE 75 ML/HR: 9 INJECTION, SOLUTION INTRAVENOUS at 02:00

## 2020-01-17 RX ADMIN — DOXYCYCLINE 100 MG: 100 CAPSULE ORAL at 20:13

## 2020-01-17 RX ADMIN — SODIUM CHLORIDE, PRESERVATIVE FREE 10 ML: 5 INJECTION INTRAVENOUS at 20:21

## 2020-01-17 RX ADMIN — DEXTROMETHORPHAN HYDROBROMIDE AND PROMETHAZINE HYDROCHLORIDE 7.5 ML: 15; 6.25 SOLUTION ORAL at 02:02

## 2020-01-17 RX ADMIN — GABAPENTIN 400 MG: 400 CAPSULE ORAL at 09:09

## 2020-01-17 NOTE — NURSING NOTE
Patient with frequent PAC's and 11 beat run of ST. Chest pain/ chest discomfort. DR. Rosenberg notified

## 2020-01-17 NOTE — PROGRESS NOTES
Exercise Oximetry    Patient Name:Machelle Ortega   MRN: [id number]   Date: 01/17/20             ROOM AIR BASELINE   SpO2% 95   Heart Rate 71   Blood Pressure 101/61     EXERCISE ON ROOM AIR SpO2% EXERCISE ON O2 @ LPM SpO2%   1 MINUTE 96 1 MINUTE    2 MINUTES 95 2 MINUTES    3 MINUTES 95 3 MINUTES    4 MINUTES 95 4 MINUTES    5 MINUTES 95 5 MINUTES    6 MINUTES 95 6 MINUTES               Distance Walked  480 Distance Walked   Dyspnea (Barb Scale)  2 Dyspnea (Barb Scale)   Fatigue (Barb Scale)  2 Fatigue (Barb Scale)   SpO2% Post Exercise  95 SpO2% Post Exercise   HR Post Exercise  82 HR Post Exercise   Time to Recovery  3 minutes Time to Recovery     Comments: Patient was on 4lpm, 02 was taken off and patient placed on room air. After 8 minutes off 02 walking oximetry was attempted. Forehead probe was used during walk for accurate reading. Patient walked for 6 minutes and saturation was 95-96% during walk. Patient had some balance issues but stated that is not uncommon for her. Returned to room and left patient on room air.

## 2020-01-17 NOTE — PLAN OF CARE
Problem: Patient Care Overview  Goal: Plan of Care Review  Flowsheets  Taken 1/16/2020 1758 by Adelita Gonzalez, RN  Progress: improving  Taken 1/16/2020 2000 by Meeta Chatman, RN  Plan of Care Reviewed With: patient  Taken 1/17/2020 0315 by Meeta Chatman, RN  Outcome Summary: using cpap and then oxygen at 4 literswhen off cpap coughing quite a bit and unable to sleep cough med give also complains of soa breathing treatment requested and given with good results able to rest and is breathing easier sats 91-92 on cpap no oxygen bled at tis time c/o nausea x1 passed quickly and refused nausea med

## 2020-01-17 NOTE — PAYOR COMM NOTE
"P: [phone]  F: [phone]    INITIAL CLINICAL     REF #[id number]    STARTED OBS, CHANGED TO INPATIENT 1/17/2020        Machelle Ortega (54 y.o. Female)     Date of Birth Social Security Number Address Home Phone MRN    1965  [id number] [id number] [phone] [id number]    [employer] Marital Status          Quaker        Admission Date Admission Type Admitting Provider Attending Provider Department, Room/Bed    1/15/20 Emergency Cecil Villalba MD Aggarwal, Nisha, MD [address], [address]/1    Discharge Date Discharge Disposition Discharge Destination                       Attending Provider:  Vera Rosenberg MD    Allergies:  Budesonide-formoterol Fumarate, Oxycodone-acetaminophen, Pregabalin    Isolation:  Droplet   Infection:  Influenza (01/16/20)   Code Status:  CPR    Ht:  163.8 cm (64.49\")   Wt:  86.1 kg (189 lb 12.8 oz)    Admission Cmt:  None   Principal Problem:  Multifocal pneumonia [J18.9]                 Active Insurance as of 1/15/2020     Primary Coverage     Payor Plan Insurance Group Employer/Plan Group    ANTHEM BLUE CROSS ANTHEM BLUE CROSS BLUE SHIELD PPO [id number]     Payor Plan Address Payor Plan Phone Number Payor Plan Fax Number Effective Dates    [address] [phone]  1/1/2020 - None Entered    [facility] [id number]       Subscriber Name Subscriber Birth Date Member ID       DYLON ORTEGA [birth year]/1962 [id number]           Secondary Coverage     Payor Plan Insurance Group Employer/Plan Group    MEDICARE MEDICARE A & B      Payor Plan Address Payor Plan Phone Number Payor Plan Fax Number Effective Dates    [address] [phone]  9/1/2008 - None Entered    [facility] [id number]       Subscriber Name Subscriber Birth Date Member ID       MACHELLE ORTEGA 1965 [id number]                 Emergency Contacts      (Rel.) Home Phone Work Phone Mobile Phone    Dylon Ortega (Spouse) [phone] -- [phone]      "            History & Physical      Cecil Villalba MD at 01/15/20 2332              Patient Name:  Machelle Ortega  YOB: 1965  MRN:  [id number]  Admit Date:  1/15/2020  Patient Care Team:  Iqra Powell MD as PCP - General (Internal Medicine)  Hailey Maguire APRN as PCP - Claims Attributed  Antionette Herndon MD as Consulting Physician (Obstetrics and Gynecology)  Andrew Hopkins III, MD as Surgeon (Thoracic Surgery)  Dayron Jarrett MD as Surgeon (Neurosurgery)  Vitaliy Schafer MD as Consulting Physician (Pulmonary Disease)  Mohit Natarajan MD as Surgeon (General Surgery)  Vicente Cheng MD as Consulting Physician (Pulmonary Disease)  Ranjit London MD as Consulting Physician (Pain Medicine)  Fatimah Manley MD as Consulting Physician (Orthopedic Surgery)  Neftali Nayak MD as Consulting Physician (Physical Medicine and Rehabilitation)      Subjective   History Present Illness     Chief Complaint   Patient presents with   • Shortness of Breath       Ms. Ortega is a 54 y.o. 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 Benjamin with also recurrence of her diaphragmatic hernia.  She presented with symptoms of cough.  On Sunday she was diagnosed with the flu as an outpatient and started on Delflusa.  The patient has not been improving and has had persistent productive cough as well as fevers chills, nausea and vomiting and right-sided pleuritic chest pain.  She presented to [facility] ER and had CT scan which had showed significant pneumonia.  She was given IV antibiotics in the ER.    History of Present Illness  Review of Systems   Constitutional: Positive for chills and fever.   HENT: Positive for congestion. Negative for dental problem.    Eyes: Negative.    Respiratory: Positive for cough, chest tightness and shortness of breath.    Cardiovascular: Negative for  palpitations and leg swelling.   Gastrointestinal: Positive for nausea and vomiting.   Endocrine: Negative.    Genitourinary: Negative.    Musculoskeletal: Negative.    Skin: Negative.    Allergic/Immunologic: Negative.    Neurological: Negative.    Hematological: Negative.    Psychiatric/Behavioral: Negative.         Personal History     Past Medical History:   Diagnosis Date   • Anxiety    • Arthritis    • Asthma Seasonally   • Attention deficit hyperactivity disorder    • Balance problem    • Chronic pain    • Claustrophobia    • DDD (degenerative disc disease), lumbar    • Depression    • Fatigue    • Fibromyalgia    • Fibromyositis    • GERD (gastroesophageal reflux disease)    • History of mononucleosis    • Hyperlipidemia    • IBS (irritable bowel syndrome)    • Iron deficiency anemia    • Liver anomaly, congenital     ectopic liver   • Lumbago    • Mood disorder (CMS/HCC)    • Sleep apnea     Compliant w/ C-Pap     Past Surgical History:   Procedure Laterality Date   • APPENDECTOMY      Emergency performed by Darnell   • BLADDER NECK RECONSTRUCTION N/A    • BREAST BIOPSY  Several    On both sides   • BREAST CYST ASPIRATION     • BREAST CYST ASPIRATION     • BRONCHOSCOPY N/A 2014    Chronic cough with abnormal chest x-ray, the exam was normal, fluoroscopically guided transbronchial brushings were obtained, transbronchial lung biopsies were performed, bronchoalveolar lavage was performed-Dr. Vitaliy Schafer   • CERVICAL CORPECTOMY N/A 2014    Anterior cervical corpectomy at C4 and C5; Anterior cervical arthrodesis at C3-C4, C4-C5 and C5-C6; placement of instrumentation; use of intraoperative microscope for microdissection; harvest of vertebral body autograft through same incision; prep of morcellized allograft-Dr. Dayron Jarrett   •  SECTION N/A 2007    Dr. Briana Mcdaniel   • COLONOSCOPY N/A 3/22/2017    Procedure: COLONOSCOPY TO CECUM ;  Surgeon: Mohit Natarajan MD;  Location: [facility]  ENDOSCOPY;  Service:    • D&C HYSTEROSCOPY N/A 12/05/2005    Hysteroscopy, D&C, and paracervical block-Dr. Antionette Herndon   • D&C HYSTEROSCOPY ENDOMETRIAL ABLATION N/A 09/15/2014    Hysteroscopy, D&C, NovaSure ablation-Dr. Antionette Herndon   • ENDOSCOPY N/A 3/22/2017    Procedure: ESOPHAGOGASTRODUODENOSCOPY WITH BX ;  Surgeon: Mohit Natarajan MD;  Location: [facility] ENDOSCOPY;  Service:    • EYE SURGERY  1996    Lasik   • LIVER SURGERY     • LUMBAR SPINAL TUMOR REMOVAL N/A     spinal tumor 2006  T10/11 schwannoma   • LUNG LOBECTOMY Right 06/26/2014    Exploratory bronchoscopy, exploratory right video-assisted thoracoscopy, parietal pleural biopsy, diaphragmatic biopsy, wedge resection of right lower lobe, excision of ectopic hepatic lobe, primary repair of diagphragm, subpleural pain catheters x2, mechanical pleurodesis-Dr. Darryl Benjamin   • SPINAL CORD STIMULATOR IMPLANT      TRIAL   • SPINAL CORD STIMULATOR IMPLANT Bilateral 12/28/2017    Procedure: SPINAL CORD STIMULATOR INSERTION PHASE 1,  laminotomy for placement of [employer] lead  from T9 to T6;  Surgeon: Dayron Jarrett MD;  Location: [facility] OR;  Service:    • SPINAL CORD STIMULATOR IMPLANT N/A 12/29/2017    Procedure: SPINAL CORD STIMULATOR INSERTION PHASE 2;  Surgeon: Dayron Jarrett MD;  Location: [facility] MAIN OR;  Service:      Family History   Problem Relation Age of Onset   • Alcohol abuse Other    • Arthritis Other    • Diabetes Other    • Drug abuse Other    • Hepatitis Other    • Hypertension Other    • Depression Mother    • Lung cancer Mother    • Thyroid disease Mother    • Arthritis Mother    • Cancer Mother         Lung cancer 2016   • Diabetes Mother    • Hyperlipidemia Mother    • Hypertension Mother    • Miscarriages / Stillbirths Mother    • Colon polyps Mother    • Hashimoto's thyroiditis Daughter    • Breast cancer Maternal Aunt    • Prostate cancer Maternal Uncle    • Cancer Maternal Uncle    • Heart disease Paternal  Grandmother    • Alcohol abuse Paternal Grandmother    • Asthma Paternal Grandmother    • Depression Paternal Grandmother    • Alzheimer's disease Paternal Grandfather    • Alcohol abuse Paternal Grandfather    • Depression Paternal Grandfather    • Alcohol abuse Brother    • Arthritis Brother    • Depression Brother    • Drug abuse Brother         Marijuana for chronic pain   • Learning disabilities Brother         ADHD   • Arthritis Maternal Grandmother    • Depression Maternal Grandmother    • Diabetes Maternal Grandmother    • Heart disease Maternal Grandmother    • Hyperlipidemia Maternal Grandmother    • Hypertension Maternal Grandmother    • Birth defects Maternal Uncle         CP   • Hyperlipidemia Maternal Uncle    • Cancer Maternal Grandfather         Kidney, bone cancer 1980's   • Cancer Maternal Aunt         Breast cancer 2003   • Depression Maternal Aunt    • Miscarriages / Stillbirths Maternal Aunt    • Cancer Maternal Aunt         Lung cancer 2015   • Depression Maternal Aunt    • Diabetes Maternal Aunt    • Cancer Maternal Uncle         Prostate Cancer   • Cancer Maternal Uncle         Thymus and Pancreatic Cancer   • Depression Father    • Diabetes Father    • Hyperlipidemia Father    • Colon polyps Father    • Depression Daughter    • Learning disabilities Daughter         ADHD   • Diabetes Maternal Aunt         Pancreatic Insulinoma removed   • Learning disabilities Son         ADHD, SPD   • Malig Hyperthermia Neg Hx      Social History     Tobacco Use   • Smoking status: Never Smoker   • Smokeless tobacco: Never Used   Substance Use Topics   • Alcohol use: Yes     Comment: Socially on holidays   • Drug use: Yes     Types: Other     Comment: Gababentin.Lamictal   home meds reviewed and documented in ER    (Not in a hospital admission)  Allergies:    Allergies   Allergen Reactions   • Budesonide-Formoterol Fumarate Swelling     Throat   • Oxycodone-Acetaminophen Hives   • Pregabalin Other (See  Comments)     Weight gain       Objective    Objective     Vital Signs  Temp:  [100.8 °F (38.2 °C)] 100.8 °F (38.2 °C)  Heart Rate:  [74-89] 76  Resp:  [16] 16  BP: (103-130)/(52-80) 103/54  SpO2:  [92 %-96 %] 96 %  on   ;   Device (Oxygen Therapy): room air   Body mass index is 33.8 kg/m².    Physical Exam   Constitutional: She is oriented to person, place, and time. She appears well-developed. She appears distressed (mildly ill).   HENT:   Head: Normocephalic and atraumatic.   Mouth/Throat: Oropharynx is clear and moist.   Eyes: Pupils are equal, round, and reactive to light. EOM are normal. No scleral icterus.   Neck: Normal range of motion. Neck supple.   Cardiovascular: Normal rate, regular rhythm and normal heart sounds.   No murmur heard.  Pulmonary/Chest: Effort normal. She has rales.   Abdominal: Soft. Bowel sounds are normal. She exhibits no distension. There is no tenderness.   Musculoskeletal: She exhibits no edema or deformity.   Neurological: She is alert and oriented to person, place, and time. No cranial nerve deficit.   Skin: Skin is warm and dry. No rash noted.   Psychiatric: She has a normal mood and affect. Her behavior is normal. Thought content normal.   Nursing note and vitals reviewed.      Results Review:  I reviewed the patient's new clinical results.  I reviewed the patient's new imaging results and agree with the interpretation.  I reviewed the patient's other test results and agree with the interpretation  I personally viewed and interpreted the patient's EKG/Telemetry data  Discussed with ED provider.    Lab Results (last 24 hours)     Procedure Component Value Units Date/Time    CBC & Differential [[id number]] Collected:  01/15/20 2112    Specimen:  Blood Updated:  01/15/20 2153    Narrative:       The following orders were created for panel order CBC & Differential.  Procedure                               Abnormality         Status                     ---------                                -----------         ------                     CBC Auto Differential[[id number]]        Normal              Final result                 Please view results for these tests on the individual orders.    Comprehensive Metabolic Panel [[id number]]  (Abnormal) Collected:  01/15/20 2112    Specimen:  Blood Updated:  01/15/20 2157     Glucose 108 mg/dL      BUN 26 mg/dL      Creatinine 1.14 mg/dL      Sodium 137 mmol/L      Potassium 3.3 mmol/L      Chloride 97 mmol/L      CO2 23.1 mmol/L      Calcium 9.7 mg/dL      Total Protein 7.6 g/dL      Albumin 4.00 g/dL      ALT (SGPT) 17 U/L      AST (SGOT) 31 U/L      Alkaline Phosphatase 77 U/L      Total Bilirubin 0.3 mg/dL      eGFR Non African Amer 50 mL/min/1.73      Globulin 3.6 gm/dL      A/G Ratio 1.1 g/dL      BUN/Creatinine Ratio 22.8     Anion Gap 16.9 mmol/L     Narrative:       GFR Normal >60  Chronic Kidney Disease <60  Kidney Failure <15      BNP [[id number]]  (Normal) Collected:  01/15/20 2112    Specimen:  Blood Updated:  01/15/20 2155     proBNP 86.0 pg/mL     Narrative:       Among patients with dyspnea, NT-proBNP is highly sensitive for the detection of acute congestive heart failure. In addition NT-proBNP of <300 pg/ml effectively rules out acute congestive heart failure with 99% negative predictive value.    Results may be falsely decreased if patient taking Biotin.      Troponin [[id number]]  (Normal) Collected:  01/15/20 2112    Specimen:  Blood Updated:  01/15/20 2157     Troponin T <0.010 ng/mL     Narrative:       Troponin T Reference Range:  <= 0.03 ng/mL-   Negative for AMI  >0.03 ng/mL-     Abnormal for myocardial necrosis.  Clinicians would have to utilize clinical acumen, EKG, Troponin and serial changes to determine if it is an Acute Myocardial Infarction or myocardial injury due to an underlying chronic condition.       Results may be falsely decreased if patient taking Biotin.      CBC Auto Differential [[id number]]  (Normal) Collected:   01/15/20 2112    Specimen:  Blood Updated:  01/15/20 2153     WBC 5.04 10*3/mm3      RBC 4.46 10*6/mm3      Hemoglobin 12.7 g/dL      Hematocrit 37.6 %      MCV 84.3 fL      MCH 28.5 pg      MCHC 33.8 g/dL      RDW 12.6 %      RDW-SD 38.6 fl      MPV 9.5 fL      Platelets 173 10*3/mm3     Manual Differential [[id number]]  (Abnormal) Collected:  01/15/20 2112    Specimen:  Blood Updated:  01/15/20 2153     Neutrophil % 91.9 %      Lymphocyte % 6.1 %      Monocyte % 2.0 %      Neutrophils Absolute 4.63 10*3/mm3      Lymphocytes Absolute 0.31 10*3/mm3      Monocytes Absolute 0.10 10*3/mm3      Anisocytosis Slight/1+     Microcytes Slight/1+     WBC Morphology Normal     Platelet Morphology Normal    Lactic Acid, Plasma [[id number]]  (Normal) Collected:  01/15/20 2216    Specimen:  Blood Updated:  01/15/20 2303     Lactate 1.5 mmol/L     Blood Culture - Blood, Arm, Left [[id number]] Collected:  01/15/20 2217    Specimen:  Blood from Arm, Left Updated:  01/15/20 2239    Blood Culture - Blood, Arm, Right [[id number]] Collected:  01/15/20 2223    Specimen:  Blood from Arm, Right Updated:  01/15/20 2240          Imaging Results (Last 24 Hours)     Procedure Component Value Units Date/Time    CT Chest Without Contrast [[id number]] Collected:  01/15/20 2201     Updated:  01/15/20 2201    Narrative:       CT OF THE CHEST WITHOUT CONTRAST 01/15/2020     HISTORY: Cough, fever.     TECHNIQUE: Axial images were obtained from the lung apices to the upper  abdomen. No intravenous contrast was given.     FINDINGS: In the right upper lobe there is a moderately large area of  consolidation containing air bronchograms with some scattered smaller  areas of increased density.     In the right lower lobe there is a moderately large area of  consolidation with air bronchograms as well.     There is evidence of previous right diaphragmatic repair but there still  is a small diaphragmatic hernia with a small focus of liver herniating  into the  lower chest. This is best seen on the sagittal reconstruction  images with this small focus of herniated liver measuring approximately  2.4 cm. In the left lower lobe there is some mild patchy atelectasis or  pneumonia.     Shotty mediastinal nodes are seen. Small amount of fluid is seen in  pericardial recesses. Small calcified left hilar region lymph node is  seen.       Impression:       1. Moderately severe areas of dense pneumonia involving the right upper  lobe and right lower lobe and minimally in the left lower lobe.  2. There is evidence of previous right diaphragm repair with a right  diaphragmatic hernia containing a small portion of the liver as  discussed above.   3. Short-term follow-up CT of the chest in approximately 2 months  recommended.  4. Findings were discussed with Dr. Ceballos.     Radiation dose reduction techniques were utilized, including automated  exposure control and exposure modulation based on body size.                Results for orders placed in visit on 09/15/14   SCANNED - ECHOCARDIOGRAM       ECG 12 Lead   Preliminary Result   HEART RATE= 77  bpm   RR Interval= 780  ms   VA Interval= 130  ms   P Horizontal Axis= 17  deg   P Front Axis= 46  deg   QRSD Interval= 96  ms   QT Interval= 354  ms   QRS Axis= 21  deg   T Wave Axis= 28  deg   - NORMAL ECG -   Sinus rhythm   Electronically Signed By:    Date and Time of Study: 2020-01-15 21:00:53           Assessment/Plan     Active Hospital Problems    Diagnosis POA   • **Multifocal pneumonia [J18.9] Yes   • Influenza [J11.1] Yes   • Hypokalemia [E87.6] Yes   • Sleep apnea [G47.30] Yes   • GERD (gastroesophageal reflux disease) [K21.9] Yes     Patient with suspected secondary bacterial pneumonia after recent diagnosis of influenza.  Will give IV antibiotics and follow-up cultures.  Her situation is slightly more complicated with her previous diaphragmatic hernia repair though this has overall been stable.  Closely monitor.  See orders for  further details.      VTE Prophylaxis - Lovenox 40 mg SC daily.  Code Status - Full code.       Cecil Villalba MD  [facility]  01/15/20  11:38 PM      Electronically signed by Cecil Villalba MD at 01/15/20 2340          Emergency Department Notes      Quyen Contreras, RN at 01/15/20 2030        Pt to triage with c/o soa and right lung pain - states diagnosed with flu Sunday night, given xofluza Monday, but today has had soa.       Quyen Contreras RN  01/15/20 2031      Electronically signed by Quyen Contreras RN at 01/15/20 2031     George Ceballos MD at 01/15/20 2043           EMERGENCY DEPARTMENT ENCOUNTER    Room Number:  33/33  Date seen:  1/15/2020  Time seen: 8:43 PM  PCP: Iqra Powell MD  Historian: Pt      HPI:  Chief Complaint: Flu-like symptoms  A complete HPI/ROS/PMH/PSH/SH/FH are unobtainable due to: None  Context: Machelle Ortega is a 54 y.o. female who presents to the ED c/o gradual onset, constant, worsening flu symptoms over the last couple of days. She c/o R sided chest pain, generalized weakness, diarrhea, vomiting (resolved), productive cough and congestion. Pt was diagnosed with the influenza A 4 days ago. Pt has attempted to alleviated sx with unspecified cough syrup, inhalers, aleve and Xofluza. 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 Pt had surgery done at that time. No other complaints at this time.             PAST MEDICAL HISTORY  Active Ambulatory Problems     Diagnosis Date Noted   • Abdominal pain 06/07/2016   • Ataxic gait 06/07/2016   • Cervical pain 06/07/2016   • Chest pain, pleuritic 06/07/2016   • DDD (degenerative disc disease), cervical 06/07/2016   • DDD (degenerative disc disease), lumbar 06/07/2016   • Dry cough 06/07/2016   • Extremity pain 06/07/2016   • Low back pain 06/07/2016   • Chronic nausea  06/07/2016   • Loss of feeling or sensation 06/07/2016   • Pain in thoracic spine 06/07/2016   • Pleural cavity effusion 06/07/2016   • Breath shortness 06/07/2016   • Cervical spinal stenosis 06/07/2016   • IBS (irritable bowel syndrome) 06/07/2016   • GERD (gastroesophageal reflux disease) 06/07/2016   • Multiple joint complaints 06/07/2016   • ADD (attention deficit disorder) without hyperactivity 06/07/2016   • Fibromyalgia 06/07/2016   • Mood disorder (CMS/HCC) 06/07/2016   • Fatigue 06/07/2016   • Sleep apnea 06/07/2016   • Hyperlipidemia 06/07/2016   • History of benign schwannoma 01/09/2017   • Other cervical disc degeneration, high cervical region 01/09/2017   • Chronic pain 01/25/2017   • Postlaminectomy syndrome, thoracic 01/25/2017   • Diaphragmatic hernia without obstruction and without gangrene 02/27/2017   • Lumbar radiculopathy 07/17/2017   • History of spinal surgery 12/08/2017   • Neuropathic pain 12/28/2017   • Balance problem 10/03/2018   • Primary insomnia 10/03/2018     Resolved Ambulatory Problems     Diagnosis Date Noted   • No Resolved Ambulatory Problems     Past Medical History:   Diagnosis Date   • Anxiety    • Arthritis    • Asthma Seasonally   • Attention deficit hyperactivity disorder    • Claustrophobia    • Depression    • Fibromyositis    • History of mononucleosis    • Iron deficiency anemia    • Liver anomaly, congenital    • Lumbago          PAST SURGICAL HISTORY  Past Surgical History:   Procedure Laterality Date   • APPENDECTOMY  2014    Emergency performed by Darnell   • BLADDER NECK RECONSTRUCTION N/A    • BREAST BIOPSY  Several    On both sides   • BREAST CYST ASPIRATION     • BREAST CYST ASPIRATION     • BRONCHOSCOPY N/A 06/04/2014    Chronic cough with abnormal chest x-ray, the exam was normal, fluoroscopically guided transbronchial brushings were obtained, transbronchial lung biopsies were performed, bronchoalveolar lavage was performed-Dr. Vitaliy Schafer   • CERVICAL  CORPECTOMY N/A 2014    Anterior cervical corpectomy at C4 and C5; Anterior cervical arthrodesis at C3-C4, C4-C5 and C5-C6; placement of instrumentation; use of intraoperative microscope for microdissection; harvest of vertebral body autograft through same incision; prep of morcellized allograft-Dr. Dayron Jarrett   •  SECTION N/A 2007    Dr. Briana Mcdaniel   • COLONOSCOPY N/A 3/22/2017    Procedure: COLONOSCOPY TO CECUM ;  Surgeon: Mohit Natarajan MD;  Location: [facility] ENDOSCOPY;  Service:    • D&C HYSTEROSCOPY N/A 2005    Hysteroscopy, D&C, and paracervical block-Dr. Antionette Herndon   • D&C HYSTEROSCOPY ENDOMETRIAL ABLATION N/A 09/15/2014    Hysteroscopy, D&C, NovaSure ablation-Dr. Antionette Herndon   • ENDOSCOPY N/A 3/22/2017    Procedure: ESOPHAGOGASTRODUODENOSCOPY WITH BX ;  Surgeon: Mohit Natarajan MD;  Location: [facility] ENDOSCOPY;  Service:    • EYE SURGERY  1996    Lasik   • LIVER SURGERY     • LUMBAR SPINAL TUMOR REMOVAL N/A     spinal tumor   T10/11 schwannoma   • LUNG LOBECTOMY Right 2014    Exploratory bronchoscopy, exploratory right video-assisted thoracoscopy, parietal pleural biopsy, diaphragmatic biopsy, wedge resection of right lower lobe, excision of ectopic hepatic lobe, primary repair of diagphragm, subpleural pain catheters x2, mechanical pleurodesis-Dr. Darryl Benjamin   • SPINAL CORD STIMULATOR IMPLANT      TRIAL   • SPINAL CORD STIMULATOR IMPLANT Bilateral 2017    Procedure: SPINAL CORD STIMULATOR INSERTION PHASE 1,  laminotomy for placement of [place] [employer] lead  from T9 to T6;  Surgeon: Dayron Jarrett MD;  Location: [facility] OR;  Service:    • SPINAL CORD STIMULATOR IMPLANT N/A 2017    Procedure: SPINAL CORD STIMULATOR INSERTION PHASE 2;  Surgeon: Dayron Jarrett MD;  Location: [facility] MAIN OR;  Service:          FAMILY HISTORY  Family History   Problem Relation Age of Onset   • Alcohol abuse Other    • Arthritis Other    • Diabetes  Other    • Drug abuse Other    • Hepatitis Other    • Hypertension Other    • Depression Mother    • Lung cancer Mother    • Thyroid disease Mother    • Arthritis Mother    • Cancer Mother         Lung cancer 2016   • Diabetes Mother    • Hyperlipidemia Mother    • Hypertension Mother    • Miscarriages / Stillbirths Mother    • Colon polyps Mother    • Hashimoto's thyroiditis Daughter    • Breast cancer Maternal Aunt    • Prostate cancer Maternal Uncle    • Cancer Maternal Uncle    • Heart disease Paternal Grandmother    • Alcohol abuse Paternal Grandmother    • Asthma Paternal Grandmother    • Depression Paternal Grandmother    • Alzheimer's disease Paternal Grandfather    • Alcohol abuse Paternal Grandfather    • Depression Paternal Grandfather    • Alcohol abuse Brother    • Arthritis Brother    • Depression Brother    • Drug abuse Brother         Marijuana for chronic pain   • Learning disabilities Brother         ADHD   • Arthritis Maternal Grandmother    • Depression Maternal Grandmother    • Diabetes Maternal Grandmother    • Heart disease Maternal Grandmother    • Hyperlipidemia Maternal Grandmother    • Hypertension Maternal Grandmother    • Birth defects Maternal Uncle         CP   • Hyperlipidemia Maternal Uncle    • Cancer Maternal Grandfather         Kidney, bone cancer 1980's   • Cancer Maternal Aunt         Breast cancer 2003   • Depression Maternal Aunt    • Miscarriages / Stillbirths Maternal Aunt    • Cancer Maternal Aunt         Lung cancer 2015   • Depression Maternal Aunt    • Diabetes Maternal Aunt    • Cancer Maternal Uncle         Prostate Cancer   • Cancer Maternal Uncle         Thymus and Pancreatic Cancer   • Depression Father    • Diabetes Father    • Hyperlipidemia Father    • Colon polyps Father    • Depression Daughter    • Learning disabilities Daughter         ADHD   • Diabetes Maternal Aunt         Pancreatic Insulinoma removed   • Learning disabilities Son         ADHD, SPD   •  Malig Hyperthermia Neg Hx          SOCIAL HISTORY  Social History     Socioeconomic History   • Marital status:      Spouse name: Not on file   • Number of children: Not on file   • Years of education: Not on file   • Highest education level: Not on file   Tobacco Use   • Smoking status: Never Smoker   • Smokeless tobacco: Never Used   Substance and Sexual Activity   • Alcohol use: Yes     Comment: Socially on holidays   • Drug use: Yes     Types: Other     Comment: Gababentin.Lamictal   • Sexual activity: Never         ALLERGIES  Budesonide-formoterol fumarate; Oxycodone-acetaminophen; and Pregabalin        REVIEW OF SYSTEMS  Review of Systems   Constitutional: Negative for diaphoresis and fever.   HENT: Positive for congestion.    Eyes: Negative for visual disturbance.   Respiratory: Positive for cough (productive). Negative for shortness of breath.    Cardiovascular: Positive for chest pain (R sided). Negative for palpitations.   Gastrointestinal: Positive for diarrhea and vomiting (resolved at this time). Negative for blood in stool.   Endocrine: Negative for polyuria.   Genitourinary: Negative for flank pain.   Musculoskeletal: Negative for joint swelling.   Skin: Negative for wound.   Neurological: Positive for weakness (generalized). Negative for seizures.   Hematological: Negative for adenopathy.   Psychiatric/Behavioral: Negative for sleep disturbance.          PHYSICAL EXAM  ED Triage Vitals [01/15/20 2032]   Temp Heart Rate Resp BP SpO2   (!) 100.8 °F (38.2 °C) 89 16 -- 93 %      Temp src Heart Rate Source Patient Position BP Location FiO2 (%)   Tympanic Monitor -- -- --         GENERAL: awake and alert, no acute distress, appears uncomfortable  HENT: nares patent  EYES: no scleral icterus  CV: regular rhythm, normal rate, no murmur  RESPIRATORY: normal effort, mild rhonchi throughoutt R lung fields, no wheezing  ABDOMEN: soft, non-tender throughout  MUSCULOSKELETAL: no deformity, no BLE  edema  NEURO: alert, moves all extremities, follows commands  SKIN: warm, dry    Vital signs and nursing notes reviewed.      LAB RESULTS  Recent Results (from the past 24 hour(s))   Comprehensive Metabolic Panel    Collection Time: 01/15/20  9:12 PM   Result Value Ref Range    Glucose 108 (H) 65 - 99 mg/dL    BUN 26 (H) 6 - 20 mg/dL    Creatinine 1.14 (H) 0.57 - 1.00 mg/dL    Sodium 137 136 - 145 mmol/L    Potassium 3.3 (L) 3.5 - 5.2 mmol/L    Chloride 97 (L) 98 - 107 mmol/L    CO2 23.1 22.0 - 29.0 mmol/L    Calcium 9.7 8.6 - 10.5 mg/dL    Total Protein 7.6 6.0 - 8.5 g/dL    Albumin 4.00 3.50 - 5.20 g/dL    ALT (SGPT) 17 1 - 33 U/L    AST (SGOT) 31 1 - 32 U/L    Alkaline Phosphatase 77 39 - 117 U/L    Total Bilirubin 0.3 0.2 - 1.2 mg/dL    eGFR Non African Amer 50 (L) >60 mL/min/1.73    Globulin 3.6 gm/dL    A/G Ratio 1.1 g/dL    BUN/Creatinine Ratio 22.8 7.0 - 25.0    Anion Gap 16.9 (H) 5.0 - 15.0 mmol/L   BNP    Collection Time: 01/15/20  9:12 PM   Result Value Ref Range    proBNP 86.0 5.0 - 900.0 pg/mL   Troponin    Collection Time: 01/15/20  9:12 PM   Result Value Ref Range    Troponin T <0.010 0.000 - 0.030 ng/mL   CBC Auto Differential    Collection Time: 01/15/20  9:12 PM   Result Value Ref Range    WBC 5.04 3.40 - 10.80 10*3/mm3    RBC 4.46 3.77 - 5.28 10*6/mm3    Hemoglobin 12.7 12.0 - 15.9 g/dL    Hematocrit 37.6 34.0 - 46.6 %    MCV 84.3 79.0 - 97.0 fL    MCH 28.5 26.6 - 33.0 pg    MCHC 33.8 31.5 - 35.7 g/dL    RDW 12.6 12.3 - 15.4 %    RDW-SD 38.6 37.0 - 54.0 fl    MPV 9.5 6.0 - 12.0 fL    Platelets 173 140 - 450 10*3/mm3   Manual Differential    Collection Time: 01/15/20  9:12 PM   Result Value Ref Range    Neutrophil % 91.9 (H) 42.7 - 76.0 %    Lymphocyte % 6.1 (L) 19.6 - 45.3 %    Monocyte % 2.0 (L) 5.0 - 12.0 %    Neutrophils Absolute 4.63 1.70 - 7.00 10*3/mm3    Lymphocytes Absolute 0.31 (L) 0.70 - 3.10 10*3/mm3    Monocytes Absolute 0.10 0.10 - 0.90 10*3/mm3    Anisocytosis Slight/1+ None Seen     Microcytes Slight/1+ None Seen    WBC Morphology Normal Normal    Platelet Morphology Normal Normal       Ordered the above labs and reviewed the results.        RADIOLOGY  Ct Chest Without Contrast    Result Date: 1/15/2020  CT OF THE CHEST WITHOUT CONTRAST 01/15/2020  HISTORY: Cough, fever.  TECHNIQUE: Axial images were obtained from the lung apices to the upper abdomen. No intravenous contrast was given.  FINDINGS: In the right upper lobe there is a moderately large area of consolidation containing air bronchograms with some scattered smaller areas of increased density.  In the right lower lobe there is a moderately large area of consolidation with air bronchograms as well.  There is evidence of previous right diaphragmatic repair but there still is a small diaphragmatic hernia with a small focus of liver herniating into the lower chest. This is best seen on the sagittal reconstruction images with this small focus of herniated liver measuring approximately 2.4 cm. In the left lower lobe there is some mild patchy atelectasis or pneumonia.  Shotty mediastinal nodes are seen. Small amount of fluid is seen in pericardial recesses. Small calcified left hilar region lymph node is seen.      1. Moderately severe areas of dense pneumonia involving the right upper lobe and right lower lobe and minimally in the left lower lobe. 2. There is evidence of previous right diaphragm repair with a right diaphragmatic hernia containing a small portion of the liver as discussed above. 3. Short-term follow-up CT of the chest in approximately 2 months recommended. 4. Findings were discussed with Dr. Ceballos.  Radiation dose reduction techniques were utilized, including automated exposure control and exposure modulation based on body size.         Ordered the above noted radiological studies. Reviewed by me in PACS.        PROCEDURES  Procedures        EKG:           EKG time: 2100  Rhythm/Rate: SR 77  P waves and NM: Nml  QRS, axis:  Nml axis   ST and T waves: No acute ischemic changes     Interpreted Contemporaneously by me, independently viewed  Similar compared to prior 11/27/2017            MEDICATIONS GIVEN IN ER  Medications   sodium chloride 0.9 % flush 10 mL (10 mL Intravenous Given 1/15/20 2230)   cefTRIAXone (ROCEPHIN) IVPB 1 g (1 g Intravenous New Bag 1/15/20 2225)   azithromycin (ZITHROMAX) 500 mg 0.9% NaCl (Add-vantage) 250 mL (has no administration in time range)   oseltamivir (TAMIFLU) capsule 75 mg (75 mg Oral Given 1/15/20 2225)   albuterol (PROVENTIL) nebulizer solution 0.083% 2.5 mg/3mL (2.5 mg Nebulization Given 1/15/20 2149)         MEDICAL DECISION MAKING and ED Course          2130: Rechecked pt who is resting comfortably. Informed pt on lab and imaging results. Discussed plan to admit pt for further evaluation and treatment. Pt understands and agrees with the plan, all questions answered.      MDM       54-year-old female who was diagnosed with the flu 3 days prior presents today with complaint of right-sided chest pain and shortness of air.  She has complex history involving her right hemidiaphragm and has had prior pleural effusions, diaphragmatic hernia, ectopic liver within the right hemithorax, therefore I proceeded directly to CT chest without contrast rather than obtaining chest x-ray.  This shows multiple areas of dense pneumonia throughout the right lung and also some in the left lung.  There is a small right diaphragmatic hernia with a small portion of liver herniating into the right hemithorax.  There is no associated pleural effusion.  Given her associated asthma and the progression of her symptoms today, she will be admitted and started on empiric antibiotics as well as oral Tamiflu.  Labs reviewed and are reassuring.  She has no leukocytosis but does have a left shift.  Serum lactate is pending.  Her EKG is without ischemic changes and cardiac troponin is negative.    DIAGNOSIS  Final diagnoses:   Multifocal  pneumonia   Influenza A         DISPOSITION  ADMISSION    Discussed treatment plan and reason for admission with pt/family and admitting physician.  Pt/family voiced understanding of the plan for admission for further testing/treatment as needed.           Latest Documented Vital Signs:  As of 10:37 PM  BP- 115/52 HR- 74 Temp- (!) 100.8 °F (38.2 °C) (Tympanic) O2 sat- 92%        --  Documentation assistance provided by nikita Chavez for Dr. CLEVE Ceballos MD.  Information recorded by the scribe was done at my direction and has been verified and validated by me.      Please note that portions of this were completed with a voice recognition program.            Liliya Chavez  01/15/20 2212       Goerge Ceballos MD  01/15/20 2239       George Ceballos MD  01/15/20 2319      Electronically signed by George Ceballos MD at 01/15/20 2319           Lines, Drains & Airways    Active LDAs     Name:   Placement date:   Placement time:   Site:   Days:    Peripheral IV 01/15/20 2100 Left Antecubital   01/15/20    2100    Antecubital   1    Peripheral IV 01/15/20 2355 Right;Lateral Antecubital   01/15/20    2355    Antecubital   1                  Current Facility-Administered Medications   Medication Dose Route Frequency Provider Last Rate Last Dose   • acetaminophen (TYLENOL) tablet 650 mg  650 mg Oral Q4H PRN Cecil Villalba MD   650 mg at 01/16/20 0828    Or   • acetaminophen (TYLENOL) 160 MG/5ML solution 650 mg  650 mg Oral Q4H PRN Cecil Villalba MD        Or   • acetaminophen (TYLENOL) suppository 650 mg  650 mg Rectal Q4H PRN Cecil Villalba MD       • albuterol (PROVENTIL) nebulizer solution 0.083% 2.5 mg/3mL  2.5 mg Nebulization Q6H PRN Cecil Villalba MD   2.5 mg at 01/17/20 0225   • bisacodyl (DULCOLAX) EC tablet 5 mg  5 mg Oral Daily PRN Cecil Villalba MD       • buPROPion XL (WELLBUTRIN XL) 24 hr tablet 300 mg  300 mg Oral Q PM Cecil Villalba MD    300 mg at 01/16/20 1612   • cefTRIAXone (ROCEPHIN) IVPB 1 g  1 g Intravenous Q24H Cecil Villalba MD   Stopped at 01/16/20 1700   • cyclobenzaprine (FLEXERIL) tablet 10 mg  10 mg Oral BID PRN Cecil Villalba MD       • doxycycline (VIBRAMYCIN) 100 mg/100 mL 0.9% NS MBP  100 mg Intravenous Q12H Cecil Villalba MD 0 mL/hr at 01/16/20 2330 100 mg at 01/17/20 0910   • enoxaparin (LOVENOX) syringe 40 mg  40 mg Subcutaneous Q24H Cecil Villalba MD   40 mg at 01/17/20 0910   • FLUoxetine (PROzac) capsule 60 mg  60 mg Oral Nightly Cecil Villalba MD   60 mg at 01/16/20 2137   • gabapentin (NEURONTIN) capsule 400 mg  400 mg Oral TID Cecil Villalba MD   400 mg at 01/17/20 0909   • influenza vac split quad (FLUZONE,FLUARIX,AFLURIA) injection 0.5 mL  0.5 mL Intramuscular Once Cecil Villalba MD       • ondansetron (ZOFRAN) tablet 4 mg  4 mg Oral Q6H PRN Cecil Villalba MD        Or   • ondansetron (ZOFRAN) injection 4 mg  4 mg Intravenous Q6H PRN Cecil Villalba MD       • oseltamivir (TAMIFLU) capsule 75 mg  75 mg Oral Q12H Cecil Villalba MD   75 mg at 01/17/20 0909   • pantoprazole (PROTONIX) EC tablet 40 mg  40 mg Oral Q PM Cecil Villalba MD   40 mg at 01/16/20 1607   • potassium chloride (MICRO-K) CR capsule 40 mEq  40 mEq Oral PRN Vera Rosenberg MD   40 mEq at 01/16/20 1607    Or   • potassium chloride (KLOR-CON) packet 40 mEq  40 mEq Oral PRN Vera Rosenberg MD        Or   • potassium chloride 10 mEq in 100 mL IVPB  10 mEq Intravenous Q1H PRN Vera Rosenberg MD       • promethazine-dextromethorphan (PROMETHAZINE-DM) 6.25-15 MG/5ML syrup 7.5 mL  7.5 mL Oral Q4H PRN Cecil Villalba MD   7.5 mL at 01/17/20 0202   • sodium chloride 0.9 % flush 10 mL  10 mL Intravenous PRN George Ceballos MD   10 mL at 01/15/20 2230   • sodium chloride 0.9 % flush 10 mL  10 mL Intravenous Q12H Cecil Villalba MD   10 mL at 01/16/20 2137   • sodium chloride 0.9 %  flush 10 mL  10 mL Intravenous PRN Cecil Villalba MD       • sodium chloride 0.9 % infusion  75 mL/hr Intravenous Continuous Vera Rosenberg MD 75 mL/hr at 01/17/20 0507 75 mL/hr at 01/17/20 0507   • traZODone (DESYREL) tablet 200 mg  200 mg Oral Nightly Cecil Villalba MD   200 mg at 01/16/20 2136       Orders (active)      Start     Ordered    01/17/20 1200  influenza vac split quad (FLUZONE,FLUARIX,AFLURIA) injection 0.5 mL  Once      01/16/20 0053    01/17/20 0600  Walking Oximetry  Once      01/16/20 1316    01/16/20 1700  buPROPion XL (WELLBUTRIN XL) 24 hr tablet 300 mg  Every Evening      01/15/20 2344    01/16/20 1500  cefTRIAXone (ROCEPHIN) IVPB 1 g  Every 24 Hours      01/15/20 2342    01/16/20 1321  Patient Currently On Electrolyte Replacement Protocol - Please Refer to MAR for Protocol Details  Misc Nursing Order (Specify)  Daily     Comments:  Patient Currently On Electrolyte Replacement Protocol - Please Refer to MAR for Protocol Details    01/16/20 1320    01/16/20 1050  Patient Currently On Electrolyte Replacement Protocol - Please Refer to MAR for Protocol Details  Misc Nursing Order (Specify)  Daily     Comments:  Patient Currently On Electrolyte Replacement Protocol - Please Refer to MAR for Protocol Details    01/16/20 1049    01/16/20 1049  potassium chloride (MICRO-K) CR capsule 40 mEq  As Needed      01/16/20 1049    01/16/20 1049  potassium chloride (KLOR-CON) packet 40 mEq  As Needed      01/16/20 1049    01/16/20 1049  potassium chloride 10 mEq in 100 mL IVPB  Every 1 Hour PRN      01/16/20 1049    01/16/20 0900  enoxaparin (LOVENOX) syringe 40 mg  Every 24 Hours      01/15/20 2342    01/16/20 0900  oseltamivir (TAMIFLU) capsule 75 mg  Every 12 Hours Scheduled      01/15/20 2342    01/16/20 0800  doxycycline (VIBRAMYCIN) 100 mg/100 mL 0.9% NS MBP  Every 12 Hours      01/15/20 2342    01/16/20 0600  Incentive Spirometry  Every 4 Hours While Awake      01/15/20 2342    01/15/20  2346  FLUoxetine (PROzac) capsule 60 mg  Nightly      01/15/20 2344    01/15/20 2346  gabapentin (NEURONTIN) capsule 400 mg  3 Times Daily      01/15/20 2344    01/15/20 2346  pantoprazole (PROTONIX) EC tablet 40 mg  Every Evening      01/15/20 2344    01/15/20 2346  traZODone (DESYREL) tablet 200 mg  Nightly      01/15/20 2344    01/15/20 2344  sodium chloride 0.9 % flush 10 mL  Every 12 Hours Scheduled      01/15/20 2342    01/15/20 2344  sodium chloride 0.9 % infusion  Continuous      01/15/20 2342    01/15/20 2343  cyclobenzaprine (FLEXERIL) tablet 10 mg  2 Times Daily PRN      01/15/20 2344    01/15/20 2342  albuterol (PROVENTIL) nebulizer solution 0.083% 2.5 mg/3mL  Every 6 Hours PRN      01/15/20 2343    01/15/20 2341  promethazine-dextromethorphan (PROMETHAZINE-DM) 6.25-15 MG/5ML syrup 7.5 mL  Every 4 Hours PRN      01/15/20 2342    01/15/20 2341  Code Status and Medical Interventions:  Continuous      01/15/20 2342    01/15/20 2341  Oxygen Therapy- Nasal Cannula; Titrate for SPO2: 90% - 95%  Continuous      01/15/20 2342    01/15/20 2341  Insert Peripheral IV  Once      01/15/20 2342    01/15/20 2341  Diet Regular  Diet Effective Now      01/15/20 2342    01/15/20 2340  acetaminophen (TYLENOL) tablet 650 mg  Every 4 Hours PRN      01/15/20 2342    01/15/20 2340  acetaminophen (TYLENOL) 160 MG/5ML solution 650 mg  Every 4 Hours PRN      01/15/20 2342    01/15/20 2340  acetaminophen (TYLENOL) suppository 650 mg  Every 4 Hours PRN      01/15/20 2342    01/15/20 2340  sodium chloride 0.9 % flush 10 mL  As Needed      01/15/20 2342    01/15/20 2340  bisacodyl (DULCOLAX) EC tablet 5 mg  Daily PRN      01/15/20 2342    01/15/20 2340  ondansetron (ZOFRAN) tablet 4 mg  Every 6 Hours PRN      01/15/20 2342    01/15/20 2340  ondansetron (ZOFRAN) injection 4 mg  Every 6 Hours PRN      01/15/20 2342    01/15/20 2057  Insert peripheral IV  Once      01/15/20 2057    01/15/20 2056  sodium chloride 0.9 % flush 10 mL  As  Needed      01/15/20 2057    Unscheduled  Magnesium  As Needed      20 1049    Unscheduled  Potassium  As Needed      20 1049    Unscheduled  Magnesium  As Needed      20 1320    Unscheduled  Potassium  As Needed      20 1320                   Physician Progress Notes       Vera Rosenberg MD at 20 1317            Name: Machelle Ortega ADMIT: 1/15/2020   : 1965  PCP: Iqra Powell MD    MRN: [id number] LOS: 0 days   AGE/SEX: 54 y.o. female  ROOM: [facility]     Subjective   Subjective   Patient was seen and examined at bedside no acute distress she is resting comfortably she is coughing up sputum yellowish.  Denies any chest pain palpitations    Review of Systems    Objective   Objective   Temp:  [97.7 °F (36.5 °C)-101.4 °F (38.6 °C)] 99.1 °F (37.3 °C)  Heart Rate:  [68-89] 68  Resp:  [16-18] 16  BP: ()/(43-80) 91/47  SpO2:  [83 %-96 %] 91 %  on  Flow (L/min):  [2-4] 4;   Device (Oxygen Therapy): nasal cannula  Body mass index is 32.09 kg/m².    Physical Exam  Alert awake oriented no acute distress  HEENT normocephalic atraumatic neck PERRLA  Cardio S1-S2 appreciated regular rate and rhythm  Chest mild rales  Willie soft nontender nondistended positive bowel sounds  Extremities no edema positive peripheral pulses  Results Review:       I reviewed the patient's new clinical results.  Results from last 7 days   Lab Units 20  0502 01/15/20  2112   WBC 10*3/mm3 3.91 5.04   HEMOGLOBIN g/dL 11.1* 12.7   PLATELETS 10*3/mm3 143 173     Results from last 7 days   Lab Units 20  0502 01/15/20  2112   SODIUM mmol/L 132* 137   POTASSIUM mmol/L 3.0* 3.3*   CHLORIDE mmol/L 97* 97*   CO2 mmol/L 19.7* 23.1   BUN mg/dL 28* 26*   CREATININE mg/dL 1.04* 1.14*   GLUCOSE mg/dL 103* 108*   Estimated Creatinine Clearance: 66.3 mL/min (A) (by C-G formula based on SCr of 1.04 mg/dL (H)).  Results from last 7 days   Lab Units 20  0502 01/15/20  2112   CALCIUM mg/dL 8.8 9.7      ALBUMIN g/dL  --  4.00     Results from last 7 days   Lab Units 01/15/20  2216   LACTATE mmol/L 1.5   No results found for: STREPPNEUAG, LEGANTIGENUR  Results from last 7 days   Lab Units 01/15/20  2223   BLOODCX  No growth at less than 24 hours           buPROPion  mg Oral Q PM   cefTRIAXone 1 g Intravenous Q24H   doxycycline 100 mg Intravenous Q12H   enoxaparin 40 mg Subcutaneous Q24H   FLUoxetine 60 mg Oral Nightly   gabapentin 400 mg Oral TID   [START ON 1/17/2020] influenza vaccine 0.5 mL Intramuscular Once   oseltamivir 75 mg Oral Q12H   pantoprazole 40 mg Oral Q PM   sodium chloride 10 mL Intravenous Q12H   traZODone 200 mg Oral Nightly       sodium chloride 75 mL/hr Last Rate: 75 mL/hr (01/16/20 1123)   Diet Regular        Assessment/Plan     Active Hospital Problems    Diagnosis  POA   • **Multifocal pneumonia [J18.9]  Yes   • Influenza [J11.1]  Yes   • Hypokalemia [E87.6]  Yes   • Sleep apnea [G47.30]  Yes   • GERD (gastroesophageal reflux disease) [K21.9]  Yes      Resolved Hospital Problems   No resolved problems to display.       54 y.o. female with Multifocal pneumonia.  1.  Multifocal pneumonia is evaluated on the CT of the chest with continue azithromycin and Rocephin.  We will get a repeat chest x-ray tomorrow morning in the meantime we will continue Tamiflu as well recently diagnosed with the flu.  Nebulizer treatments and oxygen therapy.  Low-dose IV fluids.  Tylenol as needed any fevers also get a sputum sample for culture  2.  DVT prophylaxis is Lovenox  3 hypokalemia we will replace her potassium and get a repeat level tomorrow a.m.      Vera Rosenberg MD  [facility]  01/16/20  1:17 PM    Electronically signed by Vera Rosenberg MD at 01/16/20 1320             Meeta Chatman RN   Registered Nurse   Vascular Surgery   Plan of Care   Signed   Date of Service:  01/17/20 0318   Creation Time:  01/17/20 0318            Signed                Problem: Patient Care  Overview  Goal: Plan of Care Review  Flowsheets  Taken 1/16/2020 1758 by Adelita Gonzalez, RN  Progress: improving  Taken 1/16/2020 2000 by Meeta Chatman, RN  Plan of Care Reviewed With: patient  Taken 1/17/2020 0315 by Meeta Chatman, RN  Outcome Summary: using cpap and then oxygen at 4 literswhen off cpap coughing quite a bit and unable to sleep cough med give also complains of soa breathing treatment requested and given with good results able to rest and is breathing easier sats 91-92 on cpap no oxygen bled at tis time c/o nausea x1 passed quickly and refused nausea med                   Adelita Gonzalez, RN   Registered Nurse   Vascular Surgery   Plan of Care   Signed   Date of Service:  01/16/20 1800   Creation Time:  01/16/20 1800            Signed             Pt VSS. Afebrile since this AM. Pt states she is feeling better. Sputum cx obtained. Abx continued. PT using IS frequently. Continue to monitor                    Ebony Perez RN      Case Management   Progress Notes   Signed   Date of Service:  01/16/20 1614   Creation Time:  01/16/20 1614            Signed             Discharge Planning Assessment  [facility]     Patient Name: Machelle Ortega                        MRN: [id number]  Today's Date: 1/16/2020                     Admit Date: 1/15/2020              Discharge Plan      Row Name 01/16/20 1613           Plan     Plan  Home     Patient/Family in Agreement with Plan  yes     Plan Comments  Met with pt at bedside.  Introduced self, explained CCP role, facesheet verified.  Pt states she lives with spouse and is independent with ADLs.  Uses cane occasionally.  Has used [facility] in past.  No history of SNF.  Plans to return home at discharge, no identified needs.  CCP will follow.  GALINDO Perez RN

## 2020-01-17 NOTE — PROGRESS NOTES
Name: Machelle Ortega ADMIT: 1/15/2020   : 1965  PCP: Iqra Powell MD    MRN: [id number] LOS: 0 days   AGE/SEX: 54 y.o. female  ROOM: [facility], [place]     Subjective   Subjective   Patient was seen and examined at bedside no acute distress she is resting comfortably.  Still coughing significantly  Review of Systems     Objective   Objective   Temp:  [98.1 °F (36.7 °C)-98.9 °F (37.2 °C)] 98.4 °F (36.9 °C)  Heart Rate:  [62-72] 72  Resp:  [16-17] 16  BP: ()/(54-61) 101/61  SpO2:  [90 %-93 %] 91 %  on  Flow (L/min):  [4] 4;   Device (Oxygen Therapy): nasal cannula  Body mass index is 32.09 kg/m².    Physical Exam  Alert awake oriented no acute distress  HEENT normocephalic atraumatic neck PERRLA  Cardio S1-S2 appreciated regular rate and rhythm  Chest mild rales  Willie soft nontender nondistended positive bowel sounds  Extremities no edema positive peripheral pulses  Results Review:       I reviewed the patient's new clinical results.  Results from last 7 days   Lab Units 20  0502 01/15/20  2112   WBC 10*3/mm3 3.54 3.91 5.04   HEMOGLOBIN g/dL 9.7* 11.1* 12.7   PLATELETS 10*3/mm3 147 143 173     Results from last 7 days   Lab Units 20  04220  0502 01/15/20  2112   SODIUM mmol/L 139 132* 137   POTASSIUM mmol/L 4.0 3.0* 3.3*   CHLORIDE mmol/L 107 97* 97*   CO2 mmol/L 20.1* 19.7* 23.1   BUN mg/dL 21* 28* 26*   CREATININE mg/dL 0.75 1.04* 1.14*   GLUCOSE mg/dL 101* 103* 108*   Estimated Creatinine Clearance: 91.9 mL/min (by C-G formula based on SCr of 0.75 mg/dL).  Results from last 7 days   Lab Units 20  0502 01/15/20  2112   CALCIUM mg/dL 8.6 8.8 9.7   ALBUMIN g/dL  --   --  4.00     Results from last 7 days   Lab Units 01/15/20  2216   LACTATE mmol/L 1.5   No results found for: STREPPNEUAG, LEGANTIGENUR  Results from last 7 days   Lab Units 20  1608 01/15/20  2223   BLOODCX   --  No growth at 24 hours   RESPCX  Light growth (2+) Normal Respiratory  Lucero  --            buPROPion  mg Oral Q PM   cefTRIAXone 1 g Intravenous Q24H   doxycycline 100 mg Oral Q12H   enoxaparin 40 mg Subcutaneous Q24H   FLUoxetine 60 mg Oral Nightly   gabapentin 400 mg Oral TID   influenza vaccine 0.5 mL Intramuscular Once   oseltamivir 75 mg Oral Q12H   pantoprazole 40 mg Oral Q PM   sodium chloride 10 mL Intravenous Q12H   traZODone 200 mg Oral Nightly      Diet Regular         Assessment/Plan     Active Hospital Problems    Diagnosis  POA   • **Multifocal pneumonia [J18.9]  Yes   • Influenza [J11.1]  Yes   • Hypokalemia [E87.6]  Yes   • Sleep apnea [G47.30]  Yes   • GERD (gastroesophageal reflux disease) [K21.9]  Yes      Resolved Hospital Problems   No resolved problems to display.       54 y.o. female with Multifocal pneumonia.  1.  Multifocal pneumonia is evaluated on the CT of the chest with continue azithromycin and Rocephin.  Chest x-ray from this morning shows worsening.   we will continue Tamiflu as well recently diagnosed with the flu.  Nebulizer treatments and oxygen therapy.  DC fluids. Tylenol as needed any fevers also get a sputum sample for culture  2.  DVT prophylaxis is Lovenox  3 hypokalemia resolved  4.  Frequent PVCs we will get an echocardiogram to further evaluate      Vera Rosenberg MD  [facility]  01/17/20  1:10 PM

## 2020-01-17 NOTE — PLAN OF CARE
Problem: Patient Care Overview  Goal: Plan of Care Review  Outcome: Ongoing (interventions implemented as appropriate)  Flowsheets (Taken 1/17/2020 1614)  Progress: improving  Plan of Care Reviewed With: patient  Outcome Summary: VSS. denies pain, cought controlled with PRN medication, resident tolerating room air, echo complete today. no S/S of discomfort or distress. will continue to monitor

## 2020-01-18 ENCOUNTER — TELEPHONE (OUTPATIENT)
Dept: SLEEP MEDICINE | Facility: HOSPITAL | Age: 55
End: 2020-01-18

## 2020-01-18 LAB
ANION GAP SERPL CALCULATED.3IONS-SCNC: 11.5 MMOL/L (ref 5–15)
BACTERIA SPEC RESP CULT: NORMAL
BASOPHILS # BLD AUTO: 0.01 10*3/MM3 (ref 0–0.2)
BASOPHILS NFR BLD AUTO: 0.4 % (ref 0–1.5)
BUN BLD-MCNC: 14 MG/DL (ref 6–20)
BUN/CREAT SERPL: 23.3 (ref 7–25)
CALCIUM SPEC-SCNC: 8.6 MG/DL (ref 8.6–10.5)
CHLORIDE SERPL-SCNC: 109 MMOL/L (ref 98–107)
CO2 SERPL-SCNC: 21.5 MMOL/L (ref 22–29)
CREAT BLD-MCNC: 0.6 MG/DL (ref 0.57–1)
DEPRECATED RDW RBC AUTO: 39.9 FL (ref 37–54)
EOSINOPHIL # BLD AUTO: 0.02 10*3/MM3 (ref 0–0.4)
EOSINOPHIL NFR BLD AUTO: 0.7 % (ref 0.3–6.2)
ERYTHROCYTE [DISTWIDTH] IN BLOOD BY AUTOMATED COUNT: 12.6 % (ref 12.3–15.4)
GFR SERPL CREATININE-BSD FRML MDRD: 104 ML/MIN/1.73
GLUCOSE BLD-MCNC: 89 MG/DL (ref 65–99)
GRAM STN SPEC: NORMAL
HCT VFR BLD AUTO: 26.4 % (ref 34–46.6)
HGB BLD-MCNC: 8.9 G/DL (ref 12–15.9)
IMM GRANULOCYTES # BLD AUTO: 0.03 10*3/MM3 (ref 0–0.05)
IMM GRANULOCYTES NFR BLD AUTO: 1.1 % (ref 0–0.5)
LYMPHOCYTES # BLD AUTO: 0.85 10*3/MM3 (ref 0.7–3.1)
LYMPHOCYTES NFR BLD AUTO: 31.3 % (ref 19.6–45.3)
MCH RBC QN AUTO: 29.5 PG (ref 26.6–33)
MCHC RBC AUTO-ENTMCNC: 33.7 G/DL (ref 31.5–35.7)
MCV RBC AUTO: 87.4 FL (ref 79–97)
MONOCYTES # BLD AUTO: 0.29 10*3/MM3 (ref 0.1–0.9)
MONOCYTES NFR BLD AUTO: 10.7 % (ref 5–12)
NEUTROPHILS # BLD AUTO: 1.52 10*3/MM3 (ref 1.7–7)
NEUTROPHILS NFR BLD AUTO: 55.8 % (ref 42.7–76)
NRBC BLD AUTO-RTO: 0 /100 WBC (ref 0–0.2)
PLATELET # BLD AUTO: 167 10*3/MM3 (ref 140–450)
PMV BLD AUTO: 9.9 FL (ref 6–12)
POTASSIUM BLD-SCNC: 4 MMOL/L (ref 3.5–5.2)
RBC # BLD AUTO: 3.02 10*6/MM3 (ref 3.77–5.28)
SODIUM BLD-SCNC: 142 MMOL/L (ref 136–145)
WBC NRBC COR # BLD: 2.72 10*3/MM3 (ref 3.4–10.8)

## 2020-01-18 PROCEDURE — 25010000002 INFLUENZA VAC SPLIT QUAD 0.5 ML SUSPENSION PREFILLED SYRINGE: Performed by: INTERNAL MEDICINE

## 2020-01-18 PROCEDURE — 94799 UNLISTED PULMONARY SVC/PX: CPT

## 2020-01-18 PROCEDURE — 25010000002 ENOXAPARIN PER 10 MG: Performed by: INTERNAL MEDICINE

## 2020-01-18 PROCEDURE — 90686 IIV4 VACC NO PRSV 0.5 ML IM: CPT | Performed by: INTERNAL MEDICINE

## 2020-01-18 PROCEDURE — 80048 BASIC METABOLIC PNL TOTAL CA: CPT | Performed by: INTERNAL MEDICINE

## 2020-01-18 PROCEDURE — 25010000002 CEFTRIAXONE PER 250 MG: Performed by: INTERNAL MEDICINE

## 2020-01-18 PROCEDURE — G0008 ADMIN INFLUENZA VIRUS VAC: HCPCS | Performed by: INTERNAL MEDICINE

## 2020-01-18 PROCEDURE — 85025 COMPLETE CBC W/AUTO DIFF WBC: CPT | Performed by: INTERNAL MEDICINE

## 2020-01-18 RX ADMIN — GABAPENTIN 400 MG: 400 CAPSULE ORAL at 21:33

## 2020-01-18 RX ADMIN — TRAZODONE HYDROCHLORIDE 200 MG: 100 TABLET ORAL at 21:33

## 2020-01-18 RX ADMIN — FLUOXETINE HYDROCHLORIDE 60 MG: 20 CAPSULE ORAL at 21:33

## 2020-01-18 RX ADMIN — BUPROPION HYDROCHLORIDE 300 MG: 300 TABLET, EXTENDED RELEASE ORAL at 17:16

## 2020-01-18 RX ADMIN — ALBUTEROL SULFATE 2.5 MG: 2.5 SOLUTION RESPIRATORY (INHALATION) at 17:17

## 2020-01-18 RX ADMIN — GABAPENTIN 400 MG: 400 CAPSULE ORAL at 09:02

## 2020-01-18 RX ADMIN — PANTOPRAZOLE SODIUM 40 MG: 40 TABLET, DELAYED RELEASE ORAL at 17:16

## 2020-01-18 RX ADMIN — DEXTROMETHORPHAN HYDROBROMIDE AND PROMETHAZINE HYDROCHLORIDE 7.5 ML: 15; 6.25 SOLUTION ORAL at 21:41

## 2020-01-18 RX ADMIN — DOXYCYCLINE 100 MG: 100 CAPSULE ORAL at 09:02

## 2020-01-18 RX ADMIN — GABAPENTIN 400 MG: 400 CAPSULE ORAL at 17:16

## 2020-01-18 RX ADMIN — SODIUM CHLORIDE, PRESERVATIVE FREE 10 ML: 5 INJECTION INTRAVENOUS at 21:33

## 2020-01-18 RX ADMIN — CEFTRIAXONE SODIUM 1 G: 1 INJECTION, SOLUTION INTRAVENOUS at 14:52

## 2020-01-18 RX ADMIN — OSELTAMIVIR PHOSPHATE 75 MG: 75 CAPSULE ORAL at 21:32

## 2020-01-18 RX ADMIN — ENOXAPARIN SODIUM 40 MG: 40 INJECTION SUBCUTANEOUS at 09:02

## 2020-01-18 RX ADMIN — DOXYCYCLINE 100 MG: 100 CAPSULE ORAL at 21:33

## 2020-01-18 RX ADMIN — OSELTAMIVIR PHOSPHATE 75 MG: 75 CAPSULE ORAL at 09:02

## 2020-01-18 RX ADMIN — INFLUENZA A VIRUS A/BRISBANE/02/2018 IVR-190 (H1N1) ANTIGEN (PROPIOLACTONE INACTIVATED), INFLUENZA A VIRUS A/KANSAS/14/2017 X-327 (H3N2) ANTIGEN (PROPIOLACTONE INACTIVATED), INFLUENZA B VIRUS B/MARYLAND/15/2016 ANTIGEN (PROPIOLACTONE INACTIVATED), INFLUENZA B VIRUS B/PHUKET/3073/2013 BVR-1B ANTIGEN (PROPIOLACTONE INACTIVATED) 0.5 ML: 15; 15; 15; 15 INJECTION, SUSPENSION INTRAMUSCULAR at 14:53

## 2020-01-18 RX ADMIN — SODIUM CHLORIDE, PRESERVATIVE FREE 10 ML: 5 INJECTION INTRAVENOUS at 09:02

## 2020-01-18 NOTE — TELEPHONE ENCOUNTER
I have not seen the patient since April 2014.  The patient is presently in the hospital with pneumonia.  Downloads were obtained between 10/19 and 1/16/2020.  Planes only 36%.  Average usage 5 hours and 41 minutes.  AHI is mildly abnormal at 7 with average auto CPAP pressure of 15.5 and her auto CPAP is 8-20.  Auto CPAP was changed to 10-28 and the patient was told to follow-up in 6 or 8 weeks after hospital discharge.

## 2020-01-18 NOTE — PLAN OF CARE
Problem: Patient Care Overview  Goal: Plan of Care Review  Flowsheets  Taken 1/17/2020 1614 by Lidia López, RN  Progress: improving  Taken 1/17/2020 2000 by Pau Shelby, RN  Plan of Care Reviewed With: patient  Taken 1/18/2020 0506 by Pau Shelby, RN  Outcome Summary: Pt was febrile at 2000 vitals, educated on use of IS; cough controlled with PRN medication. no complaints or distress noted; will continue to monitor

## 2020-01-18 NOTE — PROGRESS NOTES
Name: Machelle Ortega ADMIT: 1/15/2020   : 1965  PCP: Iqra Powell MD    MRN: [id number] LOS: 1 days   AGE/SEX: 54 y.o. female  ROOM: [facility]     Subjective   Subjective   Patient was seen and examined at bedside no acute distress  Exam improvement today  Objective   Objective   Temp:  [97.5 °F (36.4 °C)-101.1 °F (38.4 °C)] 97.5 °F (36.4 °C)  Heart Rate:  [57-79] 62  Resp:  [16] 16  BP: (100-106)/(55-67) 105/55  SpO2:  [93 %-97 %] 93 %  on   ;   Device (Oxygen Therapy): room air  Body mass index is 32.37 kg/m².    Physical Exam  Alert awake oriented no acute distress  HEENT normocephalic atraumatic neck PERRLA  Cardio S1-S2 appreciated regular rate and rhythm  Chest clear breath sounds  Abdomen soft nontender nondistended positive bowel sounds  Extremities no edema positive peripheral pulses  Results Review:       I reviewed the patient's new clinical results.  Results from last 7 days   Lab Units 20  0520  0428 20  0502 01/15/20  2112   WBC 10*3/mm3 2.72* 3.54 3.91 5.04   HEMOGLOBIN g/dL 8.9* 9.7* 11.1* 12.7   PLATELETS 10*3/mm3 167 147 143 173     Results from last 7 days   Lab Units 20  0520  0428 20  0502 01/15/20  2112   SODIUM mmol/L 142 139 132* 137   POTASSIUM mmol/L 4.0 4.0 3.0* 3.3*   CHLORIDE mmol/L 109* 107 97* 97*   CO2 mmol/L 21.5* 20.1* 19.7* 23.1   BUN mg/dL 14 21* 28* 26*   CREATININE mg/dL 0.60 0.75 1.04* 1.14*   GLUCOSE mg/dL 89 101* 103* 108*   Estimated Creatinine Clearance: 114.2 mL/min (by C-G formula based on SCr of 0.6 mg/dL).  Results from last 7 days   Lab Units 20  0520  0428 20  0502 01/15/20  2112   CALCIUM mg/dL 8.6 8.6 8.8 9.7   ALBUMIN g/dL  --   --   --  4.00     Results from last 7 days   Lab Units 01/15/20  2216   LACTATE mmol/L 1.5   No results found for: STREPPNEUAG, LEGANTIGENUR  Results from last 7 days   Lab Units 20  1608 01/15/20  2223   BLOODCX   --  No growth at 2 days   RESPCX  Heavy  growth (4+) Normal Respiratory Princess  --            buPROPion  mg Oral Q PM   cefTRIAXone 1 g Intravenous Q24H   doxycycline 100 mg Oral Q12H   enoxaparin 40 mg Subcutaneous Q24H   FLUoxetine 60 mg Oral Nightly   gabapentin 400 mg Oral TID   influenza vaccine 0.5 mL Intramuscular Once   oseltamivir 75 mg Oral Q12H   pantoprazole 40 mg Oral Q PM   sodium chloride 10 mL Intravenous Q12H   traZODone 200 mg Oral Nightly      Diet Regular         Assessment/Plan     Active Hospital Problems    Diagnosis  POA   • **Multifocal pneumonia [J18.9]  Yes   • Influenza [J11.1]  Yes   • Hypokalemia [E87.6]  Yes   • Sleep apnea [G47.30]  Yes   • GERD (gastroesophageal reflux disease) [K21.9]  Yes      Resolved Hospital Problems   No resolved problems to display.       54 y.o. female with Multifocal pneumonia.  1.  Multifocal pneumonia is evaluated on the CT of the chest with continue azithromycin and Rocephin.  Chest x-ray from yesterday shows worsening.   we will continue Tamiflu as well recently diagnosed with the flu.  Nebulizer treatments and oxygen therapy.  DC fluids. Tylenol as needed any fevers also get a sputum sample for culture.  Urine sample showing mixed princess.  Breathing has improved today we will get a repeat chest x-ray for tomorrow  2.  DVT prophylaxis is Lovenox  3 hypokalemia resolved  4.  Frequent PVCs echocardiogram shows normal ejection fraction      Vera Rosenberg MD  [facility]  01/18/20  1:52 PM

## 2020-01-18 NOTE — PLAN OF CARE
"Pt VSS. Working on IS frequently. Encouraged to increase ambulation today. Pt states \"she's just tired\". Pt sat up in chair before lunch. Pt denies pain. Chest xray tomorrow. Continue to monitor  "

## 2020-01-19 ENCOUNTER — APPOINTMENT (OUTPATIENT)
Dept: GENERAL RADIOLOGY | Facility: HOSPITAL | Age: 55
End: 2020-01-19

## 2020-01-19 LAB
ANION GAP SERPL CALCULATED.3IONS-SCNC: 12.4 MMOL/L (ref 5–15)
ANISOCYTOSIS BLD QL: ABNORMAL
BUN BLD-MCNC: 13 MG/DL (ref 6–20)
BUN/CREAT SERPL: 19.4 (ref 7–25)
CALCIUM SPEC-SCNC: 8.7 MG/DL (ref 8.6–10.5)
CHLORIDE SERPL-SCNC: 109 MMOL/L (ref 98–107)
CO2 SERPL-SCNC: 22.6 MMOL/L (ref 22–29)
CREAT BLD-MCNC: 0.67 MG/DL (ref 0.57–1)
DEPRECATED RDW RBC AUTO: 39.6 FL (ref 37–54)
ERYTHROCYTE [DISTWIDTH] IN BLOOD BY AUTOMATED COUNT: 12.5 % (ref 12.3–15.4)
GFR SERPL CREATININE-BSD FRML MDRD: 92 ML/MIN/1.73
GLUCOSE BLD-MCNC: 97 MG/DL (ref 65–99)
HCT VFR BLD AUTO: 26.3 % (ref 34–46.6)
HGB BLD-MCNC: 8.6 G/DL (ref 12–15.9)
LYMPHOCYTES # BLD MANUAL: 0.5 10*3/MM3 (ref 0.7–3.1)
LYMPHOCYTES NFR BLD MANUAL: 19 % (ref 19.6–45.3)
LYMPHOCYTES NFR BLD MANUAL: 8 % (ref 5–12)
MCH RBC QN AUTO: 28.5 PG (ref 26.6–33)
MCHC RBC AUTO-ENTMCNC: 32.7 G/DL (ref 31.5–35.7)
MCV RBC AUTO: 87.1 FL (ref 79–97)
MICROCYTES BLD QL: ABNORMAL
MONOCYTES # BLD AUTO: 0.21 10*3/MM3 (ref 0.1–0.9)
NEUTROPHILS # BLD AUTO: 1.91 10*3/MM3 (ref 1.7–7)
NEUTROPHILS NFR BLD MANUAL: 73 % (ref 42.7–76)
PLAT MORPH BLD: NORMAL
PLATELET # BLD AUTO: 179 10*3/MM3 (ref 140–450)
PMV BLD AUTO: 9.4 FL (ref 6–12)
POTASSIUM BLD-SCNC: 3.6 MMOL/L (ref 3.5–5.2)
RBC # BLD AUTO: 3.02 10*6/MM3 (ref 3.77–5.28)
SODIUM BLD-SCNC: 144 MMOL/L (ref 136–145)
WBC MORPH BLD: NORMAL
WBC NRBC COR # BLD: 2.61 10*3/MM3 (ref 3.4–10.8)

## 2020-01-19 PROCEDURE — 94799 UNLISTED PULMONARY SVC/PX: CPT

## 2020-01-19 PROCEDURE — 25010000002 ENOXAPARIN PER 10 MG: Performed by: INTERNAL MEDICINE

## 2020-01-19 PROCEDURE — 80048 BASIC METABOLIC PNL TOTAL CA: CPT | Performed by: INTERNAL MEDICINE

## 2020-01-19 PROCEDURE — 85025 COMPLETE CBC W/AUTO DIFF WBC: CPT | Performed by: INTERNAL MEDICINE

## 2020-01-19 PROCEDURE — 94660 CPAP INITIATION&MGMT: CPT

## 2020-01-19 PROCEDURE — 85007 BL SMEAR W/DIFF WBC COUNT: CPT | Performed by: INTERNAL MEDICINE

## 2020-01-19 PROCEDURE — 71045 X-RAY EXAM CHEST 1 VIEW: CPT

## 2020-01-19 PROCEDURE — 25010000002 CEFTRIAXONE PER 250 MG: Performed by: INTERNAL MEDICINE

## 2020-01-19 RX ORDER — POLYETHYLENE GLYCOL 3350 17 G/17G
17 POWDER, FOR SOLUTION ORAL DAILY
Status: DISCONTINUED | OUTPATIENT
Start: 2020-01-19 | End: 2020-01-20 | Stop reason: HOSPADM

## 2020-01-19 RX ADMIN — OSELTAMIVIR PHOSPHATE 75 MG: 75 CAPSULE ORAL at 08:45

## 2020-01-19 RX ADMIN — DOXYCYCLINE 100 MG: 100 CAPSULE ORAL at 08:45

## 2020-01-19 RX ADMIN — POLYETHYLENE GLYCOL 3350 17 G: 17 POWDER, FOR SOLUTION ORAL at 10:44

## 2020-01-19 RX ADMIN — GABAPENTIN 400 MG: 400 CAPSULE ORAL at 16:30

## 2020-01-19 RX ADMIN — FLUOXETINE HYDROCHLORIDE 60 MG: 20 CAPSULE ORAL at 20:10

## 2020-01-19 RX ADMIN — DOXYCYCLINE 100 MG: 100 CAPSULE ORAL at 20:10

## 2020-01-19 RX ADMIN — SODIUM CHLORIDE, PRESERVATIVE FREE 10 ML: 5 INJECTION INTRAVENOUS at 20:10

## 2020-01-19 RX ADMIN — TRAZODONE HYDROCHLORIDE 200 MG: 100 TABLET ORAL at 20:10

## 2020-01-19 RX ADMIN — GABAPENTIN 400 MG: 400 CAPSULE ORAL at 20:10

## 2020-01-19 RX ADMIN — OSELTAMIVIR PHOSPHATE 75 MG: 75 CAPSULE ORAL at 20:10

## 2020-01-19 RX ADMIN — SODIUM CHLORIDE, PRESERVATIVE FREE 10 ML: 5 INJECTION INTRAVENOUS at 08:45

## 2020-01-19 RX ADMIN — BUPROPION HYDROCHLORIDE 300 MG: 300 TABLET, EXTENDED RELEASE ORAL at 16:30

## 2020-01-19 RX ADMIN — ALBUTEROL SULFATE 2.5 MG: 2.5 SOLUTION RESPIRATORY (INHALATION) at 09:47

## 2020-01-19 RX ADMIN — GABAPENTIN 400 MG: 400 CAPSULE ORAL at 08:45

## 2020-01-19 RX ADMIN — ENOXAPARIN SODIUM 40 MG: 40 INJECTION SUBCUTANEOUS at 08:45

## 2020-01-19 RX ADMIN — MAGNESIUM HYDROXIDE 10 ML: 2400 SUSPENSION ORAL at 20:10

## 2020-01-19 RX ADMIN — CEFTRIAXONE SODIUM 1 G: 1 INJECTION, SOLUTION INTRAVENOUS at 16:30

## 2020-01-19 RX ADMIN — DEXTROMETHORPHAN HYDROBROMIDE AND PROMETHAZINE HYDROCHLORIDE 7.5 ML: 15; 6.25 SOLUTION ORAL at 23:01

## 2020-01-19 RX ADMIN — PANTOPRAZOLE SODIUM 40 MG: 40 TABLET, DELAYED RELEASE ORAL at 16:30

## 2020-01-19 NOTE — PLAN OF CARE
Problem: Patient Care Overview  Goal: Plan of Care Review  Flowsheets (Taken 1/19/2020 0537)  Outcome Summary: No complaints of pain no temp resting well ambulating in scruggs tolerating well hgb down to 8.6 from, 12.7 on admit will have attending address today

## 2020-01-19 NOTE — PROGRESS NOTES
Name: Machelle Ortega ADMIT: 1/15/2020   : 1965  PCP: Iqra Powell MD    MRN: [id number] LOS: 2 days   AGE/SEX: 54 y.o. female  ROOM: [facility]     Subjective   Subjective   Patient was seen and examined at bedside no acute distress  Exam improvement today  Objective   Objective   Temp:  [97.9 °F (36.6 °C)-99.4 °F (37.4 °C)] 99.3 °F (37.4 °C)  Heart Rate:  [51-66] 66  Resp:  [16-20] 18  BP: (103-110)/(51-67) 110/67  SpO2:  [93 %-97 %] 94 %  on   ;   Device (Oxygen Therapy): room air  Body mass index is 32.37 kg/m².    Physical Exam  Alert awake oriented no acute distress  HEENT normocephalic atraumatic neck PERRLA  Cardio S1-S2 appreciated regular rate and rhythm  Chest clear breath sounds  Abdomen soft nontender nondistended positive bowel sounds  Extremities no edema positive peripheral pulses  Results Review:       I reviewed the patient's new clinical results.  Results from last 7 days   Lab Units 20  04320  0520  0428 20  0502   WBC 10*3/mm3 2.61* 2.72* 3.54 3.91   HEMOGLOBIN g/dL 8.6* 8.9* 9.7* 11.1*   PLATELETS 10*3/mm3 179 167 147 143     Results from last 7 days   Lab Units 20  04320  0428 20  0502   SODIUM mmol/L 144 142 139 132*   POTASSIUM mmol/L 3.6 4.0 4.0 3.0*   CHLORIDE mmol/L 109* 109* 107 97*   CO2 mmol/L 22.6 21.5* 20.1* 19.7*   BUN mg/dL 13 14 21* 28*   CREATININE mg/dL 0.67 0.60 0.75 1.04*   GLUCOSE mg/dL 97 89 101* 103*   Estimated Creatinine Clearance: 102.3 mL/min (by C-G formula based on SCr of 0.67 mg/dL).  Results from last 7 days   Lab Units 20  0430 20  0512 20  0428 20  0502 01/15/20  2112   CALCIUM mg/dL 8.7 8.6 8.6 8.8 9.7   ALBUMIN g/dL  --   --   --   --  4.00     Results from last 7 days   Lab Units 01/15/20  2216   LACTATE mmol/L 1.5   No results found for: STREPPNEUAG, LEGANTIGENUR  Results from last 7 days   Lab Units 20  1608 01/15/20  2223   BLOODCX   --  No growth at 3  days   RESPCX  Heavy growth (4+) Normal Respiratory Princess  --            buPROPion  mg Oral Q PM   cefTRIAXone 1 g Intravenous Q24H   doxycycline 100 mg Oral Q12H   enoxaparin 40 mg Subcutaneous Q24H   FLUoxetine 60 mg Oral Nightly   gabapentin 400 mg Oral TID   oseltamivir 75 mg Oral Q12H   pantoprazole 40 mg Oral Q PM   polyethylene glycol 17 g Oral Daily   sodium chloride 10 mL Intravenous Q12H   traZODone 200 mg Oral Nightly      Diet Regular         Assessment/Plan     Active Hospital Problems    Diagnosis  POA   • **Multifocal pneumonia [J18.9]  Yes   • Influenza [J11.1]  Yes   • Hypokalemia [E87.6]  Yes   • Sleep apnea [G47.30]  Yes   • GERD (gastroesophageal reflux disease) [K21.9]  Yes      Resolved Hospital Problems   No resolved problems to display.       54 y.o. female with Multifocal pneumonia.  1.  Multifocal pneumonia is evaluated on the CT of the chest with continue azithromycin and Rocephin.  Chest x-ray reviewed.   we will continue Tamiflu as well as nebulizer treatments and oxygen therapy.  DC fluids. Tylenol as needed any fevers also get a sputum sample for culture.  Sputum sample showing mixed princess.  Breathing has improved today he is off oxygen therapy.    2.  DVT prophylaxis is Lovenox  3 hypokalemia resolved  4.  Frequent PVCs echocardiogram shows normal ejection fraction  #5 anemia patient's hemoglobin is low she states she does have chronic anemia and never needed a blood transfusion in the past.  We will go ahead and get an occult blood and iron studies      Vera Rosenberg MD  [facility]  01/19/20  1:43 PM

## 2020-01-19 NOTE — PLAN OF CARE
Pt VSS. Expiratory wheezes this am and pt c/o soa despite O2 sats being 99%. Pt gets relief with prn Breathing tx. Encouraging ambulation and incentive spirometry. Abx continue. Pt c/o being tired. No bm yet today. Miralax started. Fluids encouraged. Stool specimen needed. Pt aware. Continue to monitor.

## 2020-01-20 VITALS
DIASTOLIC BLOOD PRESSURE: 65 MMHG | SYSTOLIC BLOOD PRESSURE: 100 MMHG | WEIGHT: 189.6 LBS | BODY MASS INDEX: 32.37 KG/M2 | TEMPERATURE: 98.9 F | OXYGEN SATURATION: 93 % | RESPIRATION RATE: 18 BRPM | HEIGHT: 64 IN | HEART RATE: 73 BPM

## 2020-01-20 LAB
ANION GAP SERPL CALCULATED.3IONS-SCNC: 12.5 MMOL/L (ref 5–15)
BACTERIA SPEC AEROBE CULT: NORMAL
BACTERIA SPEC AEROBE CULT: NORMAL
BUN BLD-MCNC: 9 MG/DL (ref 6–20)
BUN/CREAT SERPL: 14.5 (ref 7–25)
CALCIUM SPEC-SCNC: 8.9 MG/DL (ref 8.6–10.5)
CHLORIDE SERPL-SCNC: 105 MMOL/L (ref 98–107)
CO2 SERPL-SCNC: 24.5 MMOL/L (ref 22–29)
CREAT BLD-MCNC: 0.62 MG/DL (ref 0.57–1)
DEPRECATED RDW RBC AUTO: 40.1 FL (ref 37–54)
ERYTHROCYTE [DISTWIDTH] IN BLOOD BY AUTOMATED COUNT: 12.5 % (ref 12.3–15.4)
FERRITIN SERPL-MCNC: 238 NG/ML (ref 13–150)
GFR SERPL CREATININE-BSD FRML MDRD: 100 ML/MIN/1.73
GLUCOSE BLD-MCNC: 109 MG/DL (ref 65–99)
HCT VFR BLD AUTO: 27.4 % (ref 34–46.6)
HGB BLD-MCNC: 9.3 G/DL (ref 12–15.9)
IRON 24H UR-MRATE: 46 MCG/DL (ref 37–145)
IRON SATN MFR SERPL: 19 % (ref 20–50)
MCH RBC QN AUTO: 29.3 PG (ref 26.6–33)
MCHC RBC AUTO-ENTMCNC: 33.9 G/DL (ref 31.5–35.7)
MCV RBC AUTO: 86.4 FL (ref 79–97)
PLATELET # BLD AUTO: 227 10*3/MM3 (ref 140–450)
PMV BLD AUTO: 9.4 FL (ref 6–12)
POTASSIUM BLD-SCNC: 3.8 MMOL/L (ref 3.5–5.2)
RBC # BLD AUTO: 3.17 10*6/MM3 (ref 3.77–5.28)
SODIUM BLD-SCNC: 142 MMOL/L (ref 136–145)
TIBC SERPL-MCNC: 243 MCG/DL (ref 298–536)
TRANSFERRIN SERPL-MCNC: 163 MG/DL (ref 200–360)
WBC NRBC COR # BLD: 3.77 10*3/MM3 (ref 3.4–10.8)

## 2020-01-20 PROCEDURE — 83540 ASSAY OF IRON: CPT | Performed by: INTERNAL MEDICINE

## 2020-01-20 PROCEDURE — 85025 COMPLETE CBC W/AUTO DIFF WBC: CPT | Performed by: INTERNAL MEDICINE

## 2020-01-20 PROCEDURE — 94799 UNLISTED PULMONARY SVC/PX: CPT

## 2020-01-20 PROCEDURE — 84466 ASSAY OF TRANSFERRIN: CPT | Performed by: INTERNAL MEDICINE

## 2020-01-20 PROCEDURE — 80048 BASIC METABOLIC PNL TOTAL CA: CPT | Performed by: INTERNAL MEDICINE

## 2020-01-20 PROCEDURE — 82728 ASSAY OF FERRITIN: CPT | Performed by: INTERNAL MEDICINE

## 2020-01-20 PROCEDURE — 25010000002 ENOXAPARIN PER 10 MG: Performed by: INTERNAL MEDICINE

## 2020-01-20 RX ORDER — CEFDINIR 300 MG/1
300 CAPSULE ORAL 2 TIMES DAILY
Qty: 4 CAPSULE | Refills: 0 | Status: SHIPPED | OUTPATIENT
Start: 2020-01-20 | End: 2020-01-22

## 2020-01-20 RX ORDER — DEXTROMETHORPHAN HYDROBROMIDE AND PROMETHAZINE HYDROCHLORIDE 15; 6.25 MG/5ML; MG/5ML
5 SYRUP ORAL 4 TIMES DAILY PRN
Qty: 118 ML | Refills: 0 | Status: SHIPPED | OUTPATIENT
Start: 2020-01-20 | End: 2020-02-07 | Stop reason: SDUPTHER

## 2020-01-20 RX ORDER — DOXYCYCLINE 100 MG/1
100 CAPSULE ORAL EVERY 12 HOURS SCHEDULED
Qty: 1 CAPSULE | Refills: 0 | Status: SHIPPED | OUTPATIENT
Start: 2020-01-20 | End: 2020-01-21

## 2020-01-20 RX ORDER — OSELTAMIVIR PHOSPHATE 75 MG/1
75 CAPSULE ORAL EVERY 12 HOURS SCHEDULED
Qty: 1 CAPSULE | Refills: 0 | Status: SHIPPED | OUTPATIENT
Start: 2020-01-20 | End: 2020-01-21

## 2020-01-20 RX ADMIN — SODIUM CHLORIDE, PRESERVATIVE FREE 10 ML: 5 INJECTION INTRAVENOUS at 09:45

## 2020-01-20 RX ADMIN — ALBUTEROL SULFATE 2.5 MG: 2.5 SOLUTION RESPIRATORY (INHALATION) at 10:08

## 2020-01-20 RX ADMIN — ENOXAPARIN SODIUM 40 MG: 40 INJECTION SUBCUTANEOUS at 09:44

## 2020-01-20 RX ADMIN — OSELTAMIVIR PHOSPHATE 75 MG: 75 CAPSULE ORAL at 09:44

## 2020-01-20 RX ADMIN — POLYETHYLENE GLYCOL 3350 17 G: 17 POWDER, FOR SOLUTION ORAL at 09:44

## 2020-01-20 RX ADMIN — GABAPENTIN 400 MG: 400 CAPSULE ORAL at 09:44

## 2020-01-20 RX ADMIN — DOXYCYCLINE 100 MG: 100 CAPSULE ORAL at 09:44

## 2020-01-20 NOTE — DISCHARGE SUMMARY
Date of Admission: 1/15/2020  Date of Discharge:  1/20/2020  Primary Care Physician: Iqra Powell MD     Discharge Diagnosis:  Active Hospital Problems    Diagnosis  POA   • **Multifocal pneumonia [J18.9]  Yes   • Influenza [J11.1]  Yes   • Hypokalemia [E87.6]  Yes   • Sleep apnea [G47.30]  Yes   • GERD (gastroesophageal reflux disease) [K21.9]  Yes      Resolved Hospital Problems   No resolved problems to display.       DETAILS OF HOSPITAL STAY     Pertinent Test Results and Procedures Performed  Chest CT:  1. Moderately severe areas of dense pneumonia involving the right upper  lobe and right lower lobe and minimally in the left lower lobe.  2. There is evidence of previous right diaphragm repair with a right  diaphragmatic hernia containing a small portion of the liver as  discussed above.   3. Short-term follow-up CT of the chest in approximately 2 months  recommended.  4. Findings were discussed with Dr. Ceballos.    Chest x-ray on 1/19/2020:  Heart size is in range of normal for technique. Pulmonary  vasculature is unremarkable. Persistent focal opacity at the right mid  lung and patchy opacity at the right lower lung, follow-up recommended  to characterize complete resolution and to exclude any possibility of  underlying lesion. No pleural effusion, or pneumothorax. No acute  osseous process.    Hospital Course  This is a 54-year-old female who presented to the emergency room with shortness of breath and recent diagnosis of influenza.  Please see H&P for full details of admission.  She was found to have a secondary bacterial pneumonia and started on Rocephin along with doxycycline.  Tamiflu was continued.  Over the last 5 days she has had significant improvement.  She is on room air with no complaints of shortness of breath.  Her cough is also improved and she is afebrile.  At this point she is medically stable.  She will complete 2 more days of antibiotics and has 1 dose of Tamiflu left.  Based upon  chest imaging, I have recommended she have a short-term follow-up chest x-ray in 3 to 4 weeks and I think a repeat chest CT sometime thereafter should be considered particularly if her chest x-ray shows non-resolved infiltrate/consolidation.  At this point she is medically stable and will be released home today.    Physical Exam at Discharge:  General: No acute distress, AAOx3  HEENT: EOMI, PERRL  Cardiovascular: +s1 and s2, RRR  Lungs: No rhonchi or wheezing  Abdomen: soft, nontender    Consults:   Consults     Date and Time Order Name Status Description    1/15/2020 2202 [facility] (on-call MD unless specified) Details Completed             Condition on Discharge: Stable, improved    Discharge Disposition  Home or Self Care    Discharge Medications     Discharge Medications      New Medications      Instructions Start Date   cefdinir 300 MG capsule  Commonly known as:  OMNICEF   300 mg, Oral, 2 Times Daily      doxycycline 100 MG capsule  Commonly known as:  MONODOX   100 mg, Oral, Every 12 Hours Scheduled      oseltamivir 75 MG capsule  Commonly known as:  TAMIFLU   75 mg, Oral, Every 12 Hours Scheduled      promethazine-dextromethorphan 6.25-15 MG/5ML syrup  Commonly known as:  PROMETHAZINE-DM   5 mL, Oral, 4 Times Daily PRN         Continue These Medications      Instructions Start Date   albuterol sulfate  (90 Base) MCG/ACT inhaler  Commonly known as:  PROVENTIL HFA;VENTOLIN HFA;PROAIR HFA   2 puffs, Inhalation, Every 4 Hours PRN      buPROPion  MG 24 hr tablet  Commonly known as:  WELLBUTRIN XL   300 mg, Oral, Daily      cyclobenzaprine 10 MG tablet  Commonly known as:  FLEXERIL   10 mg, Oral, 2 Times Daily PRN      FLUoxetine 20 MG capsule  Commonly known as:  PROzac   60 mg, Oral, Nightly      pantoprazole 40 MG EC tablet  Commonly known as:  PROTONIX   40 mg, Oral, Every Evening      traZODone 100 MG tablet  Commonly known as:  DESYREL   TAKE 2 TO 3 TABLET BY MOUTH AT BEDTIME             Discharge  Diet:   Diet Instructions     Diet: Regular      Discharge Diet:  Regular          Activity at Discharge:   Activity Instructions     Activity as Tolerated            Follow-up Appointments  No future appointments.  Additional Instructions for the Follow-ups that You Need to Schedule     Discharge Follow-up with PCP   As directed       Currently Documented PCP:    Iqra Powell MD    PCP Phone Number:    [phone]     Follow Up Details:  1 week with a repeat chest xray in 3-4 weeks               Test Results Pending at Discharge   Order Current Status    Blood Culture - Blood, Arm, Left Preliminary result    Blood Culture - Blood, Arm, Right Preliminary result          I have examined and discussed discharge planning with the patient today.     Andrew Multani MD  01/20/20  12:49 PM    Time: Discharge greater than 30 min

## 2020-01-20 NOTE — PROGRESS NOTES
Continued Stay Note  [facility]     Patient Name: Machelle Ortega  MRN: [id number]  Today's Date: 1/20/2020    Admit Date: 1/15/2020    Discharge Plan     Row Name 01/20/20 1240       Plan    Plan  Home    Patient/Family in Agreement with Plan  yes    Plan Comments  Met with pt at bedside who confirms plan to return home, no known needs.  GALINDO Perez RN        Discharge Codes    No documentation.             Ebony Perez RN

## 2020-01-20 NOTE — PLAN OF CARE
Problem: Patient Care Overview  Goal: Plan of Care Review  Outcome: Ongoing (interventions implemented as appropriate)  Flowsheets  Taken 1/20/2020 0358 by Nara Jiang, RN  Progress: improving  Taken 1/20/2020 0805 by Rachelle Ashley, RN  Plan of Care Reviewed With: patient  Taken 1/20/2020 1421 by Rachelle Ashley, RN  Outcome Summary: VSS, medication given to assist with BM, encourage walking, possible discharge today, [facility]     Problem: Pain, Chronic (Adult)  Goal: Acceptable Pain/Comfort Level and Functional Ability  Outcome: Ongoing (interventions implemented as appropriate)  Flowsheets (Taken 1/19/2020 1655 by Adelita Gonzalez, RN)  Acceptable Pain/Comfort Level and Functional Ability: making progress toward outcome

## 2020-01-20 NOTE — PLAN OF CARE
Problem: Patient Care Overview  Goal: Plan of Care Review  Outcome: Ongoing (interventions implemented as appropriate)  Flowsheets  Taken 1/20/2020 6439  Progress: improving  Outcome Summary: VSS.  No c/o pain. Patient states that she hasn't had a BM in several days.  MOM and prune juice given.  Still no results. Abdomen slightly distended, but soft. Gave cough medicine per request.  Patient wore CPAP most of the night. Continues with tamaflu and antibiotics.  Will continue to monitor.  Taken 1/20/2020 0205  Plan of Care Reviewed With: patient  Goal: Individualization and Mutuality  Outcome: Ongoing (interventions implemented as appropriate)  Goal: Discharge Needs Assessment  Outcome: Ongoing (interventions implemented as appropriate)  Goal: Interprofessional Rounds/Family Conf  Outcome: Ongoing (interventions implemented as appropriate)     Problem: Fall Risk (Adult)  Goal: Identify Related Risk Factors and Signs and Symptoms  Outcome: Ongoing (interventions implemented as appropriate)  Goal: Absence of Fall  Outcome: Ongoing (interventions implemented as appropriate)     Problem: Pain, Chronic (Adult)  Goal: Identify Related Risk Factors and Signs and Symptoms  Outcome: Ongoing (interventions implemented as appropriate)  Goal: Acceptable Pain/Comfort Level and Functional Ability  Outcome: Ongoing (interventions implemented as appropriate)     Problem: Infection, Risk/Actual (Adult)  Goal: Identify Related Risk Factors and Signs and Symptoms  Outcome: Ongoing (interventions implemented as appropriate)  Goal: Infection Prevention/Resolution  Outcome: Ongoing (interventions implemented as appropriate)

## 2020-01-20 NOTE — PAYOR COMM NOTE
"P: [phone]  F: [phone]    CONTINUED STAY REVIEW    REF #[id number]          Machelle Ortega (54 y.o. Female)     Date of Birth Social Security Number Address Home Phone MRN    1965  [address] [facility] [phone] [id number]    Quaker Marital Status          Rastafarian        Admission Date Admission Type Admitting Provider Attending Provider Department, Room/Bed    1/15/20 Emergency Cecil Villalba MD [place], Robert Russell, MD [address], [address]/1    Discharge Date Discharge Disposition Discharge Destination                       Attending Provider:  Andrew Multani MD    Allergies:  Budesonide-formoterol Fumarate, Oxycodone-acetaminophen, Pregabalin    Isolation:  Droplet   Infection:  Influenza (01/16/20)   Code Status:  CPR    Ht:  163 cm (64.17\")   Wt:  86 kg (189 lb 9.5 oz)    Admission Cmt:  None   Principal Problem:  Multifocal pneumonia [J18.9]                 Active Insurance as of 1/15/2020     Primary Coverage     Payor Plan Insurance Group Employer/Plan Group    ANTHEM BLUE CROSS ANTHEM BLUE CROSS BLUE SHIELD PPO [id number]     Payor Plan Address Payor Plan Phone Number Payor Plan Fax Number Effective Dates    [address] [phone]  1/1/2020 - None Entered    [facility] [address]       Subscriber Name Subscriber Birth Date Member ID       DYLON ORTEGA 10/12/1962 [id number]           Secondary Coverage     Payor Plan Insurance Group Employer/Plan Group    MEDICARE MEDICARE A & B      Payor Plan Address Payor Plan Phone Number Payor Plan Fax Number Effective Dates    [address] [phone]  9/1/2008 - None Entered    [facility] [address]       Subscriber Name Subscriber Birth Date Member ID       MACHELLE ORTEGA 1965 [id number]                 Emergency Contacts      (Rel.) Home Phone Work Phone Mobile Phone    Dylon Ortega (Spouse) [phone] -- [phone]              Lines, Drains & " Airways    Active LDAs     Name:   Placement date:   Placement time:   Site:   Days:    Peripheral IV 01/15/20 2100 Left Antecubital   01/15/20    2100    Antecubital   4    Peripheral IV 01/15/20 2355 Right;Lateral Antecubital   01/15/20    2355    Antecubital   4                       Physician Progress Notes      Vera Rosenberg MD at 20 1342            Name: Machelle Ortega ADMIT: 1/15/2020   : 1965  PCP: Iqra Powell MD    MRN: [id number] LOS: 2 days   AGE/SEX: 54 y.o. female  ROOM: [facility]     Subjective   Subjective   Patient was seen and examined at bedside no acute distress Exam improvement today  Objective   Objective   Temp:  [97.9 °F (36.6 °C)-99.4 °F (37.4 °C)] 99.3 °F (37.4 °C)  Heart Rate:  [51-66] 66  Resp:  [16-20] 18  BP: (103-110)/(51-67) 110/67  SpO2:  [93 %-97 %] 94 %  on   ;   Device (Oxygen Therapy): room air  Body mass index is 32.37 kg/m².    Physical Exam  Alert awake oriented no acute distress  HEENT normocephalic atraumatic neck PERRLA  Cardio S1-S2 appreciated regular rate and rhythm  Chest clear breath sounds  Abdomen soft nontender nondistended positive bowel sounds  Extremities no edema positive peripheral pulses  Results Review:       I reviewed the patient's new clinical results.  Results from last 7 days   Lab Units 20  0502   WBC 10*3/mm3 2.61* 2.72* 3.54 3.91   HEMOGLOBIN g/dL 8.6* 8.9* 9.7* 11.1*   PLATELETS 10*3/mm3 179 167 147 143     Results from last 7 days   Lab Units 208 20  0502   SODIUM mmol/L 144 142 139 132*   POTASSIUM mmol/L 3.6 4.0 4.0 3.0*   CHLORIDE mmol/L 109* 109* 107 97*   CO2 mmol/L 22.6 21.5* 20.1* 19.7*   BUN mg/dL 13 14 21* 28*   CREATININE mg/dL 0.67 0.60 0.75 1.04*   GLUCOSE mg/dL 97 89 101* 103*   Estimated Creatinine Clearance: 102.3 mL/min (by C-G formula based on SCr of 0.67 mg/dL).  Results from last 7 days   Lab Units 20  9490  01/18/20  0512 01/17/20  0428 01/16/20  0502 01/15/20  2112   CALCIUM mg/dL 8.7 8.6 8.6 8.8 9.7   ALBUMIN g/dL  --   --   --   --  4.00     Results from last 7 days   Lab Units 01/15/20  2216   LACTATE mmol/L 1.5   No results found for: STREPPNEUAG, LEGANTIGENUR  Results from last 7 days   Lab Units 01/16/20  1608 01/15/20  2223   BLOODCX   --  No growth at 3 days   RESPCX  Heavy growth (4+) Normal Respiratory Princess  --            buPROPion  mg Oral Q PM   cefTRIAXone 1 g Intravenous Q24H   doxycycline 100 mg Oral Q12H   enoxaparin 40 mg Subcutaneous Q24H   FLUoxetine 60 mg Oral Nightly   gabapentin 400 mg Oral TID   oseltamivir 75 mg Oral Q12H   pantoprazole 40 mg Oral Q PM   polyethylene glycol 17 g Oral Daily   sodium chloride 10 mL Intravenous Q12H   traZODone 200 mg Oral Nightly      Diet Regular        Assessment/Plan     Active Hospital Problems    Diagnosis  POA   • **Multifocal pneumonia [J18.9]  Yes   • Influenza [J11.1]  Yes   • Hypokalemia [E87.6]  Yes   • Sleep apnea [G47.30]  Yes   • GERD (gastroesophageal reflux disease) [K21.9]  Yes      Resolved Hospital Problems   No resolved problems to display.       54 y.o. female with Multifocal pneumonia.  1.  Multifocal pneumonia is evaluated on the CT of the chest with continue azithromycin and Rocephin.  Chest x-ray reviewed.   we will continue Tamiflu as well as nebulizer treatments and oxygen therapy.  DC fluids. Tylenol as needed any fevers also get a sputum sample for culture.  Sputum sample showing mixed princess.  Breathing has improved today he is off oxygen therapy.    2.  DVT prophylaxis is Lovenox  3 hypokalemia resolved  4.  Frequent PVCs echocardiogram shows normal ejection fraction  #5 anemia patient's hemoglobin is low she states she does have chronic anemia and never needed a blood transfusion in the past.  We will go ahead and get an occult blood and iron studies      Vera Rosenberg MD  [facility]  01/19/20  1:43  PM    Electronically signed by Vera Rosenberg MD at 01/19/20 134           Adelita Gonzalez, RN   Registered Nurse   Vascular Surgery   Plan of Care   Signed   Date of Service:  01/19/20 1658   Creation Time:  01/19/20 1658            Signed             Pt VSS. Expiratory wheezes this am and pt c/o soa despite O2 sats being 99%. Pt gets relief with prn Breathing tx. Encouraging ambulation and incentive spirometry. Abx continue. Pt c/o being tired. No bm yet today. Miralax started. Fluids encouraged. Stool specimen needed. Pt aware. Continue to monitor.

## 2020-01-21 NOTE — PROGRESS NOTES
Case Management Discharge Note      Final Note: Pt discharged home, no known needs.  jose milian RN         Destination      No service has been selected for the patient.      Durable Medical Equipment      No service has been selected for the patient.      Dialysis/Infusion      No service has been selected for the patient.      Home Medical Care      No service has been selected for the patient.      Therapy      No service has been selected for the patient.      Community Resources      No service has been selected for the patient.        Transportation Services  Private: Car    Final Discharge Disposition Code: 01 - home or self-care

## 2020-01-21 NOTE — PAYOR COMM NOTE
"P: [phone]  F: [phone]    REF #[id number]    DISCHARGED        Machelle Ortega (54 y.o. Female)     Date of Birth Social Security Number Address Home Phone MRN    1965  [address] [facility] [address] [phone] [id number]    Restoration Marital Status          Oriental orthodox        Admission Date Admission Type Admitting Provider Attending Provider Department, Room/Bed    1/15/20 Emergency Cecil Villalba MD  [address], [address]    Discharge Date Discharge Disposition Discharge Destination        1/20/2020 Home or Self Care              Attending Provider:  (none)   Allergies:  Budesonide-formoterol Fumarate, Oxycodone-acetaminophen, Pregabalin    Isolation:  Droplet   Infection:  Influenza (01/16/20)   Code Status:  Prior    Ht:  163 cm (64.17\")   Wt:  86 kg (189 lb 9.5 oz)    Admission Cmt:  None   Principal Problem:  Multifocal pneumonia [J18.9]                 Active Insurance as of 1/15/2020     Primary Coverage     Payor Plan Insurance Group Employer/Plan Group    ANTHEM BLUE CROSS ANTHEM BLUE CROSS BLUE SHIELD PPO [id number]     Payor Plan Address Payor Plan Phone Number Payor Plan Fax Number Effective Dates    [address] [phone]  1/1/2020 - None Entered    [facility] [place] [address]       Subscriber Name Subscriber Birth Date Member ID       DYLON ORTEGA 10/12/1962 [id number]           Secondary Coverage     Payor Plan Insurance Group Employer/Plan Group    MEDICARE MEDICARE A & B      Payor Plan Address Payor Plan Phone Number Payor Plan Fax Number Effective Dates    [address] [phone]  9/1/2008 - None Entered    [facility] [id number]       Subscriber Name Subscriber Birth Date Member ID       MACHELLE ORTEGA 1965 [id number]                 Emergency Contacts      (Rel.) Home Phone Work Phone Mobile Phone    Dylon Ortega (Spouse) [phone] -- [phone]               Discharge Summary      Andrew Multani MD " at 01/20/20 1249            Date of Admission: 1/15/2020  Date of Discharge:  1/20/2020  Primary Care Physician: Iqra Powell MD     Discharge Diagnosis:  Active Hospital Problems    Diagnosis  POA   • **Multifocal pneumonia [J18.9]  Yes   • Influenza [J11.1]  Yes   • Hypokalemia [E87.6]  Yes   • Sleep apnea [G47.30]  Yes   • GERD (gastroesophageal reflux disease) [K21.9]  Yes      Resolved Hospital Problems   No resolved problems to display.       DETAILS OF HOSPITAL STAY     Pertinent Test Results and Procedures Performed  Chest CT:  1. Moderately severe areas of dense pneumonia involving the right upper  lobe and right lower lobe and minimally in the left lower lobe.  2. There is evidence of previous right diaphragm repair with a right  diaphragmatic hernia containing a small portion of the liver as  discussed above.   3. Short-term follow-up CT of the chest in approximately 2 months  recommended.  4. Findings were discussed with Dr. Ceballos.    Chest x-ray on 1/19/2020:  Heart size is in range of normal for technique. Pulmonary  vasculature is unremarkable. Persistent focal opacity at the right mid  lung and patchy opacity at the right lower lung, follow-up recommended  to characterize complete resolution and to exclude any possibility of  underlying lesion. No pleural effusion, or pneumothorax. No acute  osseous process.    Hospital Course  This is a 54-year-old female who presented to the emergency room with shortness of breath and recent diagnosis of influenza.  Please see H&P for full details of admission.  She was found to have a secondary bacterial pneumonia and started on Rocephin along with doxycycline.  Tamiflu was continued.  Over the last 5 days she has had significant improvement.  She is on room air with no complaints of shortness of breath.  Her cough is also improved and she is afebrile.  At this point she is medically stable.  She will complete 2 more days of antibiotics and has 1 dose of  Tamiflu left.  Based upon chest imaging, I have recommended she have a short-term follow-up chest x-ray in 3 to 4 weeks and I think a repeat chest CT sometime thereafter should be considered particularly if her chest x-ray shows non-resolved infiltrate/consolidation.  At this point she is medically stable and will be released home today.    Physical Exam at Discharge:  General: No acute distress, AAOx3  HEENT: EOMI, PERRL  Cardiovascular: +s1 and s2, RRR  Lungs: No rhonchi or wheezing  Abdomen: soft, nontender    Consults:   Consults     Date and Time Order Name Status Description    1/15/2020 2202 [facility] (on-call MD unless specified) Details Completed             Condition on Discharge: Stable, improved    Discharge Disposition  Home or Self Care    Discharge Medications     Discharge Medications      New Medications      Instructions Start Date   cefdinir 300 MG capsule  Commonly known as:  OMNICEF   300 mg, Oral, 2 Times Daily      doxycycline 100 MG capsule  Commonly known as:  MONODOX   100 mg, Oral, Every 12 Hours Scheduled      oseltamivir 75 MG capsule  Commonly known as:  TAMIFLU   75 mg, Oral, Every 12 Hours Scheduled      promethazine-dextromethorphan 6.25-15 MG/5ML syrup  Commonly known as:  PROMETHAZINE-DM   5 mL, Oral, 4 Times Daily PRN         Continue These Medications      Instructions Start Date   albuterol sulfate  (90 Base) MCG/ACT inhaler  Commonly known as:  PROVENTIL HFA;VENTOLIN HFA;PROAIR HFA   2 puffs, Inhalation, Every 4 Hours PRN      buPROPion  MG 24 hr tablet  Commonly known as:  WELLBUTRIN XL   300 mg, Oral, Daily      cyclobenzaprine 10 MG tablet  Commonly known as:  FLEXERIL   10 mg, Oral, 2 Times Daily PRN      FLUoxetine 20 MG capsule  Commonly known as:  PROzac   60 mg, Oral, Nightly      pantoprazole 40 MG EC tablet  Commonly known as:  PROTONIX   40 mg, Oral, Every Evening      traZODone 100 MG tablet  Commonly known as:  DESYREL   TAKE 2 TO 3 TABLET BY MOUTH AT  BEDTIME             Discharge Diet:   Diet Instructions     Diet: Regular      Discharge Diet:  Regular          Activity at Discharge:   Activity Instructions     Activity as Tolerated            Follow-up Appointments  No future appointments.  Additional Instructions for the Follow-ups that You Need to Schedule     Discharge Follow-up with PCP   As directed       Currently Documented PCP:    Iqra Powell MD    PCP Phone Number:    [phone]     Follow Up Details:  1 week with a repeat chest xray in 3-4 weeks               Test Results Pending at Discharge   Order Current Status    Blood Culture - Blood, Arm, Left Preliminary result    Blood Culture - Blood, Arm, Right Preliminary result          I have examined and discussed discharge planning with the patient today.     Andrew Multani MD  01/20/20  12:49 PM    Time: Discharge greater than 30 min            Electronically signed by Andrew Multani MD at 01/20/20 9320

## 2020-01-28 ENCOUNTER — TELEPHONE (OUTPATIENT)
Dept: INTERNAL MEDICINE | Facility: CLINIC | Age: 55
End: 2020-01-28

## 2020-01-28 NOTE — TELEPHONE ENCOUNTER
----- Message from Crystal Braden sent at 1/28/2020  1:13 PM EST -----  The patient was calling to schedule a hospital follow up and repeat x-rays from her 1/15/2020 [facility] in-patient stay. I informed her I would send her MA a message to give her a call back to schedule since 's schedule is full. Her call back is [phone]. Thank you.

## 2020-01-31 ENCOUNTER — APPOINTMENT (OUTPATIENT)
Dept: GENERAL RADIOLOGY | Facility: HOSPITAL | Age: 55
End: 2020-01-31

## 2020-01-31 ENCOUNTER — TELEPHONE (OUTPATIENT)
Dept: INTERNAL MEDICINE | Facility: CLINIC | Age: 55
End: 2020-01-31

## 2020-01-31 ENCOUNTER — HOSPITAL ENCOUNTER (EMERGENCY)
Facility: HOSPITAL | Age: 55
Discharge: HOME | End: 2020-01-31
Attending: EMERGENCY MEDICINE | Admitting: EMERGENCY MEDICINE

## 2020-01-31 VITALS
RESPIRATION RATE: 16 BRPM | DIASTOLIC BLOOD PRESSURE: 70 MMHG | SYSTOLIC BLOOD PRESSURE: 125 MMHG | HEART RATE: 64 BPM | TEMPERATURE: 98.6 F | OXYGEN SATURATION: 100 %

## 2020-01-31 DIAGNOSIS — Z87.01 HISTORY OF COMMUNITY ACQUIRED PNEUMONIA: ICD-10-CM

## 2020-01-31 DIAGNOSIS — R05.9 COUGH IN ADULT: ICD-10-CM

## 2020-01-31 DIAGNOSIS — R06.2 WHEEZING: Primary | ICD-10-CM

## 2020-01-31 LAB
ALBUMIN SERPL-MCNC: 4.1 G/DL (ref 3.5–5.2)
ALBUMIN/GLOB SERPL: 1.6 G/DL
ALP SERPL-CCNC: 91 U/L (ref 39–117)
ALT SERPL W P-5'-P-CCNC: 30 U/L (ref 1–33)
ANION GAP SERPL CALCULATED.3IONS-SCNC: 12.4 MMOL/L (ref 5–15)
AST SERPL-CCNC: 23 U/L (ref 1–32)
BASOPHILS # BLD AUTO: 0.05 10*3/MM3 (ref 0–0.2)
BASOPHILS NFR BLD AUTO: 1.6 % (ref 0–1.5)
BILIRUB SERPL-MCNC: 0.2 MG/DL (ref 0.2–1.2)
BILIRUB UR QL STRIP: NEGATIVE
BUN BLD-MCNC: 16 MG/DL (ref 6–20)
BUN/CREAT SERPL: 19.8 (ref 7–25)
CALCIUM SPEC-SCNC: 9.1 MG/DL (ref 8.6–10.5)
CHLORIDE SERPL-SCNC: 102 MMOL/L (ref 98–107)
CLARITY UR: CLEAR
CO2 SERPL-SCNC: 24.6 MMOL/L (ref 22–29)
COLOR UR: YELLOW
CREAT BLD-MCNC: 0.81 MG/DL (ref 0.57–1)
DEPRECATED RDW RBC AUTO: 38.8 FL (ref 37–54)
EOSINOPHIL # BLD AUTO: 0.02 10*3/MM3 (ref 0–0.4)
EOSINOPHIL NFR BLD AUTO: 0.6 % (ref 0.3–6.2)
ERYTHROCYTE [DISTWIDTH] IN BLOOD BY AUTOMATED COUNT: 12.3 % (ref 12.3–15.4)
GFR SERPL CREATININE-BSD FRML MDRD: 74 ML/MIN/1.73
GLOBULIN UR ELPH-MCNC: 2.6 GM/DL
GLUCOSE BLD-MCNC: 87 MG/DL (ref 65–99)
GLUCOSE UR STRIP-MCNC: NEGATIVE MG/DL
HCT VFR BLD AUTO: 33.2 % (ref 34–46.6)
HGB BLD-MCNC: 10.8 G/DL (ref 12–15.9)
HGB UR QL STRIP.AUTO: NEGATIVE
IMM GRANULOCYTES # BLD AUTO: 0.01 10*3/MM3 (ref 0–0.05)
IMM GRANULOCYTES NFR BLD AUTO: 0.3 % (ref 0–0.5)
KETONES UR QL STRIP: NEGATIVE
LEUKOCYTE ESTERASE UR QL STRIP.AUTO: NEGATIVE
LYMPHOCYTES # BLD AUTO: 1.1 10*3/MM3 (ref 0.7–3.1)
LYMPHOCYTES NFR BLD AUTO: 34.6 % (ref 19.6–45.3)
MCH RBC QN AUTO: 28.1 PG (ref 26.6–33)
MCHC RBC AUTO-ENTMCNC: 32.5 G/DL (ref 31.5–35.7)
MCV RBC AUTO: 86.5 FL (ref 79–97)
MONOCYTES # BLD AUTO: 0.31 10*3/MM3 (ref 0.1–0.9)
MONOCYTES NFR BLD AUTO: 9.7 % (ref 5–12)
NEUTROPHILS # BLD AUTO: 1.69 10*3/MM3 (ref 1.7–7)
NEUTROPHILS NFR BLD AUTO: 53.2 % (ref 42.7–76)
NITRITE UR QL STRIP: NEGATIVE
NRBC BLD AUTO-RTO: 0 /100 WBC (ref 0–0.2)
NT-PROBNP SERPL-MCNC: 68.3 PG/ML (ref 5–900)
PH UR STRIP.AUTO: 5.5 [PH] (ref 5–8)
PLATELET # BLD AUTO: 309 10*3/MM3 (ref 140–450)
PMV BLD AUTO: 8.5 FL (ref 6–12)
POTASSIUM BLD-SCNC: 3.8 MMOL/L (ref 3.5–5.2)
PROCALCITONIN SERPL-MCNC: 0.08 NG/ML (ref 0.1–0.25)
PROT SERPL-MCNC: 6.7 G/DL (ref 6–8.5)
PROT UR QL STRIP: NEGATIVE
RBC # BLD AUTO: 3.84 10*6/MM3 (ref 3.77–5.28)
SODIUM BLD-SCNC: 139 MMOL/L (ref 136–145)
SP GR UR STRIP: 1.01 (ref 1–1.03)
TROPONIN T SERPL-MCNC: <0.01 NG/ML (ref 0–0.03)
UROBILINOGEN UR QL STRIP: NORMAL
WBC NRBC COR # BLD: 3.18 10*3/MM3 (ref 3.4–10.8)

## 2020-01-31 PROCEDURE — 83880 ASSAY OF NATRIURETIC PEPTIDE: CPT | Performed by: EMERGENCY MEDICINE

## 2020-01-31 PROCEDURE — 81003 URINALYSIS AUTO W/O SCOPE: CPT | Performed by: EMERGENCY MEDICINE

## 2020-01-31 PROCEDURE — 96360 HYDRATION IV INFUSION INIT: CPT

## 2020-01-31 PROCEDURE — 85025 COMPLETE CBC W/AUTO DIFF WBC: CPT | Performed by: EMERGENCY MEDICINE

## 2020-01-31 PROCEDURE — 99283 EMERGENCY DEPT VISIT LOW MDM: CPT

## 2020-01-31 PROCEDURE — 94640 AIRWAY INHALATION TREATMENT: CPT

## 2020-01-31 PROCEDURE — 80053 COMPREHEN METABOLIC PANEL: CPT | Performed by: EMERGENCY MEDICINE

## 2020-01-31 PROCEDURE — 93005 ELECTROCARDIOGRAM TRACING: CPT | Performed by: EMERGENCY MEDICINE

## 2020-01-31 PROCEDURE — 71046 X-RAY EXAM CHEST 2 VIEWS: CPT

## 2020-01-31 PROCEDURE — 84484 ASSAY OF TROPONIN QUANT: CPT | Performed by: EMERGENCY MEDICINE

## 2020-01-31 PROCEDURE — 84145 PROCALCITONIN (PCT): CPT | Performed by: EMERGENCY MEDICINE

## 2020-01-31 PROCEDURE — 94799 UNLISTED PULMONARY SVC/PX: CPT

## 2020-01-31 PROCEDURE — 93010 ELECTROCARDIOGRAM REPORT: CPT | Performed by: INTERNAL MEDICINE

## 2020-01-31 RX ORDER — GABAPENTIN 300 MG/1
300 CAPSULE ORAL 3 TIMES DAILY
COMMUNITY
End: 2020-02-07 | Stop reason: SDUPTHER

## 2020-01-31 RX ORDER — SODIUM CHLORIDE 0.9 % (FLUSH) 0.9 %
10 SYRINGE (ML) INJECTION AS NEEDED
Status: DISCONTINUED | OUTPATIENT
Start: 2020-01-31 | End: 2020-01-31 | Stop reason: HOSPADM

## 2020-01-31 RX ORDER — ALBUTEROL SULFATE 1.25 MG/3ML
1 SOLUTION RESPIRATORY (INHALATION) EVERY 6 HOURS PRN
Qty: 30 VIAL | Refills: 0 | Status: SHIPPED | OUTPATIENT
Start: 2020-01-31 | End: 2021-05-03 | Stop reason: SDUPTHER

## 2020-01-31 RX ORDER — ALBUTEROL SULFATE 2.5 MG/3ML
2.5 SOLUTION RESPIRATORY (INHALATION) ONCE
Status: COMPLETED | OUTPATIENT
Start: 2020-01-31 | End: 2020-01-31

## 2020-01-31 RX ORDER — SODIUM CHLORIDE 9 MG/ML
125 INJECTION, SOLUTION INTRAVENOUS CONTINUOUS
Status: DISCONTINUED | OUTPATIENT
Start: 2020-01-31 | End: 2020-01-31 | Stop reason: HOSPADM

## 2020-01-31 RX ADMIN — ALBUTEROL SULFATE 2.5 MG: 2.5 SOLUTION RESPIRATORY (INHALATION) at 14:01

## 2020-01-31 RX ADMIN — SODIUM CHLORIDE 500 ML: 9 INJECTION, SOLUTION INTRAVENOUS at 13:53

## 2020-01-31 RX ADMIN — SODIUM CHLORIDE 125 ML/HR: 9 INJECTION, SOLUTION INTRAVENOUS at 14:50

## 2020-01-31 NOTE — TELEPHONE ENCOUNTER
I spoke to Ms. Ortega and she stated she is still having SOB/ Coughing/ and unable to get a deep breath.     I told her that our office is closing early today and our morning acute clinic is full and we will not have the afternoon clinic today.    I advised her to go to the nearest ER or urgent care for further evaluation as Dr. Powell would not want her to go the weekend with her symptoms.     She verbalized she understood and would get further evaluation.    She does have a f/u appointment on 2/7/2020.

## 2020-01-31 NOTE — TELEPHONE ENCOUNTER
----- Message from Lorena Delarosa sent at 1/31/2020  9:24 AM EST -----  Contact: pt  Pt is scheduled for 2/7 and she has been out of hospital for 6 days.  She does not feel like pneumonia is getting any better.   Called in to see if she could be seen today, given the office transition we have schedules closed for the afternoon.  Please advise if she should be seen with UC or worked in.    Pt# [phone]

## 2020-02-07 ENCOUNTER — OFFICE VISIT (OUTPATIENT)
Dept: INTERNAL MEDICINE | Facility: CLINIC | Age: 55
End: 2020-02-07

## 2020-02-07 VITALS
HEART RATE: 77 BPM | RESPIRATION RATE: 16 BRPM | HEIGHT: 65 IN | SYSTOLIC BLOOD PRESSURE: 120 MMHG | TEMPERATURE: 94 F | DIASTOLIC BLOOD PRESSURE: 82 MMHG | OXYGEN SATURATION: 95 % | WEIGHT: 193.8 LBS | BODY MASS INDEX: 32.29 KG/M2

## 2020-02-07 DIAGNOSIS — D64.9 ANEMIA, UNSPECIFIED TYPE: ICD-10-CM

## 2020-02-07 DIAGNOSIS — K21.9 GASTROESOPHAGEAL REFLUX DISEASE, ESOPHAGITIS PRESENCE NOT SPECIFIED: ICD-10-CM

## 2020-02-07 DIAGNOSIS — R05.9 COUGH: ICD-10-CM

## 2020-02-07 DIAGNOSIS — R06.02 BREATH SHORTNESS: ICD-10-CM

## 2020-02-07 DIAGNOSIS — J18.9 FREQUENT EPISODES OF PNEUMONIA: Primary | ICD-10-CM

## 2020-02-07 DIAGNOSIS — E78.5 HYPERLIPIDEMIA, UNSPECIFIED HYPERLIPIDEMIA TYPE: ICD-10-CM

## 2020-02-07 DIAGNOSIS — F51.01 PRIMARY INSOMNIA: ICD-10-CM

## 2020-02-07 DIAGNOSIS — G89.29 OTHER CHRONIC PAIN: ICD-10-CM

## 2020-02-07 DIAGNOSIS — J40 FREQUENT EPISODES OF BRONCHITIS: ICD-10-CM

## 2020-02-07 DIAGNOSIS — M79.7 FIBROMYALGIA: ICD-10-CM

## 2020-02-07 DIAGNOSIS — R53.82 CHRONIC FATIGUE: ICD-10-CM

## 2020-02-07 DIAGNOSIS — F41.9 ANXIETY: ICD-10-CM

## 2020-02-07 DIAGNOSIS — J18.9 MULTIFOCAL PNEUMONIA: ICD-10-CM

## 2020-02-07 PROCEDURE — 99214 OFFICE O/P EST MOD 30 MIN: CPT | Performed by: INTERNAL MEDICINE

## 2020-02-07 RX ORDER — PANTOPRAZOLE SODIUM 40 MG/1
40 TABLET, DELAYED RELEASE ORAL EVERY EVENING
Qty: 90 TABLET | Refills: 2 | Status: SHIPPED | OUTPATIENT
Start: 2020-02-07 | End: 2021-01-04 | Stop reason: SDUPTHER

## 2020-02-07 RX ORDER — FLUOXETINE HYDROCHLORIDE 20 MG/1
60 CAPSULE ORAL NIGHTLY
Qty: 270 CAPSULE | Refills: 2 | Status: SHIPPED | OUTPATIENT
Start: 2020-02-07 | End: 2021-02-24

## 2020-02-07 RX ORDER — DEXTROMETHORPHAN HYDROBROMIDE AND PROMETHAZINE HYDROCHLORIDE 15; 6.25 MG/5ML; MG/5ML
5 SYRUP ORAL 4 TIMES DAILY PRN
Qty: 118 ML | Refills: 0 | Status: SHIPPED | OUTPATIENT
Start: 2020-02-07 | End: 2020-05-21

## 2020-02-07 RX ORDER — BUPROPION HYDROCHLORIDE 300 MG/1
300 TABLET ORAL DAILY
Qty: 90 TABLET | Refills: 1 | Status: SHIPPED | OUTPATIENT
Start: 2020-02-07 | End: 2020-09-21

## 2020-02-07 RX ORDER — GABAPENTIN 300 MG/1
300 CAPSULE ORAL 3 TIMES DAILY
Qty: 270 CAPSULE | Refills: 1 | OUTPATIENT
Start: 2020-02-07 | End: 2020-05-21

## 2020-02-07 RX ORDER — TRAZODONE HYDROCHLORIDE 100 MG/1
TABLET ORAL
Qty: 270 TABLET | Refills: 2 | Status: SHIPPED | OUTPATIENT
Start: 2020-02-07 | End: 2021-12-07 | Stop reason: SDUPTHER

## 2020-02-07 NOTE — PROGRESS NOTES
"Subjective   Machelle Ortega is a 54 y.o. female here for   Chief Complaint   Patient presents with   • Pneumonia     Hospital Follow-up 1/15/20-1/20/20   • Cough   • Fatigue   • Shortness of Breath   .    Vitals:    02/07/20 1117   BP: 120/82   BP Location: Left arm   Patient Position: Sitting   Cuff Size: Adult   Pulse: 77   Resp: 16   Temp: 94 °F (34.4 °C)   TempSrc: Temporal   SpO2: 95%   Weight: 87.9 kg (193 lb 12.8 oz)   Height: 163.8 cm (64.5\")       Body mass index is 32.75 kg/m².    Pneumonia   She complains of cough (with lying down), difficulty breathing, frequent throat clearing, hoarse voice, shortness of breath, sputum production and wheezing. There is no chest tightness or hemoptysis. This is a recurrent problem. The current episode started more than 1 month ago. The problem occurs intermittently. The problem has been waxing and waning. The cough is productive of sputum. Associated symptoms include appetite change, dyspnea on exertion, malaise/fatigue, myalgias (no change), nasal congestion, orthopnea, PND, postnasal drip, rhinorrhea, sweats and weight loss. Pertinent negatives include no chest pain, ear congestion, ear pain, fever, headaches, heartburn, sneezing, sore throat or trouble swallowing. Her symptoms are aggravated by lying down. She reports moderate improvement on treatment. Her past medical history is significant for asthma, bronchitis and pneumonia. There is no history of COPD or emphysema.   Cough   This is a chronic problem. The current episode started more than 1 month ago. The problem has been waxing and waning. Associated symptoms include myalgias (no change), nasal congestion, postnasal drip, rhinorrhea, shortness of breath, sweats, weight loss and wheezing. Pertinent negatives include no chest pain, chills, ear congestion, ear pain, fever, headaches, heartburn, hemoptysis or sore throat. Her past medical history is significant for asthma, bronchitis and pneumonia. There is no history " of COPD or emphysema.   Fatigue   This is a chronic problem. The current episode started more than 1 year ago. The problem occurs constantly. The problem has been unchanged. Associated symptoms include coughing (with lying down), fatigue and myalgias (no change). Pertinent negatives include no chest pain, chills, fever, headaches or sore throat.   Shortness of Breath   This is a recurrent problem. The current episode started more than 1 month ago. The problem occurs intermittently. The problem has been waxing and waning. Associated symptoms include PND, rhinorrhea, sputum production and wheezing. Pertinent negatives include no chest pain, ear pain, fever, headaches, hemoptysis, leg swelling or sore throat. Her past medical history is significant for asthma and pneumonia. There is no history of COPD.        The following portions of the patient's history were reviewed and updated as appropriate: allergies, current medications, past social history and problem list.    Review of Systems   Constitutional: Positive for appetite change, fatigue, malaise/fatigue and weight loss. Negative for chills and fever.   HENT: Positive for hoarse voice, postnasal drip and rhinorrhea. Negative for ear pain, sneezing, sore throat and trouble swallowing.    Respiratory: Positive for cough (with lying down), sputum production, shortness of breath and wheezing. Negative for hemoptysis.    Cardiovascular: Positive for dyspnea on exertion and PND. Negative for chest pain, palpitations and leg swelling.   Gastrointestinal: Negative for heartburn.   Musculoskeletal: Positive for myalgias (no change).   Neurological: Negative for headaches.   Psychiatric/Behavioral: Positive for dysphoric mood and sleep disturbance. The patient is nervous/anxious.        Objective   Physical Exam   Constitutional: She appears well-developed and well-nourished. No distress.   HENT:   Head: Normocephalic.   Right Ear: External ear normal. Tympanic membrane is  bulging. Tympanic membrane is not erythematous.   Left Ear: External ear normal. Tympanic membrane is bulging. Tympanic membrane is not erythematous.   Nose: Right sinus exhibits no frontal sinus tenderness. Left sinus exhibits no frontal sinus tenderness.   Mouth/Throat: Oropharynx is clear and moist. No oropharyngeal exudate.   Neck: Normal range of motion. Neck supple.   Cardiovascular: Normal rate, regular rhythm and normal heart sounds.   Pulmonary/Chest: Effort normal and breath sounds normal. No respiratory distress. She has no wheezes. She has no rales. She exhibits no tenderness.   Musculoskeletal: She exhibits no edema.   Lymphadenopathy:     She has no cervical adenopathy.   Psychiatric: She has a normal mood and affect. Her behavior is normal.   Nursing note and vitals reviewed.      Assessment/Plan   Diagnoses and all orders for this visit:    Frequent episodes of pneumonia  Comments:  Refer to pulmonary  Orders:  -     Ambulatory Referral to Pulmonology    Frequent episodes of bronchitis  Comments:  Continue inhalers-refer to pulmonary.  Orders:  -     Ambulatory Referral to Pulmonology    Breath shortness  Comments:  Refer to pulmonary.    Chronic fatigue  Comments:  From fibromyalgia? -call if worse.  Orders:  -     CBC Auto Differential  -     Comprehensive Metabolic Panel  -     TSH  -     Vitamin B12 & Folate    Fibromyalgia    Other chronic pain  Comments:  Continue gabapentin because she has no side effects.  Orders:  -     gabapentin (NEURONTIN) 300 MG capsule; Take 1 capsule by mouth 3 (Three) Times a Day.    Multifocal pneumonia    Anemia, unspecified type  Comments:  Low iron in the hospital-need recheck (ferritin was high because of inflammation)  Orders:  -     CBC Auto Differential  -     Vitamin B12 & Folate    Hyperlipidemia, unspecified hyperlipidemia type  Comments:  Need recheck.  Orders:  -     Lipid Panel    Primary insomnia  Comments:  controlled  Orders:  -     traZODone  (DESYREL) 100 MG tablet; TAKE 2 TO 3 TABLET BY MOUTH AT BEDTIME    Cough  -     promethazine-dextromethorphan (PROMETHAZINE-DM) 6.25-15 MG/5ML syrup; Take 5 mL by mouth 4 (Four) Times a Day As Needed for Cough.    Gastroesophageal reflux disease, esophagitis presence not specified  Comments:  ok with med.  Orders:  -     pantoprazole (PROTONIX) 40 MG EC tablet; Take 1 tablet by mouth Every Evening.    Anxiety  Comments:  controlled - call if worse.  Orders:  -     buPROPion XL (WELLBUTRIN XL) 300 MG 24 hr tablet; Take 1 tablet by mouth Daily.  -     FLUoxetine (PROzac) 20 MG capsule; Take 3 capsules by mouth Every Night.     She was discharged 1/31/2020 after flu & community aquired pneumonia.    She had been discharged 1/20/2020.     Current outpatient and discharge medications have been reconciled for the patient.  Reviewed by: Iqra Powell MD

## 2020-02-08 LAB
ALBUMIN SERPL-MCNC: 4.3 G/DL (ref 3.5–5.2)
ALBUMIN/GLOB SERPL: 1.8 G/DL
ALP SERPL-CCNC: 92 U/L (ref 39–117)
ALT SERPL-CCNC: 17 U/L (ref 1–33)
AST SERPL-CCNC: 19 U/L (ref 1–32)
BASOPHILS # BLD AUTO: 0.03 10*3/MM3 (ref 0–0.2)
BASOPHILS NFR BLD AUTO: 0.9 % (ref 0–1.5)
BILIRUB SERPL-MCNC: 0.2 MG/DL (ref 0.2–1.2)
BUN SERPL-MCNC: 13 MG/DL (ref 6–20)
BUN/CREAT SERPL: 16.9 (ref 7–25)
CALCIUM SERPL-MCNC: 9.6 MG/DL (ref 8.6–10.5)
CHLORIDE SERPL-SCNC: 105 MMOL/L (ref 98–107)
CHOLEST SERPL-MCNC: 192 MG/DL (ref 0–200)
CO2 SERPL-SCNC: 25.7 MMOL/L (ref 22–29)
CREAT SERPL-MCNC: 0.77 MG/DL (ref 0.57–1)
EOSINOPHIL # BLD AUTO: 0.09 10*3/MM3 (ref 0–0.4)
EOSINOPHIL NFR BLD AUTO: 2.8 % (ref 0.3–6.2)
ERYTHROCYTE [DISTWIDTH] IN BLOOD BY AUTOMATED COUNT: 12.4 % (ref 12.3–15.4)
FOLATE SERPL-MCNC: 12.8 NG/ML (ref 4.78–24.2)
GLOBULIN SER CALC-MCNC: 2.4 GM/DL
GLUCOSE SERPL-MCNC: 89 MG/DL (ref 65–99)
HCT VFR BLD AUTO: 35.6 % (ref 34–46.6)
HDLC SERPL-MCNC: 40 MG/DL (ref 40–60)
HGB BLD-MCNC: 11.9 G/DL (ref 12–15.9)
IMM GRANULOCYTES # BLD AUTO: 0.01 10*3/MM3 (ref 0–0.05)
IMM GRANULOCYTES NFR BLD AUTO: 0.3 % (ref 0–0.5)
LDLC SERPL CALC-MCNC: 106 MG/DL (ref 0–100)
LYMPHOCYTES # BLD AUTO: 1.15 10*3/MM3 (ref 0.7–3.1)
LYMPHOCYTES NFR BLD AUTO: 36.4 % (ref 19.6–45.3)
MCH RBC QN AUTO: 28.8 PG (ref 26.6–33)
MCHC RBC AUTO-ENTMCNC: 33.4 G/DL (ref 31.5–35.7)
MCV RBC AUTO: 86.2 FL (ref 79–97)
MONOCYTES # BLD AUTO: 0.25 10*3/MM3 (ref 0.1–0.9)
MONOCYTES NFR BLD AUTO: 7.9 % (ref 5–12)
NEUTROPHILS # BLD AUTO: 1.63 10*3/MM3 (ref 1.7–7)
NEUTROPHILS NFR BLD AUTO: 51.7 % (ref 42.7–76)
NRBC BLD AUTO-RTO: 0.3 /100 WBC (ref 0–0.2)
PLATELET # BLD AUTO: 284 10*3/MM3 (ref 140–450)
POTASSIUM SERPL-SCNC: 4 MMOL/L (ref 3.5–5.2)
PROT SERPL-MCNC: 6.7 G/DL (ref 6–8.5)
RBC # BLD AUTO: 4.13 10*6/MM3 (ref 3.77–5.28)
SODIUM SERPL-SCNC: 142 MMOL/L (ref 136–145)
TRIGL SERPL-MCNC: 230 MG/DL (ref 0–150)
TSH SERPL DL<=0.005 MIU/L-ACNC: 3.06 UIU/ML (ref 0.27–4.2)
VIT B12 SERPL-MCNC: 542 PG/ML (ref 211–946)
VLDLC SERPL CALC-MCNC: 46 MG/DL (ref 5–40)
WBC # BLD AUTO: 3.16 10*3/MM3 (ref 3.4–10.8)

## 2020-03-02 ENCOUNTER — TRANSCRIBE ORDERS (OUTPATIENT)
Dept: ADMINISTRATIVE | Facility: HOSPITAL | Age: 55
End: 2020-03-02

## 2020-03-02 DIAGNOSIS — J18.9 PNEUMONIA DUE TO INFECTIOUS ORGANISM, UNSPECIFIED LATERALITY, UNSPECIFIED PART OF LUNG: ICD-10-CM

## 2020-03-02 DIAGNOSIS — R06.00 DYSPNEA, UNSPECIFIED TYPE: Primary | ICD-10-CM

## 2020-03-05 ENCOUNTER — APPOINTMENT (OUTPATIENT)
Dept: CT IMAGING | Facility: HOSPITAL | Age: 55
End: 2020-03-05

## 2020-05-18 ENCOUNTER — PATIENT MESSAGE (OUTPATIENT)
Dept: INTERNAL MEDICINE | Facility: CLINIC | Age: 55
End: 2020-05-18

## 2020-05-18 NOTE — TELEPHONE ENCOUNTER
From: Machelle Ortega  To: Iqra Powell MD  Sent: 5/18/2020 11:57 AM EDT  Subject: Non-Urgent Medical Question    I wanted to see if I could be to detect if I have had COVID 19? I feel very certain I have had most of the symptoms in January. If it is positive then I can donate plasma to help others. Thank you.

## 2020-05-21 ENCOUNTER — OFFICE VISIT (OUTPATIENT)
Dept: INTERNAL MEDICINE | Facility: CLINIC | Age: 55
End: 2020-05-21

## 2020-05-21 VITALS
HEIGHT: 65 IN | SYSTOLIC BLOOD PRESSURE: 110 MMHG | BODY MASS INDEX: 32.15 KG/M2 | WEIGHT: 193 LBS | DIASTOLIC BLOOD PRESSURE: 68 MMHG

## 2020-05-21 DIAGNOSIS — N39.46 MIXED STRESS AND URGE URINARY INCONTINENCE: ICD-10-CM

## 2020-05-21 DIAGNOSIS — Z78.0 MENOPAUSE: ICD-10-CM

## 2020-05-21 DIAGNOSIS — R26.89 BALANCE DISORDER: ICD-10-CM

## 2020-05-21 DIAGNOSIS — F41.9 ANXIETY: ICD-10-CM

## 2020-05-21 DIAGNOSIS — R29.898 WEAKNESS OF BOTH LEGS: ICD-10-CM

## 2020-05-21 DIAGNOSIS — Z00.00 MEDICARE ANNUAL WELLNESS VISIT, SUBSEQUENT: Primary | ICD-10-CM

## 2020-05-21 DIAGNOSIS — M54.50 LOW BACK PAIN, UNSPECIFIED BACK PAIN LATERALITY, UNSPECIFIED CHRONICITY, UNSPECIFIED WHETHER SCIATICA PRESENT: ICD-10-CM

## 2020-05-21 DIAGNOSIS — Z98.890 HISTORY OF SPINAL SURGERY: ICD-10-CM

## 2020-05-21 DIAGNOSIS — R53.82 CHRONIC FATIGUE: ICD-10-CM

## 2020-05-21 DIAGNOSIS — M79.7 FIBROMYALGIA: ICD-10-CM

## 2020-05-21 PROCEDURE — G0439 PPPS, SUBSEQ VISIT: HCPCS | Performed by: INTERNAL MEDICINE

## 2020-05-21 PROCEDURE — 99214 OFFICE O/P EST MOD 30 MIN: CPT | Performed by: INTERNAL MEDICINE

## 2020-05-21 RX ORDER — LORAZEPAM 0.5 MG/1
0.5 TABLET ORAL EVERY 8 HOURS PRN
Qty: 30 TABLET | Refills: 0 | Status: SHIPPED | OUTPATIENT
Start: 2020-05-21 | End: 2020-10-12

## 2020-05-21 RX ORDER — SOLIFENACIN SUCCINATE 10 MG/1
10 TABLET, FILM COATED ORAL DAILY
Qty: 90 TABLET | Refills: 3 | Status: SHIPPED | OUTPATIENT
Start: 2020-05-21 | End: 2021-06-08

## 2020-05-21 NOTE — PROGRESS NOTES
The ABCs of the Annual Wellness Visit  Subsequent Medicare Wellness Visit    Chief Complaint   Patient presents with   • Medicare Wellness-subsequent   • Extremity Weakness   • Balance Issues   • Fatigue   • Anxiety       Subjective   History of Present Illness:  Machelle Ortega is a 54 y.o. female who presents for a Subsequent Medicare Wellness Visit.    HEALTH RISK ASSESSMENT    Recent Hospitalizations:  Recently treated at the following:  [facility]    Current Medical Providers:  Patient Care Team:  Iqra Powell MD as PCP - General (Internal Medicine)  Twin Ni MD as PCP - Claims Attributed  Antionette Herndon MD as Consulting Physician (Obstetrics and Gynecology)  Andrew Hopkins III, MD as Surgeon (Thoracic Surgery)  Dayron Jarrett MD as Surgeon (Neurosurgery)  Vitaliy Schafer MD as Consulting Physician (Pulmonary Disease)  Mohit Natarajan MD as Surgeon (General Surgery)  Vicente Cheng MD as Consulting Physician (Pulmonary Disease)  Ranjit London MD as Consulting Physician (Pain Medicine)  Fatimah Manley MD as Consulting Physician (Orthopedic Surgery)  Neftali Nayak MD as Consulting Physician (Physical Medicine and Rehabilitation)    Smoking Status:  Social History     Tobacco Use   Smoking Status Never Smoker   Smokeless Tobacco Never Used       Alcohol Consumption:  Social History     Substance and Sexual Activity   Alcohol Use Not Currently    Comment: Socially on holidays       Depression Screen:   PHQ-2/PHQ-9 Depression Screening 5/21/2020   Little interest or pleasure in doing things 0   Feeling down, depressed, or hopeless 0   Trouble falling or staying asleep, or sleeping too much -   Feeling tired or having little energy -   Poor appetite or overeating -   Feeling bad about yourself - or that you are a failure or have let yourself or your family down -   Trouble concentrating on things, such as reading the newspaper or watching  television -   Moving or speaking so slowly that other people could have noticed. Or the opposite - being so fidgety or restless that you have been moving around a lot more than usual -   Thoughts that you would be better off dead, or of hurting yourself in some way -   Total Score 0   If you checked off any problems, how difficult have these problems made it for you to do your work, take care of things at home, or get along with other people? -       Fall Risk Screen:  KALEB Fall Risk Assessment was completed, and patient is at MODERATE risk for falls. Assessment completed on:5/21/2020    Health Habits and Functional and Cognitive Screening:  Functional & Cognitive Status 5/21/2020   Do you have difficulty preparing food and eating? No   Do you have difficulty bathing yourself, getting dressed or grooming yourself? No   Do you have difficulty using the toilet? No   Do you have difficulty moving around from place to place? Yes   Do you have trouble with steps or getting out of a bed or a chair? Yes   Current Diet Well Balanced Diet   Dental Exam Not up to date   Eye Exam Not up to date   Exercise (times per week) 7 times per week   Current Exercise Activities Include Walking   Do you need help using the phone?  No   Are you deaf or do you have serious difficulty hearing?  No   Do you need help with transportation? Yes   Do you need help shopping? No   Do you need help preparing meals?  No   Do you need help with housework?  Yes   Do you need help with laundry? Yes   Do you need help taking your medications? Yes   Do you need help managing money? No   Do you ever drive or ride in a car without wearing a seat belt? No   Have you felt unusual stress, anger or loneliness in the last month? Yes   Who do you live with? Spouse   If you need help, do you have trouble finding someone available to you? No   Do you have difficulty concentrating, remembering or making decisions? Yes         Does the patient have evidence of  cognitive impairment? No    Asprin use counseling:Does not need ASA (and currently is not on it)    Age-appropriate Screening Schedule:  Refer to the list below for future screening recommendations based on patient's age, sex and/or medical conditions. Orders for these recommended tests are listed in the plan section. The patient has been provided with a written plan.    Health Maintenance   Topic Date Due   • PNEUMOCOCCAL VACCINE (19-64 MEDIUM RISK) (1 of 1 - PPSV23) 10/17/1984   • DXA SCAN  10/03/2018   • MAMMOGRAM  12/20/2019   • ZOSTER VACCINE (2 of 2) 05/30/2021 (Originally 7/24/2018)   • INFLUENZA VACCINE  08/01/2020   • LIPID PANEL  02/07/2021   • PAP SMEAR  05/01/2021   • TDAP/TD VACCINES (2 - Td) 02/06/2024   • COLONOSCOPY  03/22/2027          The following portions of the patient's history were reviewed and updated as appropriate: allergies, current medications, past family history, past medical history, past social history, past surgical history and problem list.    Outpatient Medications Prior to Visit   Medication Sig Dispense Refill   • albuterol (ACCUNEB) 1.25 MG/3ML nebulizer solution Take 3 mL by nebulization Every 6 (Six) Hours As Needed for Wheezing. 30 vial 0   • albuterol sulfate  (90 Base) MCG/ACT inhaler Inhale 2 puffs Every 4 (Four) Hours As Needed for Wheezing. 6.7 g 1   • buPROPion XL (WELLBUTRIN XL) 300 MG 24 hr tablet Take 1 tablet by mouth Daily. 90 tablet 1   • cyclobenzaprine (FLEXERIL) 10 MG tablet Take 1 tablet by mouth 2 (Two) Times a Day As Needed for Muscle Spasms. 90 tablet 3   • FLUoxetine (PROzac) 20 MG capsule Take 3 capsules by mouth Every Night. 270 capsule 2   • pantoprazole (PROTONIX) 40 MG EC tablet Take 1 tablet by mouth Every Evening. 90 tablet 2   • traZODone (DESYREL) 100 MG tablet TAKE 2 TO 3 TABLET BY MOUTH AT BEDTIME 270 tablet 2   • gabapentin (NEURONTIN) 300 MG capsule Take 1 capsule by mouth 3 (Three) Times a Day. 270 capsule 1   •  promethazine-dextromethorphan (PROMETHAZINE-DM) 6.25-15 MG/5ML syrup Take 5 mL by mouth 4 (Four) Times a Day As Needed for Cough. 118 mL 0     No facility-administered medications prior to visit.        Patient Active Problem List   Diagnosis   • Abdominal pain   • Ataxic gait   • Cervical pain   • Chest pain, pleuritic   • DDD (degenerative disc disease), cervical   • DDD (degenerative disc disease), lumbar   • Dry cough   • Extremity pain   • Low back pain   • Chronic nausea   • Loss of feeling or sensation   • Pain in thoracic spine   • Pleural cavity effusion   • Breath shortness   • Cervical spinal stenosis   • IBS (irritable bowel syndrome)   • GERD (gastroesophageal reflux disease)   • Multiple joint complaints   • ADD (attention deficit disorder) without hyperactivity   • Fibromyalgia   • Mood disorder (CMS/HCC)   • Fatigue   • Sleep apnea   • Hyperlipidemia   • History of benign schwannoma   • Other cervical disc degeneration, high cervical region   • Chronic pain   • Postlaminectomy syndrome, thoracic   • Diaphragmatic hernia without obstruction and without gangrene   • Lumbar radiculopathy   • History of spinal surgery   • Neuropathic pain   • Balance disorder   • Primary insomnia   • Frequent episodes of pneumonia   • Influenza   • Hypokalemia   • Anxiety   • Frequent episodes of bronchitis   • Anemia   • Weakness of both legs   • Mixed stress and urge urinary incontinence       Advanced Care Planning:  ACP discussion was held with the patient during this visit. Patient has an advance directive (not in EMR), copy requested.    Review of Systems   Constitutional: Positive for fatigue. Negative for chills and fever.   Respiratory: Negative for cough, shortness of breath and wheezing.    Cardiovascular: Negative for chest pain, palpitations and leg swelling.   Endocrine: Positive for heat intolerance (& hot flashes).   Genitourinary: Positive for frequency and urgency. Negative for dysuria and hematuria.  "  Musculoskeletal: Positive for arthralgias (all over), back pain, neck pain and neck stiffness.   Psychiatric/Behavioral: Positive for sleep disturbance (b/c pandemic & mother moved in). Negative for dysphoric mood. The patient is nervous/anxious (worse b/c incr stress).        Compared to one year ago, the patient feels her physical health is the same.  Compared to one year ago, the patient feels her mental health is worse.    Reviewed chart for potential of high risk medication in the elderly: not applicable  Reviewed chart for potential of harmful drug interactions in the elderly:not applicable    Objective         Vitals:    05/21/20 1115   BP: 110/68   BP Location: Right arm   Patient Position: Sitting   Cuff Size: Adult   Weight: 87.5 kg (193 lb)   Height: 163.8 cm (64.5\")       Body mass index is 32.62 kg/m².  Discussed the patient's BMI with her. The BMI is in the acceptable range.    Physical Exam   Constitutional: She appears well-developed and well-nourished. No distress.   Cardiovascular: Normal rate, regular rhythm and normal heart sounds.   Pulmonary/Chest: No respiratory distress. She has no wheezes. She has no rales. She exhibits no tenderness.   Musculoskeletal: She exhibits no edema.   Psychiatric: She has a normal mood and affect. Her behavior is normal.   Nursing note and vitals reviewed.    She is unable to stand on 1 foot & has wide based gait.        Assessment/Plan   Medicare Risks and Personalized Health Plan  CMS Preventative Services Quick Reference  Abdominal Aortic Aneurysm Screening  Advance Directive Discussion  Breast Cancer/Mammogram Screening  Chronic Pain   Fall Risk  Immunizations Discussed/Encouraged (specific immunizations; Pneumococcal 23 and Shingrix )  Obesity/Overweight   Urinary Incontinence    The above risks/problems have been discussed with the patient.  Pertinent information has been shared with the patient in the After Visit Summary.  Follow up plans and orders are " seen below in the Assessment/Plan Section.    Diagnoses and all orders for this visit:    1. Medicare annual wellness visit, subsequent (Primary)    2. Chronic fatigue  Comments:  worse b/c stress    3. Low back pain, unspecified back pain laterality, unspecified chronicity, unspecified whether sciatica present  Comments:  spinal stimulator not helping    4. Fibromyalgia  Comments:  for many yrs - worsening fatigue b/c incr stress    5. History of spinal surgery  Comments:  f/u with neurosurgeon    6. Anxiety  Comments:  worse b/c pandemic - ok to use occ ativan (only at night-no driving) - not daily b/c it can cause addiction & depression  Orders:  -     LORazepam (Ativan) 0.5 MG tablet; Take 1 tablet by mouth Every 8 (Eight) Hours As Needed for Anxiety.  Dispense: 30 tablet; Refill: 0    7. Menopause  -     DEXA Bone Density Axial; Future    8. Weakness of both legs  Comments:  b/c previous surgery nerve damage - refer to PT  Orders:  -     Ambulatory Referral to Physical Therapy Evaluate and treat, Ortho    9. Balance disorder  Comments:  worse - refer to PT  Orders:  -     Ambulatory Referral to Physical Therapy Evaluate and treat, Ortho    10. Mixed stress and urge urinary incontinence  Comments:  worse (had surgery yrs ago) - restart vesicare & call if sx persist  Orders:  -     solifenacin (VESIcare) 10 MG tablet; Take 1 tablet by mouth Daily.  Dispense: 90 tablet; Refill: 3      Follow Up:  Return in about 4 months (around 9/21/2020) for Recheck.     An After Visit Summary and PPPS were given to the patient.      Wellness today.   Need daily strengthening & balance exercises (shown today).   Need screening for AAA & carotid disease.  Information given today.     I need all records from GYN in 1 mo.

## 2020-05-21 NOTE — PATIENT INSTRUCTIONS
Medicare Wellness  Personal Prevention Plan of Service     Date of Office Visit:  2020  Encounter Provider:  Iqra Powell MD  Place of Service:  [facility] INTERNAL MEDICINE  Patient Name: Machelle Ortega  :  1965    As part of the Medicare Wellness portion of your visit today, we are providing you with this personalized preventive plan of services (PPPS). This plan is based upon recommendations of the [place] Preventive Services Task Force (USPSTF) and the Advisory Committee on Immunization Practices (ACIP).    This lists the preventive care services that should be considered, and provides dates of when you are due. Items listed as completed are up-to-date and do not require any further intervention.    Health Maintenance   Topic Date Due   • PNEUMOCOCCAL VACCINE (19-64 MEDIUM RISK) (1 of 1 - PPSV23) 10/17/1984   • DXA SCAN  10/03/2018   • MAMMOGRAM  2019   • ZOSTER VACCINE (2 of 2) 2021 (Originally 2018)   • INFLUENZA VACCINE  2020   • LIPID PANEL  2021   • PAP SMEAR  2021   • MEDICARE ANNUAL WELLNESS  2021   • TDAP/TD VACCINES (2 - Td) 2024   • COLONOSCOPY  2027   • HEPATITIS C SCREENING  Completed       Orders Placed This Encounter   Procedures   • DEXA Bone Density Axial     Standing Status:   Future     Order Specific Question:   Reason for Exam:     Answer:   menopause       No follow-ups on file.

## 2020-05-21 NOTE — PROGRESS NOTES
"Subjective   Machelle Ortega is a 54 y.o. female here for   Chief Complaint   Patient presents with   • Medicare Wellness-subsequent   .    Vitals:    05/21/20 1115   BP: 110/68   BP Location: Right arm   Patient Position: Sitting   Cuff Size: Adult   Weight: 87.5 kg (193 lb)   Height: 163.8 cm (64.5\")       Body mass index is 32.62 kg/m².    History of Present Illness     The following portions of the patient's history were reviewed and updated as appropriate: allergies, current medications, past social history and problem list.    Review of Systems    Objective   Physical Exam    Assessment/Plan   There are no diagnoses linked to this encounter.       Answers for HPI/ROS submitted by the patient on 5/18/2020   What is the primary reason for your visit?: Physical    "

## 2020-06-05 ENCOUNTER — HOSPITAL ENCOUNTER (OUTPATIENT)
Dept: CT IMAGING | Facility: HOSPITAL | Age: 55
Discharge: HOME | End: 2020-06-05
Admitting: INTERNAL MEDICINE

## 2020-06-05 DIAGNOSIS — J18.9 PNEUMONIA DUE TO INFECTIOUS ORGANISM, UNSPECIFIED LATERALITY, UNSPECIFIED PART OF LUNG: ICD-10-CM

## 2020-06-05 DIAGNOSIS — R06.00 DYSPNEA, UNSPECIFIED TYPE: ICD-10-CM

## 2020-06-05 PROCEDURE — 71250 CT THORAX DX C-: CPT

## 2020-06-08 DIAGNOSIS — E78.5 HYPERLIPIDEMIA, UNSPECIFIED HYPERLIPIDEMIA TYPE: ICD-10-CM

## 2020-06-08 DIAGNOSIS — R53.82 CHRONIC FATIGUE: Primary | ICD-10-CM

## 2020-06-08 DIAGNOSIS — G89.29 OTHER CHRONIC PAIN: ICD-10-CM

## 2020-06-24 ENCOUNTER — APPOINTMENT (OUTPATIENT)
Dept: WOMENS IMAGING | Facility: HOSPITAL | Age: 55
End: 2020-06-24

## 2020-06-24 PROCEDURE — G0279 TOMOSYNTHESIS, MAMMO: HCPCS | Performed by: RADIOLOGY

## 2020-06-24 PROCEDURE — 76641 ULTRASOUND BREAST COMPLETE: CPT | Performed by: RADIOLOGY

## 2020-06-24 PROCEDURE — 77066 DX MAMMO INCL CAD BI: CPT | Performed by: RADIOLOGY

## 2020-06-29 ENCOUNTER — TELEPHONE (OUTPATIENT)
Dept: NEUROSURGERY | Facility: CLINIC | Age: 55
End: 2020-06-29

## 2020-06-29 NOTE — TELEPHONE ENCOUNTER
----- Message from Machelle Ortega sent at 6/29/2020 10:33 AM EDT -----  Regarding: Complaint  Contact: [phone]  I am a patient of Dr. Buckley. In the past, I have a history of schwannoma's. The first one was removed at 6 weeks old and was located in my right shoulder blade area. The second one was removed by Dr. Phelan in 2005. It was located at T10, T11 and was located in my spinal column.    Last week I noticed a lump under my skin on my right shoulder blade. I happen to be at [facility] for a breast ultrasound and asked  to please scan and she what she could. At first she stated it was probably a lipoma. I explained my history of schwannoma's and instructed me to Dr. PEPE ROMERO.  Although this does not hurt to touch, I have been experiencing nerve pain in this area for several weeks. I have a burning, stabbing sensation.    So, I need to make an appointment ASAP. Could you please let me know what is available? I am concerned because it just popped up in a day and also because of the nerve pain. I recently had a CT scan done at [facility] if someone wanta to view it for possible information.    I appreciate your time and efforts to get me in to see Dr. CANTU.    Thank you,  Machelle Ortega

## 2020-06-30 ENCOUNTER — RESULTS ENCOUNTER (OUTPATIENT)
Dept: INTERNAL MEDICINE | Facility: CLINIC | Age: 55
End: 2020-06-30

## 2020-06-30 DIAGNOSIS — R53.82 CHRONIC FATIGUE: ICD-10-CM

## 2020-06-30 DIAGNOSIS — E78.5 HYPERLIPIDEMIA, UNSPECIFIED HYPERLIPIDEMIA TYPE: ICD-10-CM

## 2020-06-30 LAB
BASOPHILS # BLD AUTO: 0.03 10*3/MM3 (ref 0–0.2)
BASOPHILS NFR BLD AUTO: 0.7 % (ref 0–1.5)
CHOLEST SERPL-MCNC: 237 MG/DL (ref 0–200)
EOSINOPHIL # BLD AUTO: 0.09 10*3/MM3 (ref 0–0.4)
EOSINOPHIL NFR BLD AUTO: 2.2 % (ref 0.3–6.2)
ERYTHROCYTE [DISTWIDTH] IN BLOOD BY AUTOMATED COUNT: 12.4 % (ref 12.3–15.4)
HCT VFR BLD AUTO: 39.7 % (ref 34–46.6)
HDLC SERPL-MCNC: 57 MG/DL (ref 40–60)
HGB BLD-MCNC: 13.2 G/DL (ref 12–15.9)
IMM GRANULOCYTES # BLD AUTO: 0.01 10*3/MM3 (ref 0–0.05)
IMM GRANULOCYTES NFR BLD AUTO: 0.2 % (ref 0–0.5)
LDLC SERPL CALC-MCNC: 147 MG/DL (ref 0–100)
LYMPHOCYTES # BLD AUTO: 1.11 10*3/MM3 (ref 0.7–3.1)
LYMPHOCYTES NFR BLD AUTO: 26.8 % (ref 19.6–45.3)
MCH RBC QN AUTO: 28.6 PG (ref 26.6–33)
MCHC RBC AUTO-ENTMCNC: 33.2 G/DL (ref 31.5–35.7)
MCV RBC AUTO: 85.9 FL (ref 79–97)
MONOCYTES # BLD AUTO: 0.35 10*3/MM3 (ref 0.1–0.9)
MONOCYTES NFR BLD AUTO: 8.5 % (ref 5–12)
NEUTROPHILS # BLD AUTO: 2.55 10*3/MM3 (ref 1.7–7)
NEUTROPHILS NFR BLD AUTO: 61.6 % (ref 42.7–76)
NRBC BLD AUTO-RTO: 0 /100 WBC (ref 0–0.2)
PLATELET # BLD AUTO: 287 10*3/MM3 (ref 140–450)
RBC # BLD AUTO: 4.62 10*6/MM3 (ref 3.77–5.28)
TRIGL SERPL-MCNC: 163 MG/DL (ref 0–150)
TSH SERPL DL<=0.005 MIU/L-ACNC: 3.09 UIU/ML (ref 0.27–4.2)
VLDLC SERPL CALC-MCNC: 32.6 MG/DL
WBC # BLD AUTO: 4.14 10*3/MM3 (ref 3.4–10.8)

## 2020-07-10 ENCOUNTER — OFFICE VISIT (OUTPATIENT)
Dept: NEUROSURGERY | Facility: CLINIC | Age: 55
End: 2020-07-10

## 2020-07-10 VITALS
TEMPERATURE: 98.1 F | DIASTOLIC BLOOD PRESSURE: 79 MMHG | HEIGHT: 64 IN | BODY MASS INDEX: 32.63 KG/M2 | SYSTOLIC BLOOD PRESSURE: 119 MMHG | HEART RATE: 74 BPM

## 2020-07-10 DIAGNOSIS — M51.36 DDD (DEGENERATIVE DISC DISEASE), LUMBAR: ICD-10-CM

## 2020-07-10 DIAGNOSIS — Z86.018 HISTORY OF BENIGN SPINAL CORD TUMOR: ICD-10-CM

## 2020-07-10 DIAGNOSIS — M54.6 PAIN IN THORACIC SPINE: ICD-10-CM

## 2020-07-10 DIAGNOSIS — M54.12 CERVICAL RADICULOPATHY: Primary | ICD-10-CM

## 2020-07-10 DIAGNOSIS — M48.02 CERVICAL SPINAL STENOSIS: Primary | ICD-10-CM

## 2020-07-10 DIAGNOSIS — Z86.018 HISTORY OF BENIGN SCHWANNOMA: ICD-10-CM

## 2020-07-10 PROCEDURE — 99214 OFFICE O/P EST MOD 30 MIN: CPT | Performed by: NEUROLOGICAL SURGERY

## 2020-07-27 ENCOUNTER — HOSPITAL ENCOUNTER (OUTPATIENT)
Dept: CT IMAGING | Facility: HOSPITAL | Age: 55
Discharge: HOME | End: 2020-07-27
Admitting: NEUROLOGICAL SURGERY

## 2020-07-27 ENCOUNTER — HOSPITAL ENCOUNTER (OUTPATIENT)
Dept: GENERAL RADIOLOGY | Facility: HOSPITAL | Age: 55
Discharge: HOME | End: 2020-07-27

## 2020-07-27 VITALS
HEIGHT: 64 IN | TEMPERATURE: 97.3 F | BODY MASS INDEX: 33.29 KG/M2 | RESPIRATION RATE: 16 BRPM | HEART RATE: 66 BPM | DIASTOLIC BLOOD PRESSURE: 60 MMHG | SYSTOLIC BLOOD PRESSURE: 104 MMHG | OXYGEN SATURATION: 100 % | WEIGHT: 195 LBS

## 2020-07-27 DIAGNOSIS — Z86.018 HISTORY OF BENIGN SPINAL CORD TUMOR: ICD-10-CM

## 2020-07-27 DIAGNOSIS — Z86.018 HISTORY OF BENIGN SCHWANNOMA: ICD-10-CM

## 2020-07-27 DIAGNOSIS — M54.12 CERVICAL RADICULOPATHY: ICD-10-CM

## 2020-07-27 DIAGNOSIS — M51.36 DDD (DEGENERATIVE DISC DISEASE), LUMBAR: ICD-10-CM

## 2020-07-27 DIAGNOSIS — M48.02 CERVICAL SPINAL STENOSIS: ICD-10-CM

## 2020-07-27 DIAGNOSIS — M54.6 PAIN IN THORACIC SPINE: ICD-10-CM

## 2020-07-27 PROCEDURE — 62305 MYELOGRAPHY LUMBAR INJECTION: CPT

## 2020-07-27 PROCEDURE — 62284 INJECTION FOR MYELOGRAM: CPT

## 2020-07-27 PROCEDURE — 72040 X-RAY EXAM NECK SPINE 2-3 VW: CPT

## 2020-07-27 PROCEDURE — 25010000003 LIDOCAINE 1 % SOLUTION: Performed by: RADIOLOGY

## 2020-07-27 PROCEDURE — 72240 MYELOGRAPHY NECK SPINE: CPT

## 2020-07-27 PROCEDURE — 72114 X-RAY EXAM L-S SPINE BENDING: CPT

## 2020-07-27 PROCEDURE — 72129 CT CHEST SPINE W/DYE: CPT

## 2020-07-27 PROCEDURE — 72126 CT NECK SPINE W/DYE: CPT

## 2020-07-27 PROCEDURE — 72132 CT LUMBAR SPINE W/DYE: CPT

## 2020-07-27 PROCEDURE — 25010000002 IOPAMIDOL 61 % SOLUTION: Performed by: RADIOLOGY

## 2020-07-27 RX ORDER — LIDOCAINE HYDROCHLORIDE 10 MG/ML
10 INJECTION, SOLUTION INFILTRATION; PERINEURAL ONCE
Status: COMPLETED | OUTPATIENT
Start: 2020-07-27 | End: 2020-07-27

## 2020-07-27 RX ORDER — ALPRAZOLAM 0.5 MG/1
0.5 TABLET ORAL ONCE
Status: COMPLETED | OUTPATIENT
Start: 2020-07-27 | End: 2020-07-27

## 2020-07-27 RX ADMIN — LIDOCAINE HYDROCHLORIDE 9 ML: 10 INJECTION, SOLUTION INFILTRATION; PERINEURAL at 07:52

## 2020-07-27 RX ADMIN — ALPRAZOLAM 0.5 MG: 0.5 TABLET ORAL at 07:12

## 2020-07-27 RX ADMIN — IOPAMIDOL 15 ML: 612 INJECTION, SOLUTION INTRATHECAL at 08:01

## 2020-07-27 NOTE — DISCHARGE INSTRUCTIONS
EDUCATION /DISCHARGE INSTRUCTIONS:  A myelogram is a special radiology procedure of the spinal cord, spinal nerves and other related structures.  You will be awake during the examination.  An area of your lower back will be cleansed with an antiseptic solution.  The physician will inject a numbing medication in your lower back.  While your back is numb, a needle will be placed in the lower back area.  A small amount of spinal fluid may be withdrawn and sent to the lab if ordered by your physician. While the needle is in the back, an injection of a contrast material (xray dye) will be given through the needle.  The contrast material will allow the physician to see the spinal cord and spinal nerves.  Once injected, the needle will be removed and a band aid will be placed over the injection site.  The table will be tilted during the process to allow the contrast material to flow to particular areas in the spine.  Following the injection and xrays, you will be taken to the CT scan where more pictures will be taken. After the procedure is finished, the contrast material will be absorbed by your body and eliminated through your kidneys.  The radiologist will study and interpret your myelogram and send the results to your physician.  Procedure risks of a myelogram include, but are not limited to:  *  Bleeding   *  seizure  *  Infection   *  Headache, possibly severe requiring  *  Contrast reaction      a blood patch  *  Nerve or cord injury  *  Paralysis and death  Benefits of the procedure:  *  Best examination for delineating pathology related to spinal cord compression from a disc and/or nerve root compression  Alternatives to the procedure:  MRI - a non invasive procedure requiring intravenous contrast injection.  Cannot be done on patients with certain pacemakers or metal in the body.  MRI risks include possible reaction to the contrast material, movement of metal located in the body.   Benefit to MRI:  Non-invasive  and usually painless procedure.  THIS EDUCATION INFORMATION WAS REVIEWED PRIOR TO THE PROCEDURE AND CONSENT. Patient initials __________________Time_______0708__________  Important information following your myelogram:  *  Lie down with your head elevated no more than 2 pillows for the next 24 hours.   *  Sit up in the car going home.  Sit up to eat and use the restroom only,  for 24 hours.  *  24 HOURS COMPLETE AT ____7/28/2020  @ 0900____________________   *  Tomorrow, after 24 hours complete, take it easy and rest.  *  Do not drive for 48 hours following a myelogram  *  You may remove the bandage and shower in the morning  *  Increase your fluids for the next 24 hours.  Caffeinated drinks are encouraged.   Resume taking your blood thinner or Aspirin on ___7/28/2020 after 0830______________________     :  _________________    Resume taking antipsychotics/antidepressant on _________________________  CALL YOUR PHYSICIAN FOR THE FOLLOWING:  * Pain at the injection site  * Reddness, swelling, bruising or drainage at the injection site  * A fever by mouth of 101.0  * Any new symptoms  If you have problems with a headache that is not relieved with rest and medication, please call the Radiology Triage Nurse desk  [phone]

## 2020-07-28 ENCOUNTER — TELEPHONE (OUTPATIENT)
Dept: INTERVENTIONAL RADIOLOGY/VASCULAR | Facility: HOSPITAL | Age: 55
End: 2020-07-28

## 2020-08-04 NOTE — PROGRESS NOTES
Subjective   History of Present Illness: Machelle Ortega is a 54 y.o. female is here today for follow-up to discuss whole body myelogram and plain film results. She had C3-C6 ACCF 2.13.14 and T10-T11 laminectomy for resection of a schwannoma in 2006 by Dr Phelan.  She has a SCS that was placed in 2017    Patient was last seen 7.10.20 for left shoulder blade pain and upper back pain, left arm and pain whole left side of her body, left arm weakness and neck pain.  Patient states that her symptoms are unchanged, she denies urinary incontinence or problems with her balance and gait.    This very nice lady tolerated the myelogram well. She had surgery by Dr. Phelan for the thoracic schwannoma bout 15 years ago. I do not have any records of the operative report or even pre and postsurgical MRI's. She has those at home. She will bring them. I told her that there is a small amount of tumor at T10-T11 which could either be residual or recurrent and I have no way of knowing if it has grown or not. I told her the amount if actually small and I do not think we need to address it surgically at all for the time being. She will bring some records including pre and postoperative MRI's so I can look at them. I reassured her that everything looked reasonably fine with the neck. She has a broken screw at C6 but that is clinically insignificant. The left scapular pain I think is referred pain from the adjacent level disease at C6-C7 but there is no significant herniated disk. She has some mild lumbar stenosis and that causes some intermittent pain down the legs. She is spastic from her previous spinal cord surgery. I do not think there is anything from the surgical standpoint to do. I suggested that we try some therapy in both the neck and low back and even try some dry needling. She has agreed to that and will come back in 3 months.       Back Pain   The problem occurs constantly. The problem is unchanged. The pain is present in the  "thoracic spine. The pain does not radiate. The pain is at a severity of 5/10. The pain is moderate. The symptoms are aggravated by standing, sitting, twisting, position, lying down and bending. Associated symptoms include weakness. Pertinent negatives include no fever or numbness.   Arm Pain    The pain is present in the left forearm, upper left arm and left shoulder. The pain is at a severity of 5/10. The pain is moderate. The pain has been constant since the incident. Pertinent negatives include no numbness.   Extremity Weakness    The pain is present in the left arm. The problem occurs constantly. The pain is at a severity of 5/10. The pain is moderate. Pertinent negatives include no fever or numbness.   Neck Pain    The problem occurs constantly. The problem has been unchanged. The pain is at a severity of 4/10. Associated symptoms include weakness. Pertinent negatives include no fever or numbness.       The following portions of the patient's history were reviewed and updated as appropriate: allergies, current medications, past family history, past medical history, past social history, past surgical history and problem list.    Review of Systems   Constitutional: Negative for fever.   HENT: Negative.    Eyes: Negative.    Respiratory: Negative.    Cardiovascular: Negative.    Gastrointestinal: Negative.    Endocrine: Negative.    Genitourinary: Negative for urgency.   Musculoskeletal: Positive for back pain, extremity weakness, neck pain and neck stiffness. Negative for arthralgias, gait problem, joint swelling and myalgias.   Allergic/Immunologic: Negative.    Neurological: Positive for weakness. Negative for numbness.   Hematological: Negative.    Psychiatric/Behavioral: Negative.        Objective     Vitals:    08/10/20 1018   BP: 111/64   Pulse: 74   Temp: 98.9 °F (37.2 °C)   TempSrc: Tympanic   Height: 162.6 cm (64.02\")     Body mass index is 33.45 kg/m².      Physical Exam   Constitutional: She is " oriented to person, place, and time. She appears well-developed and well-nourished.   HENT:   Head: Normocephalic and atraumatic.   Eyes: Pupils are equal, round, and reactive to light. Conjunctivae and EOM are normal.   Fundoscopic exam:       The right eye shows no papilledema. The right eye shows venous pulsations.        The left eye shows no papilledema. The left eye shows venous pulsations.   Neck: Carotid bruit is not present.   Neurological: She is oriented to person, place, and time. She has a normal Finger-Nose-Finger Test and a normal Heel to Shin Test. Gait normal.   Reflex Scores:       Tricep reflexes are 3+ on the right side and 3+ on the left side.       Bicep reflexes are 3+ on the right side and 3+ on the left side.       Brachioradialis reflexes are 3+ on the right side and 3+ on the left side.       Patellar reflexes are 3+ on the right side and 3+ on the left side.       Achilles reflexes are 3+ on the right side and 3+ on the left side.  Psychiatric: Her speech is normal.     Neurologic Exam     Mental Status   Oriented to person, place, and time.   Registration of memory: Good recent and remote memory.   Attention: normal. Concentration: normal.   Speech: speech is normal   Level of consciousness: alert  Knowledge: consistent with education.     Cranial Nerves     CN II   Visual fields full to confrontation.   Visual acuity: normal    CN III, IV, VI   Pupils are equal, round, and reactive to light.  Extraocular motions are normal.     CN V   Facial sensation intact.   Right corneal reflex: normal  Left corneal reflex: normal    CN VII   Facial expression full, symmetric.   Right facial weakness: none  Left facial weakness: none    CN VIII   Hearing: intact    CN IX, X   Palate: symmetric    CN XI   Right sternocleidomastoid strength: normal  Left sternocleidomastoid strength: normal    CN XII   Tongue: not atrophic  Tongue deviation: none    Motor Exam   Muscle bulk: normal  Right arm tone:  normal  Left arm tone: normal  Right leg tone: normal  Left leg tone: normal    Strength   Strength 5/5 except as noted.     Sensory Exam   Light touch normal.     Gait, Coordination, and Reflexes     Gait  Gait: normal    Coordination   Finger to nose coordination: normal  Heel to shin coordination: normal    Reflexes   Right brachioradialis: 3+  Left brachioradialis: 3+  Right biceps: 3+  Left biceps: 3+  Right triceps: 3+  Left triceps: 3+  Right patellar: 3+  Left patellar: 3+  Right achilles: 3+  Left achilles: 3+  Right : 3+  Left : 3+Spastic gait           Assessment/Plan   Independent Review of Radiographic Studies:      I personally reviewed the images from the following studies.    I reviewed the myelogram which showed the fusion from C3-C6.  There is a broken screw at C6 but there is no displacement.  There is some mild facet disease below the fusion C6-C7.  There is some mild spinal stenosis at L4-L5.  There is some evidence of a small amount of probable schwannoma at the T10-11 level where the surgery took place.  Is hard to know if this is recurrent or residual.  Agree with the report.        Medical Decision Making:      Again, nothing that is worrisome suggesting the need for another surgery.  We will send her to physical therapy for both the cervical and the lumbar spine and have her follow-up with 3 months.  I think a dry needling should help the left scapular pain.      Machelle was seen today for back pain, arm pain, extremity weakness and neck pain.    Diagnoses and all orders for this visit:    Intradural extramedullary spinal tumor    Cervical spinal stenosis  -     Ambulatory Referral to Physical Therapy Evaluate and treat    Spinal stenosis of lumbar region with neurogenic claudication  -     Ambulatory Referral to Physical Therapy Evaluate and treat      Return in about 3 months (around 11/10/2020).

## 2020-08-10 ENCOUNTER — OFFICE VISIT (OUTPATIENT)
Dept: NEUROSURGERY | Facility: CLINIC | Age: 55
End: 2020-08-10

## 2020-08-10 VITALS
TEMPERATURE: 98.9 F | BODY MASS INDEX: 33.45 KG/M2 | DIASTOLIC BLOOD PRESSURE: 64 MMHG | SYSTOLIC BLOOD PRESSURE: 111 MMHG | HEART RATE: 74 BPM | HEIGHT: 64 IN

## 2020-08-10 DIAGNOSIS — M48.062 SPINAL STENOSIS OF LUMBAR REGION WITH NEUROGENIC CLAUDICATION: ICD-10-CM

## 2020-08-10 DIAGNOSIS — D49.7 INTRADURAL EXTRAMEDULLARY SPINAL TUMOR: Primary | ICD-10-CM

## 2020-08-10 DIAGNOSIS — M48.02 CERVICAL SPINAL STENOSIS: ICD-10-CM

## 2020-08-10 PROCEDURE — 99214 OFFICE O/P EST MOD 30 MIN: CPT | Performed by: NEUROLOGICAL SURGERY

## 2020-09-09 ENCOUNTER — TELEPHONE (OUTPATIENT)
Dept: PHYSICAL THERAPY | Facility: CLINIC | Age: 55
End: 2020-09-09

## 2020-09-21 ENCOUNTER — OFFICE VISIT (OUTPATIENT)
Dept: INTERNAL MEDICINE | Facility: CLINIC | Age: 55
End: 2020-09-21

## 2020-09-21 VITALS
TEMPERATURE: 98.4 F | SYSTOLIC BLOOD PRESSURE: 116 MMHG | DIASTOLIC BLOOD PRESSURE: 74 MMHG | HEIGHT: 64 IN | HEART RATE: 69 BPM | WEIGHT: 194.6 LBS | OXYGEN SATURATION: 97 % | BODY MASS INDEX: 33.22 KG/M2

## 2020-09-21 DIAGNOSIS — G47.33 OBSTRUCTIVE SLEEP APNEA SYNDROME: ICD-10-CM

## 2020-09-21 DIAGNOSIS — F51.01 PRIMARY INSOMNIA: ICD-10-CM

## 2020-09-21 DIAGNOSIS — F33.9 MONOPOLAR DEPRESSION (HCC): ICD-10-CM

## 2020-09-21 DIAGNOSIS — Z23 NEED FOR IMMUNIZATION AGAINST INFLUENZA: ICD-10-CM

## 2020-09-21 DIAGNOSIS — E78.5 HYPERLIPIDEMIA, UNSPECIFIED HYPERLIPIDEMIA TYPE: ICD-10-CM

## 2020-09-21 DIAGNOSIS — J45.20 MILD INTERMITTENT ASTHMA, UNSPECIFIED WHETHER COMPLICATED: ICD-10-CM

## 2020-09-21 DIAGNOSIS — B02.9 HERPES ZOSTER WITHOUT COMPLICATION: Primary | ICD-10-CM

## 2020-09-21 PROCEDURE — 99214 OFFICE O/P EST MOD 30 MIN: CPT | Performed by: INTERNAL MEDICINE

## 2020-09-21 PROCEDURE — 90694 VACC AIIV4 NO PRSRV 0.5ML IM: CPT | Performed by: INTERNAL MEDICINE

## 2020-09-21 PROCEDURE — 90471 IMMUNIZATION ADMIN: CPT | Performed by: INTERNAL MEDICINE

## 2020-09-21 RX ORDER — VALACYCLOVIR HYDROCHLORIDE 1 G/1
1000 TABLET, FILM COATED ORAL 3 TIMES DAILY
Qty: 21 TABLET | Refills: 0 | Status: SHIPPED | OUTPATIENT
Start: 2020-09-21 | End: 2020-10-07 | Stop reason: SDUPTHER

## 2020-09-21 RX ORDER — BUPROPION HYDROCHLORIDE 150 MG/1
150 TABLET ORAL EVERY MORNING
COMMUNITY
Start: 2020-09-20 | End: 2021-12-07 | Stop reason: SDUPTHER

## 2020-09-21 RX ORDER — PREDNISONE 20 MG/1
TABLET ORAL
COMMUNITY
Start: 2020-09-16 | End: 2020-09-21

## 2020-09-21 RX ORDER — MELATONIN
1000 DAILY
COMMUNITY

## 2020-09-21 RX ORDER — TRIAMCINOLONE ACETONIDE 1 MG/G
CREAM TOPICAL
COMMUNITY
Start: 2020-09-17 | End: 2020-09-21

## 2020-09-21 NOTE — PROGRESS NOTES
"Subjective   Machelle Ortega is a 54 y.o. female here for   Chief Complaint   Patient presents with   • Hyperlipidemia   • Anxiety   • Rash   .    Vitals:    09/21/20 1016   BP: 116/74   BP Location: Left arm   Patient Position: Sitting   Cuff Size: Adult   Pulse: 69   Temp: 98.4 °F (36.9 °C)   SpO2: 97%   Weight: 88.3 kg (194 lb 9.6 oz)   Height: 162.6 cm (64\")       Body mass index is 33.4 kg/m².    Hyperlipidemia  This is a chronic problem. The current episode started more than 1 year ago. The problem is controlled. Recent lipid tests were reviewed and are normal. She has no history of diabetes. Pertinent negatives include no chest pain or shortness of breath.   Anxiety  Presents for follow-up visit. Symptoms include excessive worry and nervous/anxious behavior. Patient reports no chest pain, palpitations, shortness of breath or suicidal ideas. Symptoms occur constantly. The severity of symptoms is moderate.     Her past medical history is significant for depression.   Depression  Visit Type: follow-up  Patient presents with the following symptoms: excessive worry and nervousness/anxiety.  Patient is not experiencing: feelings of worthlessness, palpitations, shortness of breath, suicidal ideas, suicidal planning and thoughts of death.    Rash  This is a new problem. The current episode started in the past 7 days. The problem is unchanged. The affected locations include the right buttock and right upper leg. The rash is characterized by blistering and burning. Pertinent negatives include no cough, fatigue, fever or shortness of breath. Past treatments include nothing.        The following portions of the patient's history were reviewed and updated as appropriate: allergies, current medications, past social history and problem list.    Review of Systems   Constitutional: Negative for chills, fatigue and fever.   Respiratory: Negative for cough, shortness of breath and wheezing.    Cardiovascular: Negative for chest " "pain, palpitations and leg swelling.   Skin: Positive for rash.   Psychiatric/Behavioral: Positive for dysphoric mood and sleep disturbance. Negative for suicidal ideas. The patient is nervous/anxious.        Objective   Physical Exam  Vitals signs and nursing note reviewed.   Constitutional:       General: She is not in acute distress.     Appearance: She is well-developed.   Cardiovascular:      Rate and Rhythm: Normal rate and regular rhythm.      Heart sounds: Normal heart sounds.   Pulmonary:      Effort: No respiratory distress.      Breath sounds: No wheezing or rales.   Chest:      Chest wall: No tenderness.   Skin:     Findings: Rash present.   Neurological:      General: No focal deficit present.   Psychiatric:         Behavior: Behavior normal.     She has a 12 cm x 12 cm patch of the right buttock and 8 x 10 cm patch of the right anterior thigh- healing vesicles with eschar but no surrounding erythema.    Assessment/Plan   Diagnoses and all orders for this visit:    Herpes zoster without complication  Comments:  on right ant thigh & buttock - need valtrex -avoid exposing anyone who has not had chickenpox or vaccine  Orders:  -     valACYclovir (Valtrex) 1000 MG tablet; Take 1 tablet by mouth 3 (Three) Times a Day.    Monopolar depression (CMS/HCC)  Comments:  Worse because of the situation-need counseling (gave info on psych [facility])-continue Prozac and Wellbutrin for now.  Go to ER if severe symptoms.  Orders:  -     buPROPion XL (WELLBUTRIN XL) 150 MG 24 hr tablet    Primary insomnia  Comments:  discuss with counselors - need to decrease trazodone if possible    Hyperlipidemia, unspecified hyperlipidemia type  Comments:  continue diet/ex    Obstructive sleep apnea syndrome  Comments:  she is not wearing CPAP b/c \"feel suffocated\"-this is dangerous-need to discuss with counselors.    Mild intermittent asthma, unspecified whether complicated  Comments:  controlled - call if worse  Orders:  -     predniSONE " (DELTASONE) 20 MG tablet    Other orders  -     Discontinue: triamcinolone (KENALOG) 0.1 % cream  -     cholecalciferol (VITAMIN D3) 25 MCG (1000 UT) tablet; Take 1,000 Units by mouth Daily.     She states she has diagnosis of ADD and will get old records for her current chart.

## 2020-10-07 DIAGNOSIS — B02.9 HERPES ZOSTER WITHOUT COMPLICATION: ICD-10-CM

## 2020-10-07 RX ORDER — VALACYCLOVIR HYDROCHLORIDE 1 G/1
1000 TABLET, FILM COATED ORAL 3 TIMES DAILY
Qty: 21 TABLET | Refills: 0 | Status: SHIPPED | OUTPATIENT
Start: 2020-10-07 | End: 2021-06-28

## 2020-10-09 DIAGNOSIS — F41.9 ANXIETY: ICD-10-CM

## 2020-10-12 RX ORDER — LORAZEPAM 0.5 MG/1
TABLET ORAL
Qty: 30 TABLET | Refills: 0 | Status: SHIPPED | OUTPATIENT
Start: 2020-10-12

## 2021-01-04 DIAGNOSIS — K21.9 GASTROESOPHAGEAL REFLUX DISEASE: ICD-10-CM

## 2021-01-04 RX ORDER — PANTOPRAZOLE SODIUM 40 MG/1
40 TABLET, DELAYED RELEASE ORAL EVERY EVENING
Qty: 90 TABLET | Refills: 2 | Status: SHIPPED | OUTPATIENT
Start: 2021-01-04 | End: 2021-12-07 | Stop reason: SDUPTHER

## 2021-02-01 ENCOUNTER — OFFICE VISIT (OUTPATIENT)
Dept: INTERNAL MEDICINE | Facility: CLINIC | Age: 56
End: 2021-02-01

## 2021-02-01 DIAGNOSIS — G89.29 CHRONIC PAIN OF BOTH SHOULDERS: Primary | ICD-10-CM

## 2021-02-01 DIAGNOSIS — M25.512 CHRONIC PAIN OF BOTH SHOULDERS: Primary | ICD-10-CM

## 2021-02-01 DIAGNOSIS — F33.9 MONOPOLAR DEPRESSION (HCC): ICD-10-CM

## 2021-02-01 DIAGNOSIS — F98.8 ATTENTION DEFICIT DISORDER (ADD) WITHOUT HYPERACTIVITY: ICD-10-CM

## 2021-02-01 DIAGNOSIS — M25.511 CHRONIC PAIN OF BOTH SHOULDERS: Primary | ICD-10-CM

## 2021-02-01 PROCEDURE — 99443 PR PHYS/QHP TELEPHONE EVALUATION 21-30 MIN: CPT | Performed by: INTERNAL MEDICINE

## 2021-02-01 NOTE — PROGRESS NOTES
Subjective   Machelle Ortega is a 55 y.o. female seen today for Shoulder Pain, Fall (on left knee & left wrist 2 days ago - doing ok), and ADD (I need all records of treatment with adderall 20mg bid).     Fall  The accident occurred 2 days ago. The fall occurred while walking. She fell from a height of 1 to 2 ft. She landed on hard floor. There was no blood loss. The point of impact was the left wrist and left knee. The pain is mild. Pertinent negatives include no fever.   Shoulder Injury   There was no injury mechanism. The quality of the pain is described as burning. The pain does not radiate. The pain is severe. Pertinent negatives include no chest pain. The symptoms are aggravated by overhead lifting.   ADD  This is a chronic problem. The current episode started more than 1 year ago. The problem occurs constantly. The problem has been gradually worsening. Associated symptoms include arthralgias (bilat shoulder pain) and fatigue. Pertinent negatives include no chest pain, chills, coughing or fever.        The following portions of the patient's history were reviewed and updated as appropriate: allergies, current medications, past social history and problem list.    Review of Systems   Constitutional: Positive for fatigue. Negative for chills and fever.   Respiratory: Negative for cough, shortness of breath and wheezing.    Cardiovascular: Negative for chest pain, palpitations and leg swelling.   Musculoskeletal: Positive for arthralgias (bilat shoulder pain).   Psychiatric/Behavioral: Positive for decreased concentration and sleep disturbance. Negative for agitation, dysphoric mood, self-injury and suicidal ideas. The patient is not nervous/anxious and is not hyperactive.        Objective  No PE b/c phone visit today.  LMP  (LMP Unknown)   Physical Exam    Assessment/Plan   Problems Addressed this Visit        Unprioritized    Attention deficit disorder (ADD) without hyperactivity     Psychological condition is  worsening.  Regular aerobic exercise.  Psychological condition  will be reassessed in 3 months.    She took adderall 20mg bid for over 15 yrs - I need all records b/c she wants me to prescribe.         Monopolar depression (CMS/HCC)     Psychological condition is improving with treatment.  Continue current treatment regimen.  Regular aerobic exercise.  Psychological condition  will be reassessed in 3 months.         Chronic pain of both shoulders - Primary    Relevant Orders    Ambulatory Referral to Orthopedic Surgery (Completed)      Diagnoses       Codes Comments    Chronic pain of both shoulders    -  Primary ICD-10-CM: M25.511, G89.29, M25.512  ICD-9-CM: 719.41, 338.29     Attention deficit disorder (ADD) without hyperactivity     ICD-10-CM: F98.8  ICD-9-CM: 314.00     Monopolar depression (CMS/HCC)     ICD-10-CM: F33.9  ICD-9-CM: 296.20          For shoulder pain, she wants to see dr todd for continuity of care.        She took adderall for 15 yrs (given by dr zapata)  - stopped it 2 yrs ago b/c too expensive to see psychiatrist. She wants me to prescribe it - I need all records.     This visit has been rescheduled as a phone visit to comply with patient safety concerns in accordance with CDC recommendations. Total time of discussion was 25 minutes.         You have chosen to receive care through a telephone visit. Do you consent to use a telephone visit for your medical care today? Yes

## 2021-02-01 NOTE — ASSESSMENT & PLAN NOTE
Psychological condition is worsening.  Regular aerobic exercise.  Psychological condition  will be reassessed in 3 months.    She took adderall 20mg bid for over 15 yrs - I need all records b/c she wants me to prescribe.

## 2021-02-24 DIAGNOSIS — F41.9 ANXIETY: ICD-10-CM

## 2021-02-24 RX ORDER — FLUOXETINE HYDROCHLORIDE 20 MG/1
60 CAPSULE ORAL NIGHTLY
Qty: 270 CAPSULE | Refills: 2 | Status: SHIPPED | OUTPATIENT
Start: 2021-02-24 | End: 2021-12-06

## 2021-03-26 ENCOUNTER — BULK ORDERING (OUTPATIENT)
Dept: CASE MANAGEMENT | Facility: OTHER | Age: 56
End: 2021-03-26

## 2021-03-26 DIAGNOSIS — Z23 IMMUNIZATION DUE: ICD-10-CM

## 2021-05-03 ENCOUNTER — APPOINTMENT (OUTPATIENT)
Dept: GENERAL RADIOLOGY | Facility: HOSPITAL | Age: 56
End: 2021-05-03

## 2021-05-03 ENCOUNTER — HOSPITAL ENCOUNTER (EMERGENCY)
Facility: HOSPITAL | Age: 56
Discharge: HOME | End: 2021-05-03
Attending: EMERGENCY MEDICINE | Admitting: EMERGENCY MEDICINE

## 2021-05-03 ENCOUNTER — TELEPHONE (OUTPATIENT)
Dept: INTERNAL MEDICINE | Facility: CLINIC | Age: 56
End: 2021-05-03

## 2021-05-03 VITALS
DIASTOLIC BLOOD PRESSURE: 78 MMHG | OXYGEN SATURATION: 100 % | SYSTOLIC BLOOD PRESSURE: 124 MMHG | TEMPERATURE: 97.8 F | HEIGHT: 64 IN | RESPIRATION RATE: 16 BRPM | BODY MASS INDEX: 34.15 KG/M2 | HEART RATE: 60 BPM | WEIGHT: 200 LBS

## 2021-05-03 DIAGNOSIS — R06.00 DYSPNEA, UNSPECIFIED TYPE: ICD-10-CM

## 2021-05-03 DIAGNOSIS — R05.9 COUGH: Primary | ICD-10-CM

## 2021-05-03 LAB
ANION GAP SERPL CALCULATED.3IONS-SCNC: 7.5 MMOL/L (ref 5–15)
BASOPHILS # BLD AUTO: 0.03 10*3/MM3 (ref 0–0.2)
BASOPHILS NFR BLD AUTO: 0.9 % (ref 0–1.5)
BUN SERPL-MCNC: 16 MG/DL (ref 6–20)
BUN/CREAT SERPL: 22.5 (ref 7–25)
CALCIUM SPEC-SCNC: 9.1 MG/DL (ref 8.6–10.5)
CHLORIDE SERPL-SCNC: 106 MMOL/L (ref 98–107)
CO2 SERPL-SCNC: 25.5 MMOL/L (ref 22–29)
CREAT SERPL-MCNC: 0.71 MG/DL (ref 0.57–1)
DEPRECATED RDW RBC AUTO: 39.6 FL (ref 37–54)
EOSINOPHIL # BLD AUTO: 0.11 10*3/MM3 (ref 0–0.4)
EOSINOPHIL NFR BLD AUTO: 3.2 % (ref 0.3–6.2)
ERYTHROCYTE [DISTWIDTH] IN BLOOD BY AUTOMATED COUNT: 12.7 % (ref 12.3–15.4)
GFR SERPL CREATININE-BSD FRML MDRD: 85 ML/MIN/1.73
GLUCOSE SERPL-MCNC: 93 MG/DL (ref 65–99)
HCT VFR BLD AUTO: 35.9 % (ref 34–46.6)
HGB BLD-MCNC: 11.9 G/DL (ref 12–15.9)
HOLD SPECIMEN: NORMAL
HOLD SPECIMEN: NORMAL
IMM GRANULOCYTES # BLD AUTO: 0 10*3/MM3 (ref 0–0.05)
IMM GRANULOCYTES NFR BLD AUTO: 0 % (ref 0–0.5)
LYMPHOCYTES # BLD AUTO: 1.2 10*3/MM3 (ref 0.7–3.1)
LYMPHOCYTES NFR BLD AUTO: 35 % (ref 19.6–45.3)
MCH RBC QN AUTO: 28.5 PG (ref 26.6–33)
MCHC RBC AUTO-ENTMCNC: 33.1 G/DL (ref 31.5–35.7)
MCV RBC AUTO: 85.9 FL (ref 79–97)
MONOCYTES # BLD AUTO: 0.3 10*3/MM3 (ref 0.1–0.9)
MONOCYTES NFR BLD AUTO: 8.7 % (ref 5–12)
NEUTROPHILS NFR BLD AUTO: 1.79 10*3/MM3 (ref 1.7–7)
NEUTROPHILS NFR BLD AUTO: 52.2 % (ref 42.7–76)
NRBC BLD AUTO-RTO: 0 /100 WBC (ref 0–0.2)
PLATELET # BLD AUTO: 270 10*3/MM3 (ref 140–450)
PMV BLD AUTO: 8.6 FL (ref 6–12)
POTASSIUM SERPL-SCNC: 4.1 MMOL/L (ref 3.5–5.2)
QT INTERVAL: 428 MS
RBC # BLD AUTO: 4.18 10*6/MM3 (ref 3.77–5.28)
SARS-COV-2 ORF1AB RESP QL NAA+PROBE: NOT DETECTED
SODIUM SERPL-SCNC: 139 MMOL/L (ref 136–145)
TROPONIN T SERPL-MCNC: <0.01 NG/ML (ref 0–0.03)
WBC # BLD AUTO: 3.43 10*3/MM3 (ref 3.4–10.8)
WHOLE BLOOD HOLD SPECIMEN: NORMAL
WHOLE BLOOD HOLD SPECIMEN: NORMAL

## 2021-05-03 PROCEDURE — 85025 COMPLETE CBC W/AUTO DIFF WBC: CPT | Performed by: PHYSICIAN ASSISTANT

## 2021-05-03 PROCEDURE — U0005 INFEC AGEN DETEC AMPLI PROBE: HCPCS | Performed by: PHYSICIAN ASSISTANT

## 2021-05-03 PROCEDURE — 80048 BASIC METABOLIC PNL TOTAL CA: CPT | Performed by: PHYSICIAN ASSISTANT

## 2021-05-03 PROCEDURE — 93010 ELECTROCARDIOGRAM REPORT: CPT | Performed by: INTERNAL MEDICINE

## 2021-05-03 PROCEDURE — C9803 HOPD COVID-19 SPEC COLLECT: HCPCS | Performed by: PHYSICIAN ASSISTANT

## 2021-05-03 PROCEDURE — 71045 X-RAY EXAM CHEST 1 VIEW: CPT

## 2021-05-03 PROCEDURE — 84484 ASSAY OF TROPONIN QUANT: CPT | Performed by: PHYSICIAN ASSISTANT

## 2021-05-03 PROCEDURE — 99283 EMERGENCY DEPT VISIT LOW MDM: CPT

## 2021-05-03 PROCEDURE — U0004 COV-19 TEST NON-CDC HGH THRU: HCPCS | Performed by: PHYSICIAN ASSISTANT

## 2021-05-03 PROCEDURE — 93005 ELECTROCARDIOGRAM TRACING: CPT | Performed by: PHYSICIAN ASSISTANT

## 2021-05-03 RX ORDER — BENZONATATE 200 MG/1
200 CAPSULE ORAL 3 TIMES DAILY PRN
Qty: 21 CAPSULE | Refills: 0 | Status: SHIPPED | OUTPATIENT
Start: 2021-05-03 | End: 2021-06-28

## 2021-05-03 RX ORDER — SODIUM CHLORIDE 0.9 % (FLUSH) 0.9 %
10 SYRINGE (ML) INJECTION AS NEEDED
Status: DISCONTINUED | OUTPATIENT
Start: 2021-05-03 | End: 2021-05-03 | Stop reason: HOSPADM

## 2021-05-03 RX ORDER — ALBUTEROL SULFATE 1.25 MG/3ML
1 SOLUTION RESPIRATORY (INHALATION) EVERY 6 HOURS PRN
Qty: 30 EACH | Refills: 4 | Status: SHIPPED | OUTPATIENT
Start: 2021-05-03

## 2021-05-03 NOTE — TELEPHONE ENCOUNTER
----- Message from Machelle Ortega sent at 5/3/2021 11:57 AM EDT -----  Regarding: Prescription Question  Contact: [phone]  Hi, I was seen in the ER today for shortness of breath, coughing and the doc seems to think this is viral. He is writing me a script for a new rescue inhaler but the tubes of albuteral I have for my nebulizer are out dated. I wondered if you could send a script to the pharmacy.    He tested me for COVID, even though I have had the vaccinations, and I will get the results later today or tomorrow.  Thanks!  Machelle Ortega

## 2021-05-03 NOTE — TELEPHONE ENCOUNTER
Caller: Machelle Ortega    Relationship to patient: Self    Best call back number: [phone]    Chief complaint: SHORTNESS OF BREATH FOR 2 DAYS WITH COUGH.  HISTORY OF PNEUMONIA AND ASTHMA.    Patient directed to call 911 or go to their nearest emergency room.     Patient verbalized understanding: [x] Yes  [] No  If no, why?    Additional notes:PATIENT EXPRESSED GOOD UNDERSTANDING AND APPRECIATION.      PLEASE ADVISE.

## 2021-05-03 NOTE — TELEPHONE ENCOUNTER
I called to speak to Ms. Ortega further. She reports a cough w/ SOA x 2-3 days. She took a Mucinex yesterday and her sx were still present.     She reports she is on her way to [facility] ER due to her hx of pneumonia in the past and having to be hospitalized.    I told her we will be able to see any work-up she will have in the ER.    Pt verbalized she understood and thank you.

## 2021-06-08 DIAGNOSIS — N39.46 MIXED STRESS AND URGE URINARY INCONTINENCE: ICD-10-CM

## 2021-06-08 RX ORDER — SOLIFENACIN SUCCINATE 10 MG/1
TABLET, FILM COATED ORAL
Qty: 90 TABLET | Refills: 3 | Status: SHIPPED | OUTPATIENT
Start: 2021-06-08 | End: 2021-12-07 | Stop reason: SDUPTHER

## 2021-06-28 ENCOUNTER — OFFICE VISIT (OUTPATIENT)
Dept: SURGERY | Facility: CLINIC | Age: 56
End: 2021-06-28

## 2021-06-28 VITALS — WEIGHT: 196 LBS | HEIGHT: 64 IN | BODY MASS INDEX: 33.46 KG/M2

## 2021-06-28 DIAGNOSIS — D17.1 LIPOMA OF TORSO: Primary | ICD-10-CM

## 2021-06-28 PROCEDURE — 99241 PR OFFICE CONSULTATION NEW/ESTAB PATIENT 15 MIN: CPT | Performed by: SURGERY

## 2021-06-28 NOTE — PROGRESS NOTES
"IMPRESSION & PLAN:  Small lipoma left upper back.  Given small size and asymptomatic nature, no further work-up or treatment is warranted.  Instructed to return if it enlarges or becomes symptomatic.    CC: Mass on back, referred for consultation by Dr. Powell    HPI: 55-year-old lady presents with a \"lump on shoulder\" that has been present for 6 to 8 months.  No significant symptoms.    PE:    Awake, alert  On the left upper posterior back is a 1.5 cm subcutaneous freely movable nodule  "

## 2021-12-01 ENCOUNTER — OFFICE VISIT (OUTPATIENT)
Dept: INTERNAL MEDICINE | Facility: CLINIC | Age: 56
End: 2021-12-01

## 2021-12-01 VITALS
DIASTOLIC BLOOD PRESSURE: 62 MMHG | TEMPERATURE: 98.4 F | OXYGEN SATURATION: 96 % | HEIGHT: 64 IN | SYSTOLIC BLOOD PRESSURE: 130 MMHG | WEIGHT: 192.2 LBS | HEART RATE: 77 BPM | BODY MASS INDEX: 32.81 KG/M2

## 2021-12-01 DIAGNOSIS — E78.5 HYPERLIPIDEMIA, UNSPECIFIED HYPERLIPIDEMIA TYPE: ICD-10-CM

## 2021-12-01 DIAGNOSIS — N39.46 MIXED STRESS AND URGE URINARY INCONTINENCE: Chronic | ICD-10-CM

## 2021-12-01 DIAGNOSIS — K21.9 GASTROESOPHAGEAL REFLUX DISEASE, UNSPECIFIED WHETHER ESOPHAGITIS PRESENT: ICD-10-CM

## 2021-12-01 DIAGNOSIS — F41.9 ANXIETY: Chronic | ICD-10-CM

## 2021-12-01 DIAGNOSIS — K58.1 IRRITABLE BOWEL SYNDROME WITH CONSTIPATION: Primary | ICD-10-CM

## 2021-12-01 DIAGNOSIS — M51.36 DDD (DEGENERATIVE DISC DISEASE), LUMBAR: Chronic | ICD-10-CM

## 2021-12-01 DIAGNOSIS — Z12.31 ENCOUNTER FOR SCREENING MAMMOGRAM FOR MALIGNANT NEOPLASM OF BREAST: ICD-10-CM

## 2021-12-01 DIAGNOSIS — Z23 NEED FOR VACCINATION WITH 13-POLYVALENT PNEUMOCOCCAL CONJUGATE VACCINE: ICD-10-CM

## 2021-12-01 DIAGNOSIS — Z23 NEEDS FLU SHOT: ICD-10-CM

## 2021-12-01 PROBLEM — J11.1 INFLUENZA: Status: RESOLVED | Noted: 2020-01-15 | Resolved: 2021-12-01

## 2021-12-01 PROCEDURE — G0439 PPPS, SUBSEQ VISIT: HCPCS | Performed by: NURSE PRACTITIONER

## 2021-12-01 PROCEDURE — 99214 OFFICE O/P EST MOD 30 MIN: CPT | Performed by: NURSE PRACTITIONER

## 2021-12-01 PROCEDURE — 90471 IMMUNIZATION ADMIN: CPT | Performed by: NURSE PRACTITIONER

## 2021-12-01 PROCEDURE — 90472 IMMUNIZATION ADMIN EACH ADD: CPT | Performed by: NURSE PRACTITIONER

## 2021-12-01 PROCEDURE — 90670 PCV13 VACCINE IM: CPT | Performed by: NURSE PRACTITIONER

## 2021-12-01 PROCEDURE — 90686 IIV4 VACC NO PRSV 0.5 ML IM: CPT | Performed by: NURSE PRACTITIONER

## 2021-12-01 RX ORDER — MELOXICAM 15 MG/1
15 TABLET ORAL NIGHTLY
COMMUNITY
Start: 2021-11-22 | End: 2022-02-10 | Stop reason: ALTCHOICE

## 2021-12-01 NOTE — PROGRESS NOTES
Subjective   Machelle Ortega is a 56 y.o. female.         History of Present Illness     The following portions of the patient's history were reviewed and updated as appropriate: allergies, current medications, past social history and problem list.    Past Medical History:   Diagnosis Date   • Allergic 1984    Seasonal   • Anxiety    • Arthritis    • Asthma Seasonally   • Attention deficit hyperactivity disorder    • Balance problem    • Cataract 1996    Had cataracts surgery in 2021   • Chronic pain    • Claustrophobia    • DDD (degenerative disc disease), lumbar    • Depression    • Fatigue    • Fibromyalgia    • Fibromyalgia, primary 2006    See above   • Fibromyositis    • GERD (gastroesophageal reflux disease)    • History of mononucleosis    • Hyperlipidemia    • IBS (irritable bowel syndrome)    • Iron deficiency anemia    • Liver anomaly, congenital     ectopic liver   • Lumbago    • Mood disorder (HCC)    • Pneumonia 2015    Got again in 2020   • Scoliosis 2001   • Sleep apnea     Compliant w/ C-Pap   • Visual impairment 1970    I had lasix in 1990         Current Outpatient Medications:   •  albuterol (ACCUNEB) 1.25 MG/3ML nebulizer solution, Take 3 mL by nebulization Every 6 (Six) Hours As Needed for Wheezing., Disp: 30 each, Rfl: 4  •  albuterol sulfate  (90 Base) MCG/ACT inhaler, Inhale 2 puffs Every 4 (Four) Hours As Needed for Wheezing., Disp: 6.7 g, Rfl: 1  •  buPROPion XL (WELLBUTRIN XL) 150 MG 24 hr tablet, Take 150 mg by mouth Every Morning., Disp: , Rfl:   •  cholecalciferol (VITAMIN D3) 25 MCG (1000 UT) tablet, Take 1,000 Units by mouth Daily., Disp: , Rfl:   •  cyclobenzaprine (FLEXERIL) 10 MG tablet, Take 1 tablet by mouth 2 (Two) Times a Day As Needed for Muscle Spasms., Disp: 90 tablet, Rfl: 3  •  FLUoxetine (PROzac) 20 MG capsule, TAKE 3 CAPSULES BY MOUTH EVERY NIGHT., Disp: 270 capsule, Rfl: 2  •  LORazepam (ATIVAN) 0.5 MG tablet, TAKE 1 TABLET BY MOUTH EVERY 8 HOURS AS NEEDED FOR  "ANXIETY, Disp: 30 tablet, Rfl: 0  •  meloxicam (MOBIC) 15 MG tablet, Take 15 mg by mouth Every Night., Disp: , Rfl:   •  pantoprazole (PROTONIX) 40 MG EC tablet, Take 1 tablet by mouth Every Evening., Disp: 90 tablet, Rfl: 2  •  solifenacin (VESICARE) 10 MG tablet, TAKE 1 TABLET BY MOUTH EVERY DAY, Disp: 90 tablet, Rfl: 3  •  traZODone (DESYREL) 100 MG tablet, TAKE 2 TO 3 TABLET BY MOUTH AT BEDTIME, Disp: 270 tablet, Rfl: 2    Allergies   Allergen Reactions   • Budesonide-Formoterol Fumarate Swelling     Throat   • Oxycodone-Acetaminophen Hives   • Pregabalin Other (See Comments)     Weight gain       Review of Systems    Objective   Vitals:    12/01/21 1442   BP: 130/62   BP Location: Left arm   Patient Position: Sitting   Cuff Size: Adult   Pulse: 77   Temp: 98.4 °F (36.9 °C)   TempSrc: Temporal   SpO2: 96%   Weight: 87.2 kg (192 lb 3.2 oz)   Height: 162.6 cm (64.02\")     Body mass index is 32.97 kg/m².  Physical Exam    Assessment/Plan   Diagnoses and all orders for this visit:    1. Needs flu shot (Primary)  -     FluLaval/Fluarix/Fluzone >6 Months ([id number])               "

## 2021-12-02 LAB
ALBUMIN SERPL-MCNC: 4.1 G/DL (ref 3.8–4.9)
ALBUMIN/GLOB SERPL: 1.8 {RATIO} (ref 1.2–2.2)
ALP SERPL-CCNC: 121 IU/L (ref 44–121)
ALT SERPL-CCNC: 14 IU/L (ref 0–32)
AST SERPL-CCNC: 17 IU/L (ref 0–40)
BASOPHILS # BLD AUTO: 0 X10E3/UL (ref 0–0.2)
BASOPHILS NFR BLD AUTO: 1 %
BILIRUB SERPL-MCNC: <0.2 MG/DL (ref 0–1.2)
BUN SERPL-MCNC: 17 MG/DL (ref 6–24)
BUN/CREAT SERPL: 21 (ref 9–23)
CALCIUM SERPL-MCNC: 9.4 MG/DL (ref 8.7–10.2)
CHLORIDE SERPL-SCNC: 104 MMOL/L (ref 96–106)
CHOLEST SERPL-MCNC: 214 MG/DL (ref 100–199)
CO2 SERPL-SCNC: 24 MMOL/L (ref 20–29)
CREAT SERPL-MCNC: 0.8 MG/DL (ref 0.57–1)
EOSINOPHIL # BLD AUTO: 0.1 X10E3/UL (ref 0–0.4)
EOSINOPHIL NFR BLD AUTO: 3 %
ERYTHROCYTE [DISTWIDTH] IN BLOOD BY AUTOMATED COUNT: 12.5 % (ref 11.7–15.4)
GLOBULIN SER CALC-MCNC: 2.3 G/DL (ref 1.5–4.5)
GLUCOSE SERPL-MCNC: 80 MG/DL (ref 65–99)
HCT VFR BLD AUTO: 37 % (ref 34–46.6)
HDLC SERPL-MCNC: 48 MG/DL
HGB BLD-MCNC: 12.4 G/DL (ref 11.1–15.9)
IMM GRANULOCYTES # BLD AUTO: 0 X10E3/UL (ref 0–0.1)
IMM GRANULOCYTES NFR BLD AUTO: 0 %
LDLC SERPL CALC-MCNC: 124 MG/DL (ref 0–99)
LYMPHOCYTES # BLD AUTO: 1.5 X10E3/UL (ref 0.7–3.1)
LYMPHOCYTES NFR BLD AUTO: 34 %
MCH RBC QN AUTO: 28.4 PG (ref 26.6–33)
MCHC RBC AUTO-ENTMCNC: 33.5 G/DL (ref 31.5–35.7)
MCV RBC AUTO: 85 FL (ref 79–97)
MONOCYTES # BLD AUTO: 0.4 X10E3/UL (ref 0.1–0.9)
MONOCYTES NFR BLD AUTO: 10 %
NEUTROPHILS # BLD AUTO: 2.2 X10E3/UL (ref 1.4–7)
NEUTROPHILS NFR BLD AUTO: 52 %
PLATELET # BLD AUTO: 277 X10E3/UL (ref 150–450)
POTASSIUM SERPL-SCNC: 4.4 MMOL/L (ref 3.5–5.2)
PROT SERPL-MCNC: 6.4 G/DL (ref 6–8.5)
RBC # BLD AUTO: 4.36 X10E6/UL (ref 3.77–5.28)
SODIUM SERPL-SCNC: 143 MMOL/L (ref 134–144)
TRIGL SERPL-MCNC: 241 MG/DL (ref 0–149)
TSH SERPL DL<=0.005 MIU/L-ACNC: 2.15 UIU/ML (ref 0.45–4.5)
VLDLC SERPL CALC-MCNC: 42 MG/DL (ref 5–40)
WBC # BLD AUTO: 4.3 X10E3/UL (ref 3.4–10.8)

## 2021-12-03 NOTE — PROGRESS NOTES
Your hemoglobin and hematocrit are normal and without anemia.  Kidney and liver function as well as electrolytes are normal.  Your cholesterol is mildly elevated (goal is for LDL to be under 100)-continue low-fat, low-cholesterol diet along with regular exercise.  Thyroid function is normal.

## 2021-12-05 DIAGNOSIS — F41.9 ANXIETY: ICD-10-CM

## 2021-12-06 RX ORDER — FLUOXETINE HYDROCHLORIDE 20 MG/1
60 CAPSULE ORAL NIGHTLY
Qty: 270 CAPSULE | Refills: 2 | Status: SHIPPED | OUTPATIENT
Start: 2021-12-06 | End: 2021-12-07 | Stop reason: SDUPTHER

## 2021-12-07 DIAGNOSIS — F41.9 ANXIETY: ICD-10-CM

## 2021-12-07 DIAGNOSIS — F33.9 MONOPOLAR DEPRESSION (HCC): ICD-10-CM

## 2021-12-07 DIAGNOSIS — F51.01 PRIMARY INSOMNIA: ICD-10-CM

## 2021-12-07 DIAGNOSIS — N39.46 MIXED STRESS AND URGE URINARY INCONTINENCE: ICD-10-CM

## 2021-12-07 DIAGNOSIS — K21.9 GASTROESOPHAGEAL REFLUX DISEASE: ICD-10-CM

## 2021-12-07 RX ORDER — PANTOPRAZOLE SODIUM 40 MG/1
40 TABLET, DELAYED RELEASE ORAL EVERY EVENING
Qty: 90 TABLET | Refills: 0 | Status: SHIPPED | OUTPATIENT
Start: 2021-12-07 | End: 2021-12-23

## 2021-12-07 RX ORDER — TRAZODONE HYDROCHLORIDE 100 MG/1
TABLET ORAL
Qty: 270 TABLET | Refills: 0 | Status: SHIPPED | OUTPATIENT
Start: 2021-12-07 | End: 2023-03-14 | Stop reason: SDUPTHER

## 2021-12-07 RX ORDER — SOLIFENACIN SUCCINATE 10 MG/1
10 TABLET, FILM COATED ORAL DAILY
Qty: 90 TABLET | Refills: 0 | Status: SHIPPED | OUTPATIENT
Start: 2021-12-07 | End: 2022-07-18 | Stop reason: SDUPTHER

## 2021-12-07 RX ORDER — FLUOXETINE HYDROCHLORIDE 20 MG/1
60 CAPSULE ORAL NIGHTLY
Qty: 270 CAPSULE | Refills: 0 | Status: SHIPPED | OUTPATIENT
Start: 2021-12-07 | End: 2023-03-14 | Stop reason: SDUPTHER

## 2021-12-07 RX ORDER — BUPROPION HYDROCHLORIDE 150 MG/1
150 TABLET ORAL EVERY MORNING
Qty: 90 TABLET | Refills: 0 | Status: SHIPPED | OUTPATIENT
Start: 2021-12-07 | End: 2022-07-18 | Stop reason: SDUPTHER

## 2021-12-12 PROBLEM — F41.9 ANXIETY: Chronic | Status: ACTIVE | Noted: 2020-02-07

## 2021-12-12 PROBLEM — N39.46 MIXED STRESS AND URGE URINARY INCONTINENCE: Chronic | Status: ACTIVE | Noted: 2020-05-21

## 2021-12-12 NOTE — PROGRESS NOTES
"Chief Complaint  Medicare Wellness-subsequent (awv), Hyperlipidemia, Anxiety, Heartburn, and IBS    Subjective          Machelle Ortega presents to [facility] for f/u regarding IBS, GERD, hyperlipidemia and anxiety.    History of Present Illness    Objective   Vital Signs:   /62 (BP Location: Left arm, Patient Position: Sitting, Cuff Size: Adult)   Pulse 77   Temp 98.4 °F (36.9 °C) (Temporal)   Ht 162.6 cm (64.02\")   Wt 87.2 kg (192 lb 3.2 oz)   SpO2 96%   BMI 32.97 kg/m²     Physical Exam  Constitutional:       Appearance: She is well-developed. She is not ill-appearing.   HENT:      Head: Normocephalic.      Right Ear: Hearing, tympanic membrane and external ear normal.      Left Ear: Hearing, tympanic membrane and external ear normal.      Nose: Nose normal. No nasal deformity, mucosal edema or rhinorrhea.      Right Sinus: No maxillary sinus tenderness or frontal sinus tenderness.      Left Sinus: No maxillary sinus tenderness or frontal sinus tenderness.      Mouth/Throat:      Dentition: Normal dentition.   Eyes:      General: Lids are normal.         Right eye: No discharge.         Left eye: No discharge.      Conjunctiva/sclera: Conjunctivae normal.      Right eye: No exudate.     Left eye: No exudate.  Neck:      Thyroid: No thyroid mass or thyromegaly.      Vascular: No carotid bruit.      Trachea: Trachea normal.   Cardiovascular:      Rate and Rhythm: Regular rhythm.      Pulses: Normal pulses.      Heart sounds: Normal heart sounds. No murmur heard.      Pulmonary:      Effort: No respiratory distress.      Breath sounds: Normal breath sounds. No decreased breath sounds, wheezing, rhonchi or rales.   Abdominal:      General: Bowel sounds are normal.      Palpations: Abdomen is soft.      Tenderness: There is no abdominal tenderness.   Musculoskeletal:      Cervical back: Normal range of motion. No edema.   Lymphadenopathy:      Head:      Right side of head: No " submental, submandibular, tonsillar, preauricular, posterior auricular or occipital adenopathy.      Left side of head: No submental, submandibular, tonsillar, preauricular, posterior auricular or occipital adenopathy.   Skin:     General: Skin is warm and dry.      Nails: There is no clubbing.   Neurological:      Mental Status: She is alert.   Psychiatric:         Behavior: Behavior is cooperative.        Result Review :     The following data was reviewed by: GOMEZ Godwin on 12/01/2021:  Common labs    Common Labsle 5/3/21 5/3/21 12/1/21 12/1/21 12/1/21    1019 1019 1602 1602 1602   Glucose  93  80    BUN  16  17    Creatinine  0.71  0.80    eGFR Non  Am  85  83    eGFR African Am    95    Sodium  139  143    Potassium  4.1  4.4    Chloride  106  104    Calcium  9.1  9.4    Total Protein    6.4    Albumin    4.1    Total Bilirubin    <0.2    Alkaline Phosphatase    121    AST (SGOT)    17    ALT (SGPT)    14    WBC 3.43  4.3     Hemoglobin 11.9 (A)  12.4     Hematocrit 35.9  37.0     Platelets 270  277     Total Cholesterol     214 (A)   Triglycerides     241 (A)   HDL Cholesterol     48   LDL Cholesterol      124 (A)   (A) Abnormal value       Comments are available for some flowsheets but are not being displayed.                     Assessment and Plan    Diagnoses and all orders for this visit:    1. Irritable bowel syndrome with constipation (Primary)  Assessment & Plan:  She complains of abdominal bloating and discomfort which is chronic but stable.      2. Hyperlipidemia, unspecified hyperlipidemia type  Assessment & Plan:  She tries to follow a low-fat, low-cholesterol diet.  Check lipid panel today.    Orders:  -     CBC & Differential  -     Comprehensive Metabolic Panel  -     Lipid Panel  -     TSH    3. Gastroesophageal reflux disease, unspecified whether esophagitis present  Assessment & Plan:  Sx stable on daily Protonix      4. Mixed stress and urge urinary incontinence  Assessment  & Plan:  Symptoms managed on Vesicare and Myrbetriq      5. Anxiety  Assessment & Plan:  Sx stable on Wellbutrin and Prozac with infrequent use of Ativan      6. DDD (degenerative disc disease), lumbar  Assessment & Plan:  She c/o low back pain and stiffness, managed on Meloxicam daily which she is tolerating well.      7. Encounter for screening mammogram for malignant neoplasm of breast  -     Mammo Screening Bilateral With CAD; Future    8. Needs flu shot  -     FluLaval/Fluarix/Fluzone >6 Months (6311-5766)    9. Need for vaccination with 13-polyvalent pneumococcal conjugate vaccine  -     Pneumococcal Conjugate Vaccine 13-Valent All (PCV13)    Paperwork completed for [employer].      Follow Up   No follow-ups on file.  Patient was given instructions and counseling regarding her condition or for health maintenance advice. Please see specific information pulled into the AVS if appropriate.

## 2021-12-12 NOTE — PROGRESS NOTES
The ABCs of the Annual Wellness Visit  Subsequent Medicare Wellness Visit    Chief Complaint   Patient presents with   • Medicare Wellness-subsequent     awv   • Hyperlipidemia   • Anxiety   • Heartburn   • IBS      Subjective    History of Present Illness:  Machelle Ortega is a 56 y.o. female who presents for a Subsequent Medicare Wellness Visit.    The following portions of the patient's history were reviewed and   updated as appropriate: allergies, current medications, past family history, past medical history, past social history, past surgical history and problem list.    Compared to one year ago, the patient feels her physical health is the same.    Compared to one year ago, the patient feels her mental health is the same.    Recent Hospitalizations:  She was not admitted to the hospital during the last year.       Current Medical Providers:  Patient Care Team:  Iqra Powell MD as PCP - General (Internal Medicine)  Antionette Herndon MD as Consulting Physician (Obstetrics and Gynecology)  Andrew Hopkins III, MD as Surgeon (Thoracic Surgery)  Dayron Jarrett MD as Surgeon (Neurosurgery)  Vitaliy Schafer MD as Consulting Physician (Pulmonary Disease)  Mohit Natarajan MD as Surgeon (General Surgery)  Vicente Cheng MD as Consulting Physician (Pulmonary Disease)  Ranjit London MD as Consulting Physician (Pain Medicine)  Fatimah Manley MD as Consulting Physician (Orthopedic Surgery)  Neftali Nayak MD as Consulting Physician (Physical Medicine and Rehabilitation)    Outpatient Medications Prior to Visit   Medication Sig Dispense Refill   • albuterol (ACCUNEB) 1.25 MG/3ML nebulizer solution Take 3 mL by nebulization Every 6 (Six) Hours As Needed for Wheezing. 30 each 4   • albuterol sulfate  (90 Base) MCG/ACT inhaler Inhale 2 puffs Every 4 (Four) Hours As Needed for Wheezing. 6.7 g 1   • cholecalciferol (VITAMIN D3) 25 MCG (1000 UT) tablet Take 1,000 Units by  mouth Daily.     • cyclobenzaprine (FLEXERIL) 10 MG tablet Take 1 tablet by mouth 2 (Two) Times a Day As Needed for Muscle Spasms. 90 tablet 3   • LORazepam (ATIVAN) 0.5 MG tablet TAKE 1 TABLET BY MOUTH EVERY 8 HOURS AS NEEDED FOR ANXIETY 30 tablet 0   • meloxicam (MOBIC) 15 MG tablet Take 15 mg by mouth Every Night.     • buPROPion XL (WELLBUTRIN XL) 150 MG 24 hr tablet Take 150 mg by mouth Every Morning.     • FLUoxetine (PROzac) 20 MG capsule TAKE 3 CAPSULES BY MOUTH EVERY NIGHT. 270 capsule 2   • pantoprazole (PROTONIX) 40 MG EC tablet Take 1 tablet by mouth Every Evening. 90 tablet 2   • solifenacin (VESICARE) 10 MG tablet TAKE 1 TABLET BY MOUTH EVERY DAY 90 tablet 3   • traZODone (DESYREL) 100 MG tablet TAKE 2 TO 3 TABLET BY MOUTH AT BEDTIME 270 tablet 2     No facility-administered medications prior to visit.       No opioid medication identified on active medication list. I have reviewed chart for other potential  high risk medication/s and harmful drug interactions in the elderly.          Aspirin is not on active medication list.  Aspirin use is not indicated based on review of current medical condition/s. Risk of harm outweighs potential benefits.  .    Patient Active Problem List   Diagnosis   • Ataxic gait   • Cervical pain   • Chest pain, pleuritic   • DDD (degenerative disc disease), cervical   • DDD (degenerative disc disease), lumbar   • Dry cough   • Extremity pain   • Low back pain   • Chronic nausea   • Loss of feeling or sensation   • Pain in thoracic spine   • Pleural cavity effusion   • Breath shortness   • Cervical spinal stenosis   • IBS (irritable bowel syndrome)   • GERD (gastroesophageal reflux disease)   • Multiple joint complaints   • Attention deficit disorder (ADD) without hyperactivity   • Fibromyalgia   • Mood disorder (HCC)   • Fatigue   • Sleep apnea   • Hyperlipidemia   • History of benign schwannoma   • Other cervical disc degeneration, high cervical region   • Chronic pain   •  "Postlaminectomy syndrome, thoracic   • Diaphragmatic hernia without obstruction and without gangrene   • Lumbar radiculopathy   • History of spinal surgery   • Neuropathic pain   • Balance disorder   • Primary insomnia   • Frequent episodes of pneumonia   • Hypokalemia   • Anxiety   • Frequent episodes of bronchitis   • Anemia   • Weakness of both legs   • Mixed stress and urge urinary incontinence   • Cervical radiculopathy   • History of benign spinal cord tumor   • Intradural extramedullary spinal tumor   • Spinal stenosis of lumbar region with neurogenic claudication   • Mild intermittent asthma   • Monopolar depression (HCC)   • Chronic pain of both shoulders     Advance Care Planning  Advance Directive is not on file.  ACP discussion was held with the patient during this visit. Patient has an advance directive (not in EMR), copy requested.          Objective    Vitals:    12/01/21 1442   BP: 130/62   BP Location: Left arm   Patient Position: Sitting   Cuff Size: Adult   Pulse: 77   Temp: 98.4 °F (36.9 °C)   TempSrc: Temporal   SpO2: 96%   Weight: 87.2 kg (192 lb 3.2 oz)   Height: 162.6 cm (64.02\")   PainSc:   4   PainLoc: Shoulder     BMI Readings from Last 1 Encounters:   12/01/21 32.97 kg/m²   BMI is above normal parameters. Recommendations include: nutrition counseling    Does the patient have evidence of cognitive impairment? No    Physical Exam  Lab Results   Component Value Date    CHLPL 214 (H) 12/01/2021    TRIG 241 (H) 12/01/2021    HDL 48 12/01/2021     (H) 12/01/2021    VLDL 42 (H) 12/01/2021            HEALTH RISK ASSESSMENT    Smoking Status:  Social History     Tobacco Use   Smoking Status Never Smoker   Smokeless Tobacco Never Used     Alcohol Consumption:  Social History     Substance and Sexual Activity   Alcohol Use Not Currently   • Alcohol/week: 0.0 standard drinks    Comment: Socially on holidays     Fall Risk Screen:    KALEB Fall Risk Assessment was completed, and patient is at " HIGH risk for falls. Assessment completed on:12/1/2021    Depression Screening:  PHQ-2/PHQ-9 Depression Screening 12/1/2021   Little interest or pleasure in doing things 1   Feeling down, depressed, or hopeless 2   Trouble falling or staying asleep, or sleeping too much 0   Feeling tired or having little energy 3   Poor appetite or overeating 0   Feeling bad about yourself - or that you are a failure or have let yourself or your family down 1   Trouble concentrating on things, such as reading the newspaper or watching television 1   Moving or speaking so slowly that other people could have noticed. Or the opposite - being so fidgety or restless that you have been moving around a lot more than usual 0   Thoughts that you would be better off dead, or of hurting yourself in some way 0   Total Score 8   If you checked off any problems, how difficult have these problems made it for you to do your work, take care of things at home, or get along with other people? Somewhat difficult       Health Habits and Functional and Cognitive Screening:  Functional & Cognitive Status 12/1/2021   Do you have difficulty preparing food and eating? No   Do you have difficulty bathing yourself, getting dressed or grooming yourself? No   Do you have difficulty using the toilet? No   Do you have difficulty moving around from place to place? No   Do you have trouble with steps or getting out of a bed or a chair? Yes   Current Diet Well Balanced Diet   Dental Exam Up to date   Eye Exam Up to date   Exercise (times per week) 7 times per week   Current Exercises Include Walking   Current Exercise Activities Include -   Do you need help using the phone?  No   Are you deaf or do you have serious difficulty hearing?  No   Do you need help with transportation? No   Do you need help shopping? No   Do you need help preparing meals?  No   Do you need help with housework?  No   Do you need help with laundry? No   Do you need help taking your  medications? No   Do you need help managing money? No   Do you ever drive or ride in a car without wearing a seat belt? No   Have you felt unusual stress, anger or loneliness in the last month? Yes   Who do you live with? Spouse   If you need help, do you have trouble finding someone available to you? No   Have you been bothered in the last four weeks by sexual problems? No   Do you have difficulty concentrating, remembering or making decisions? Yes       Age-appropriate Screening Schedule:  Refer to the list below for future screening recommendations based on patient's age, sex and/or medical conditions. Orders for these recommended tests are listed in the plan section. The patient has been provided with a written plan.    Health Maintenance   Topic Date Due   • DXA SCAN  12/07/2017   • ZOSTER VACCINE (2 of 2) 07/24/2018   • MAMMOGRAM  06/24/2021   • LIPID PANEL  12/01/2022   • TDAP/TD VACCINES (2 - Td or Tdap) 02/06/2024   • PAP SMEAR  06/01/2024   • INFLUENZA VACCINE  Completed              Assessment/Plan   CMS Preventative Services Quick Reference  Risk Factors Identified During Encounter  Chronic Pain   Immunizations Discussed/Encouraged (specific Immunizations; Shingrix  The above risks/problems have been discussed with the patient.  Follow up actions/plans if indicated are seen below in the Assessment/Plan Section.  Pertinent information has been shared with the patient in the After Visit Summary.    Diagnoses and all orders for this visit:    1. Irritable bowel syndrome with constipation (Primary)  Assessment & Plan:  She complains of abdominal bloating and discomfort which is chronic but stable.      2. Hyperlipidemia, unspecified hyperlipidemia type  Assessment & Plan:  She tries to follow a low-fat, low-cholesterol diet.  Check lipid panel today.    Orders:  -     CBC & Differential  -     Comprehensive Metabolic Panel  -     Lipid Panel  -     TSH    3. Gastroesophageal reflux disease, unspecified  whether esophagitis present  Assessment & Plan:  Sx stable on daily Protonix      4. Mixed stress and urge urinary incontinence  Assessment & Plan:  Symptoms managed on Vesicare and Myrbetriq      5. Anxiety  Assessment & Plan:  Sx stable on Wellbutrin and Prozac with infrequent use of Ativan      6. DDD (degenerative disc disease), lumbar  Assessment & Plan:  She c/o low back pain and stiffness, managed on Meloxicam daily which she is tolerating well.      7. Encounter for screening mammogram for malignant neoplasm of breast  -     Mammo Screening Bilateral With CAD; Future    8. Needs flu shot  -     FluLaval/Fluarix/Fluzone >6 Months ([id number])    9. Need for vaccination with 13-polyvalent pneumococcal conjugate vaccine  -     Pneumococcal Conjugate Vaccine 13-Valent All (PCV13)      Follow Up:   No follow-ups on file.     An After Visit Summary and PPPS were made available to the patient.

## 2021-12-12 NOTE — PATIENT INSTRUCTIONS
Medicare Wellness  Personal Prevention Plan of Service     Date of Office Visit:  2021  Encounter Provider:  GOMEZ Godwin  Place of Service:  [facility]  Patient Name: Machelle Ortega  :  1965    As part of the Medicare Wellness portion of your visit today, we are providing you with this personalized preventive plan of services (PPPS). This plan is based upon recommendations of the [place] Preventive Services Task Force (USPSTF) and the Advisory Committee on Immunization Practices (ACIP).    This lists the preventive care services that should be considered, and provides dates of when you are due. Items listed as completed are up-to-date and do not require any further intervention.    Health Maintenance   Topic Date Due   • DXA SCAN  2017   • ZOSTER VACCINE (2 of 2) 2018   • ANNUAL WELLNESS VISIT  2021   • MAMMOGRAM  2021   • COVID-19 Vaccine (3 - Booster for Pfizer series) 10/08/2021   • Pneumococcal Vaccine 0-64 (1 of 2 - PPSV23) 2022   • LIPID PANEL  2022   • TDAP/TD VACCINES (2 - Td or Tdap) 2024   • PAP SMEAR  2024   • COLORECTAL CANCER SCREENING  2027   • HEPATITIS C SCREENING  Completed   • INFLUENZA VACCINE  Completed       Orders Placed This Encounter   Procedures   • Mammo Screening Bilateral With CAD     Standing Status:   Future     Standing Expiration Date:   2022     Scheduling Instructions:      [facility]     Order Specific Question:   Reason for Exam:     Answer:   Screening   • FluLaval/Fluarix/Fluzone >6 Months ([id number])   • Pneumococcal Conjugate Vaccine 13-Valent All (PCV13)   • Comprehensive Metabolic Panel     Order Specific Question:   Release to patient     Answer:   Immediate   • Lipid Panel   • TSH     Order Specific Question:   Release to patient     Answer:   Immediate   • CBC & Differential     Order Specific Question:   Manual Differential     Answer:   No       No  follow-ups on file.

## 2021-12-23 RX ORDER — OMEPRAZOLE 40 MG/1
40 CAPSULE, DELAYED RELEASE ORAL DAILY
Qty: 90 CAPSULE | Refills: 1 | Status: SHIPPED | OUTPATIENT
Start: 2021-12-23 | End: 2022-02-17

## 2022-01-31 ENCOUNTER — TELEPHONE (OUTPATIENT)
Dept: NEUROSURGERY | Facility: CLINIC | Age: 57
End: 2022-01-31

## 2022-02-01 RX ORDER — METHYLPREDNISOLONE 4 MG/1
TABLET ORAL
Qty: 21 TABLET | Refills: 0 | Status: SHIPPED | OUTPATIENT
Start: 2022-02-01 | End: 2022-02-10

## 2022-02-01 NOTE — TELEPHONE ENCOUNTER
Order for MDP entered and I scheduled patient for a follow up visit on 2/7/22 @ 2:00.  She is aware.

## 2022-02-09 NOTE — PROGRESS NOTES
Subjective   Patient ID: Machelle Ortega is a 56 y.o. female is here today for follow-up after MDP for arm and shoulder pain. She was last seen in August 2020 and was sent to physical therapy, was supposed to follow-up after physical therapy and had shingles at that time. She has history of C3-6 ACCF. 2/13/2014 and T10/11 laminectomy for removal of schwannoma in 2006 by Dr. Phelan.  She reports quite a few complications following the surgery.  She had a spinal cord stimulator placed in 2017 for chronic low back pain.. She has a known broken screw at C6 which has been determined as clinically insignificant per Dr. Jarrett's previous office visit note.    History of Present Illness  Today Ms. Ortega reports 2-3 weeks of pain in the left shoulder radiating into the middle finger. Also having some right arm pain. She called into office and received prescription for Medrol Dosepak. She completed the MDP recentlyand she also had cortisone shot in her left shoulder. She reports Dr. Daniel with ortho started her on diclofenac instead of meloxicam.  This helps her quite a bit. She did have a few falls in the snow on a  trip in [place] in January. She denies injury or increase in neck pain. She reports while she was on a  trip, she was carrying a heavy back pack and had a lot of luggage which aggravated the arm pain. She reports at her worst she was experiencing shooting pains down her forearms and into her middle fingers. She denies any neck pain. She reports it is mostly in her left shoulder and down the left arm. She reports that at onset it was present in the right shoulder but has since resolved. Chronic balance issues. She does continue HEP from PT.    The following portions of the patient's history were reviewed and updated as appropriate: allergies, current medications and problem list.    Review of Systems   Musculoskeletal: Positive for arthralgias. Negative for neck pain.        Shoulder pain  "  Neurological: Positive for numbness. Negative for weakness.   Psychiatric/Behavioral: Negative for sleep disturbance.       Objective     Vitals:    02/10/22 1344   BP: 130/70   Pulse: 62   Temp: 98.2 °F (36.8 °C)   SpO2: 98%   Weight: 87 kg (191 lb 12.8 oz)   Height: 162.6 cm (64\")     Body mass index is 32.92 kg/m².      Physical Exam  Vitals reviewed.   Constitutional:       Appearance: Normal appearance.   Pulmonary:      Effort: Pulmonary effort is normal.   Musculoskeletal:      Right shoulder: Decreased range of motion ( Mild).      Left shoulder: Decreased range of motion ( Moderate unable to raise above elbow level). Decreased strength.      Cervical back: No tenderness or bony tenderness. No pain with movement. Decreased range of motion ( mildly).      Comments:      Skin:     General: Skin is warm and dry.   Neurological:      General: No focal deficit present.      Mental Status: She is alert.      Gait: Gait is intact.   Psychiatric:         Mood and Affect: Mood normal.         Speech: Speech normal.       Neurologic Exam     Mental Status   Speech: speech is normal   Level of consciousness: alert  Knowledge: good.   Normal comprehension.     Motor Exam   Muscle bulk: normal  Overall muscle tone: normal  5/5 bilateral upper extremity strength except deltoids bilaterally 4/5 due to shoulder pain     Sensory Exam   Right arm light touch: Reports some decreased sensation in index finger only.  Left arm light touch: Reports some decreased sensation in index finger only.    Gait, Coordination, and Reflexes     Gait  Gait: normal    Reflexes   Right Estevez: absent  Left Estevez: absent      Assessment/Plan   Independent Review of Radiographic Studies:      I personally reviewed the images from the following studies.    No new imaging    Per last office note with Dr. Jarrett and review of CT/myelogram cervical, thoracic, lumbar spine:  I reviewed the myelogram which showed the fusion from C3-C6.  There " is a broken screw at C6 but there is no displacement.  There is some mild facet disease below the fusion C6-C7.  There is some mild spinal stenosis at L4-L5.  There is some evidence of a small amount of probable schwannoma at the T10-11 level where the surgery took place.  Is hard to know if this is recurrent or residual.     Medical Decision Making:      Patient presents for follow-up of upper extremity pain.  She had a pneumonia shot in late December and had quite a bit of pain in her left shoulder from this.  It took multiple days to improve.  After this she went on a scouting trip and had to carry heavy baggage and luggage as well as dragging her materials through the snow on a sled-like device.  She states it was very grueling and she was quite exhausted physically from this event.  She had left shoulder pain from carrying the bag mainly.  Sometime later she began to notice the pain shooting down her arms and into her middle fingers.  This has improved some but she still has some discomfort.  No real neck pain.  She did states she had a few falls into the snow during her travels but denies any injury.  No new imbalance issues although chronic issues present.  She has no real red flags on exam.  She has chronic shoulder issues has quite a bit of reduction in range of motion particularly with the left arm.  She has a lot of discomfort in the left shoulder with range of motion.  I do not think she has a Spurling but a true joint issue.  Her prior CT/myelogram showed some facet disease at C6/7 which could give her some neck pain but really no radiculopathy.  She has a known fracture of the C6 screw which is chronic.  She has giveaway weakness due to her shoulder issues but otherwise no sensory or strength issues of concern.  Her gait is okay.  She tried a Medrol Dosepak which may have given her little benefit but she finds a diclofenac really helps her more than anything.  I offered her some physical therapy but  she states she is doing home exercise program for her shoulder at this point.  I do not see any need to do any extensive work-up as she has no real red flags and she seems to be slowly making improvements.  I told to give it some more time.  We will get a set of cervical x-rays just to ensure hardware placement is stable and no new alignment issues.  We will call her with those results otherwise see her back in a year to continue to follow her for the known schwannoma.  She will call in the meantime with any progressive pain or new neurologic issues.    Diagnoses and all orders for this visit:    1. Cervical spinal stenosis (Primary)  -     XR Spine Cervical Complete With Flex Ext; Future    2. Neck pain  -     XR Spine Cervical Complete With Flex Ext; Future      Return in about 1 year (around 2/10/2023) for Follow-up with Dr. Jarrett.

## 2022-02-10 ENCOUNTER — HOSPITAL ENCOUNTER (OUTPATIENT)
Dept: GENERAL RADIOLOGY | Facility: HOSPITAL | Age: 57
Discharge: HOME | End: 2022-02-10
Admitting: NURSE PRACTITIONER

## 2022-02-10 ENCOUNTER — OFFICE VISIT (OUTPATIENT)
Dept: NEUROSURGERY | Facility: CLINIC | Age: 57
End: 2022-02-10

## 2022-02-10 VITALS
HEIGHT: 64 IN | BODY MASS INDEX: 32.74 KG/M2 | OXYGEN SATURATION: 98 % | WEIGHT: 191.8 LBS | DIASTOLIC BLOOD PRESSURE: 70 MMHG | TEMPERATURE: 98.2 F | HEART RATE: 62 BPM | SYSTOLIC BLOOD PRESSURE: 130 MMHG

## 2022-02-10 DIAGNOSIS — M54.2 NECK PAIN: ICD-10-CM

## 2022-02-10 DIAGNOSIS — M48.02 CERVICAL SPINAL STENOSIS: Primary | ICD-10-CM

## 2022-02-10 DIAGNOSIS — M48.02 CERVICAL SPINAL STENOSIS: ICD-10-CM

## 2022-02-10 PROCEDURE — 99214 OFFICE O/P EST MOD 30 MIN: CPT | Performed by: NURSE PRACTITIONER

## 2022-02-10 PROCEDURE — 72052 X-RAY EXAM NECK SPINE 6/>VWS: CPT

## 2022-02-10 RX ORDER — DICLOFENAC SODIUM AND MISOPROSTOL 50; 200 MG/1; UG/1
1 TABLET, DELAYED RELEASE ORAL 2 TIMES DAILY
COMMUNITY
End: 2023-03-14

## 2022-02-14 ENCOUNTER — TELEPHONE (OUTPATIENT)
Dept: NEUROSURGERY | Facility: CLINIC | Age: 57
End: 2022-02-14

## 2022-02-14 NOTE — TELEPHONE ENCOUNTER
----- Message from GOMEZ Smith sent at 2/14/2022  3:27 PM EST -----  Please advise patient that I reviewed x-rays and there are no changes compared to prior cervical x-ray in July 2020.  Keep follow-up 1 year as discussed at office visit and call sooner for problems.

## 2022-02-14 NOTE — PROGRESS NOTES
Please advise patient that I reviewed x-rays and there are no changes compared to prior cervical x-ray in July 2020.  Keep follow-up 1 year as discussed at office visit and call sooner for problems.

## 2022-02-16 NOTE — TELEPHONE ENCOUNTER
----- Message from Machelle Ortega sent at 2/15/2022  4:20 PM EST -----  Regarding: Pantoprazole  Hailey,    My insurance no longer covers my pantoprazole. I tried using over the counter meds and nothing works.    Would you please send a 90 day script for Pantoprazole to [facility] pharmacy at [phone]? I will be able to use [facility] and can afford the meds out of pocket.     If you have any questions, please let me know.  Thank You!

## 2022-02-16 NOTE — TELEPHONE ENCOUNTER
Patient historically takes pantoprazole 40 mg once daily. This has not been filled since 2021. Omeprazole was given in its place, can new prescription be sent to [facility] pharmacy?

## 2022-02-17 RX ORDER — PANTOPRAZOLE SODIUM 40 MG/1
40 TABLET, DELAYED RELEASE ORAL DAILY
Qty: 90 TABLET | Refills: 1 | Status: SHIPPED | OUTPATIENT
Start: 2022-02-17 | End: 2022-07-18 | Stop reason: SDUPTHER

## 2022-03-20 DIAGNOSIS — F51.01 PRIMARY INSOMNIA: ICD-10-CM

## 2022-03-21 RX ORDER — TRAZODONE HYDROCHLORIDE 100 MG/1
TABLET ORAL
Qty: 270 TABLET | Refills: 0 | OUTPATIENT
Start: 2022-03-21

## 2022-06-02 DIAGNOSIS — N39.46 MIXED STRESS AND URGE URINARY INCONTINENCE: ICD-10-CM

## 2022-06-02 DIAGNOSIS — F51.01 PRIMARY INSOMNIA: ICD-10-CM

## 2022-06-02 RX ORDER — TRAZODONE HYDROCHLORIDE 100 MG/1
TABLET ORAL
Qty: 270 TABLET | Refills: 0 | OUTPATIENT
Start: 2022-06-02

## 2022-06-02 RX ORDER — SOLIFENACIN SUCCINATE 10 MG/1
TABLET, FILM COATED ORAL
Qty: 90 TABLET | Refills: 0 | OUTPATIENT
Start: 2022-06-02

## 2022-07-18 DIAGNOSIS — N39.46 MIXED STRESS AND URGE URINARY INCONTINENCE: ICD-10-CM

## 2022-07-18 DIAGNOSIS — F33.9 MONOPOLAR DEPRESSION: ICD-10-CM

## 2022-07-18 RX ORDER — PANTOPRAZOLE SODIUM 40 MG/1
40 TABLET, DELAYED RELEASE ORAL DAILY
Qty: 90 TABLET | Refills: 1 | Status: SHIPPED | OUTPATIENT
Start: 2022-07-18 | End: 2023-03-14 | Stop reason: SDUPTHER

## 2022-07-18 RX ORDER — SOLIFENACIN SUCCINATE 10 MG/1
10 TABLET, FILM COATED ORAL DAILY
Qty: 90 TABLET | Refills: 0 | Status: SHIPPED | OUTPATIENT
Start: 2022-07-18 | End: 2023-03-14 | Stop reason: SDUPTHER

## 2022-07-18 RX ORDER — BUPROPION HYDROCHLORIDE 150 MG/1
150 TABLET ORAL EVERY MORNING
Qty: 90 TABLET | Refills: 0 | Status: SHIPPED | OUTPATIENT
Start: 2022-07-18 | End: 2023-03-14 | Stop reason: SDUPTHER

## 2022-08-30 DIAGNOSIS — F41.9 ANXIETY: ICD-10-CM

## 2022-08-30 RX ORDER — FLUOXETINE HYDROCHLORIDE 20 MG/1
60 CAPSULE ORAL NIGHTLY
Qty: 270 CAPSULE | Refills: 0 | OUTPATIENT
Start: 2022-08-30

## 2022-10-03 ENCOUNTER — APPOINTMENT (OUTPATIENT)
Dept: GENERAL RADIOLOGY | Facility: HOSPITAL | Age: 57
End: 2022-10-03

## 2022-10-03 ENCOUNTER — HOSPITAL ENCOUNTER (EMERGENCY)
Facility: HOSPITAL | Age: 57
Discharge: HOME | End: 2022-10-03
Attending: EMERGENCY MEDICINE | Admitting: EMERGENCY MEDICINE

## 2022-10-03 VITALS
RESPIRATION RATE: 18 BRPM | HEART RATE: 58 BPM | DIASTOLIC BLOOD PRESSURE: 75 MMHG | OXYGEN SATURATION: 97 % | TEMPERATURE: 98.9 F | SYSTOLIC BLOOD PRESSURE: 120 MMHG

## 2022-10-03 DIAGNOSIS — E78.00 HYPERCHOLESTEREMIA: ICD-10-CM

## 2022-10-03 DIAGNOSIS — R07.89 ATYPICAL CHEST PAIN: Primary | ICD-10-CM

## 2022-10-03 LAB
ALBUMIN SERPL-MCNC: 4.3 G/DL (ref 3.5–5.2)
ALBUMIN/GLOB SERPL: 1.5 G/DL
ALP SERPL-CCNC: 99 U/L (ref 39–117)
ALT SERPL W P-5'-P-CCNC: 10 U/L (ref 1–33)
ANION GAP SERPL CALCULATED.3IONS-SCNC: 10.8 MMOL/L (ref 5–15)
AST SERPL-CCNC: 14 U/L (ref 1–32)
BASOPHILS # BLD AUTO: 0.03 10*3/MM3 (ref 0–0.2)
BASOPHILS NFR BLD AUTO: 0.6 % (ref 0–1.5)
BILIRUB SERPL-MCNC: 0.3 MG/DL (ref 0–1.2)
BUN SERPL-MCNC: 13 MG/DL (ref 6–20)
BUN/CREAT SERPL: 17.8 (ref 7–25)
CALCIUM SPEC-SCNC: 9.9 MG/DL (ref 8.6–10.5)
CHLORIDE SERPL-SCNC: 101 MMOL/L (ref 98–107)
CO2 SERPL-SCNC: 26.2 MMOL/L (ref 22–29)
CREAT SERPL-MCNC: 0.73 MG/DL (ref 0.57–1)
DEPRECATED RDW RBC AUTO: 41.8 FL (ref 37–54)
EGFRCR SERPLBLD CKD-EPI 2021: 96.7 ML/MIN/1.73
EOSINOPHIL # BLD AUTO: 0.11 10*3/MM3 (ref 0–0.4)
EOSINOPHIL NFR BLD AUTO: 2.3 % (ref 0.3–6.2)
ERYTHROCYTE [DISTWIDTH] IN BLOOD BY AUTOMATED COUNT: 13.4 % (ref 12.3–15.4)
GLOBULIN UR ELPH-MCNC: 2.8 GM/DL
GLUCOSE SERPL-MCNC: 90 MG/DL (ref 65–99)
HCT VFR BLD AUTO: 38.3 % (ref 34–46.6)
HGB BLD-MCNC: 12.5 G/DL (ref 12–15.9)
IMM GRANULOCYTES # BLD AUTO: 0.01 10*3/MM3 (ref 0–0.05)
IMM GRANULOCYTES NFR BLD AUTO: 0.2 % (ref 0–0.5)
LYMPHOCYTES # BLD AUTO: 1.37 10*3/MM3 (ref 0.7–3.1)
LYMPHOCYTES NFR BLD AUTO: 29.1 % (ref 19.6–45.3)
MCH RBC QN AUTO: 27.6 PG (ref 26.6–33)
MCHC RBC AUTO-ENTMCNC: 32.6 G/DL (ref 31.5–35.7)
MCV RBC AUTO: 84.5 FL (ref 79–97)
MONOCYTES # BLD AUTO: 0.3 10*3/MM3 (ref 0.1–0.9)
MONOCYTES NFR BLD AUTO: 6.4 % (ref 5–12)
NEUTROPHILS NFR BLD AUTO: 2.89 10*3/MM3 (ref 1.7–7)
NEUTROPHILS NFR BLD AUTO: 61.4 % (ref 42.7–76)
NRBC BLD AUTO-RTO: 0 /100 WBC (ref 0–0.2)
PLATELET # BLD AUTO: 275 10*3/MM3 (ref 140–450)
PMV BLD AUTO: 8.7 FL (ref 6–12)
POTASSIUM SERPL-SCNC: 4.3 MMOL/L (ref 3.5–5.2)
PROT SERPL-MCNC: 7.1 G/DL (ref 6–8.5)
QT INTERVAL: 428 MS
RBC # BLD AUTO: 4.53 10*6/MM3 (ref 3.77–5.28)
SODIUM SERPL-SCNC: 138 MMOL/L (ref 136–145)
TROPONIN T SERPL-MCNC: <0.01 NG/ML (ref 0–0.03)
WBC NRBC COR # BLD: 4.71 10*3/MM3 (ref 3.4–10.8)

## 2022-10-03 PROCEDURE — 93005 ELECTROCARDIOGRAM TRACING: CPT | Performed by: EMERGENCY MEDICINE

## 2022-10-03 PROCEDURE — 99283 EMERGENCY DEPT VISIT LOW MDM: CPT

## 2022-10-03 PROCEDURE — 80053 COMPREHEN METABOLIC PANEL: CPT | Performed by: PHYSICIAN ASSISTANT

## 2022-10-03 PROCEDURE — 84484 ASSAY OF TROPONIN QUANT: CPT | Performed by: PHYSICIAN ASSISTANT

## 2022-10-03 PROCEDURE — 93005 ELECTROCARDIOGRAM TRACING: CPT

## 2022-10-03 PROCEDURE — 71045 X-RAY EXAM CHEST 1 VIEW: CPT

## 2022-10-03 PROCEDURE — 85025 COMPLETE CBC W/AUTO DIFF WBC: CPT | Performed by: PHYSICIAN ASSISTANT

## 2022-10-03 PROCEDURE — 93010 ELECTROCARDIOGRAM REPORT: CPT | Performed by: INTERNAL MEDICINE

## 2022-10-03 RX ORDER — SODIUM CHLORIDE 0.9 % (FLUSH) 0.9 %
10 SYRINGE (ML) INJECTION AS NEEDED
Status: DISCONTINUED | OUTPATIENT
Start: 2022-10-03 | End: 2022-10-03 | Stop reason: HOSPADM

## 2022-10-03 NOTE — ED TRIAGE NOTES
All triage performed with this RN wearing appropriate PPE.  Pt placed in mask upon arrival to ED.    Patient c/o an episode of CP on Saturday am. She reports it lasted approx 15 minutes and was right sided with no radiation. She reports pulling her car over and her mouth watered and she was seeing spots. She reports feeling fine after the incident.

## 2022-10-03 NOTE — ED PROVIDER NOTES
EMERGENCY DEPARTMENT ENCOUNTER    Room Number:  01/01  Date of encounter:  10/3/2022  PCP: Iqra Powell MD  Historian: Patient      I used full protective equipment while examining this patient.  This includes face mask, gloves and protective eyewear.  I washed my hands before entering the room and immediately upon leaving the room      HPI:  Chief Complaint: Chest pain  A complete HPI/ROS/PMH/PSH/SH/FH are unobtainable due to: Nothing    Context: Machelle Ortega is a 56 y.o. female who presents to the ED c/o a now resolved episode of chest pain.  Patient states this occurred 2 days ago.  She was driving when she felt a bubble of pain in her right anterior chest wall.  Patient felt like this was indigestion at the time.  She then suddenly became nauseated, her mouth was watering, and her vision seemed spotty.  Patient is unsure if she possibly passed out.  She states all of the symptoms lasted less than 1 minute.  After 1 minute she felt completely back to her baseline.  She denied any associated palpitations, shortness of breath.    She denies any previous heart history.  She denies any history of hypertension, diabetes, smoking.  She does have a history of hypercholesterolemia.  Denies any concerning family history of heart problems.    Review of Medical Records  I reviewed patient's last neurosurgery office visit from 2/10/2022.  Patient being followed for spinal stenosis of the cervical spine.    PAST MEDICAL HISTORY  Active Ambulatory Problems     Diagnosis Date Noted   • Ataxic gait 06/07/2016   • Cervical pain 06/07/2016   • Chest pain, pleuritic 06/07/2016   • DDD (degenerative disc disease), cervical 06/07/2016   • DDD (degenerative disc disease), lumbar 06/07/2016   • Dry cough 06/07/2016   • Extremity pain 06/07/2016   • Low back pain 06/07/2016   • Chronic nausea 06/07/2016   • Loss of feeling or sensation 06/07/2016   • Pain in thoracic spine 06/07/2016   • Pleural cavity effusion 06/07/2016   •  Breath shortness 06/07/2016   • Cervical spinal stenosis 06/07/2016   • IBS (irritable bowel syndrome) 06/07/2016   • GERD (gastroesophageal reflux disease) 06/07/2016   • Multiple joint complaints 06/07/2016   • Attention deficit disorder (ADD) without hyperactivity 06/07/2016   • Fibromyalgia 06/07/2016   • Mood disorder (HCC) 06/07/2016   • Fatigue 06/07/2016   • Sleep apnea 06/07/2016   • Hyperlipidemia 06/07/2016   • History of benign schwannoma 01/09/2017   • Other cervical disc degeneration, high cervical region 01/09/2017   • Chronic pain 01/25/2017   • Postlaminectomy syndrome, thoracic 01/25/2017   • Diaphragmatic hernia without obstruction and without gangrene 02/27/2017   • Lumbar radiculopathy 07/17/2017   • History of spinal surgery 12/08/2017   • Neuropathic pain 12/28/2017   • Balance disorder 10/03/2018   • Primary insomnia 10/03/2018   • Frequent episodes of pneumonia 01/15/2020   • Hypokalemia 01/15/2020   • Anxiety 02/07/2020   • Frequent episodes of bronchitis 02/07/2020   • Anemia 02/07/2020   • Weakness of both legs 05/21/2020   • Mixed stress and urge urinary incontinence 05/21/2020   • Cervical radiculopathy 07/10/2020   • 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/10/2020   • 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   • Spinal stenosis of lumbar region with neurogenic claudication 08/10/2020   • Mild intermittent asthma 09/21/2020   • Monopolar depression (HCC) 09/21/2020   • Chronic pain of both shoulders 02/01/2021     Resolved Ambulatory Problems     Diagnosis Date Noted   • Influenza 01/15/2020     Past Medical History:   Diagnosis Date   • Allergic 1984   • Arthritis    • Asthma Seasonally   • Attention deficit hyperactivity disorder    • Balance problem    • Cataract 1996   • Claustrophobia    • Depression    • Fibromyalgia, primary 2006   • Fibromyositis    • History of mononucleosis    • Iron deficiency anemia    • Liver anomaly, congenital    • Lumbago    • Pneumonia 2015   •  Scoliosis    • Visual impairment 1970         PAST SURGICAL HISTORY  Past Surgical History:   Procedure Laterality Date   • APPENDECTOMY      Emergency performed by Darnell   • BLADDER NECK RECONSTRUCTION N/A    • BREAST BIOPSY  Several    On both sides   • BRONCHOSCOPY N/A 2014    Chronic cough with abnormal chest x-ray, the exam was normal, fluoroscopically guided transbronchial brushings were obtained, transbronchial lung biopsies were performed, bronchoalveolar lavage was performed-Dr. Vitaliy Schafer   • CERVICAL CORPECTOMY N/A 2014    Anterior cervical corpectomy at C4 and C5; Anterior cervical arthrodesis at C3-C4, C4-C5 and C5-C6; placement of instrumentation; use of intraoperative microscope for microdissection; harvest of vertebral body autograft through same incision; prep of morcellized allograft-Dr. Dayron Jarrett   •  SECTION N/A 2007    Dr. Briana Mcdaniel   • COLONOSCOPY N/A 3/22/2017    Procedure: COLONOSCOPY TO CECUM ;  Surgeon: Mohit Natarajan MD;  Location: [facility] ENDOSCOPY;  Service:    • D & C HYSTEROSCOPY N/A 2005    Hysteroscopy, D&C, and paracervical block-Dr. Antionette Herndon   • D & C HYSTEROSCOPY ENDOMETRIAL ABLATION N/A 09/15/2014    Hysteroscopy, D&C, NovaSure ablation-Dr. Antionette Herndon   • ENDOMETRIAL ABLATION     • ENDOSCOPY N/A 3/22/2017    Procedure: ESOPHAGOGASTRODUODENOSCOPY WITH BX ;  Surgeon: Mohit Natarajan MD;  Location: [facility] ENDOSCOPY;  Service:    • EYE SURGERY      Lasik   • LIVER SURGERY     • LUMBAR SPINAL TUMOR REMOVAL N/A     spinal tumor   T10/11 schwannoma   • LUNG LOBECTOMY Right 2014    Exploratory bronchoscopy, exploratory right video-assisted thoracoscopy, parietal pleural biopsy, diaphragmatic biopsy, wedge resection of right lower lobe, excision of ectopic hepatic lobe, primary repair of diagphragm, subpleural pain catheters x2, mechanical pleurodesis-Dr. Darryl Benjamin   • SPINAL CORD STIMULATOR  IMPLANT      TRIAL   • SPINAL CORD STIMULATOR IMPLANT Bilateral 2017    Procedure: SPINAL CORD STIMULATOR INSERTION PHASE 1,  laminotomy for placement of [employer] lead  from T9 to T6;  Surgeon: Dayron Jarrett MD;  [facility] MA[facility] OR;  Service:    • SPINAL CORD STIMULATOR IMPLANT N/A 2017    Procedure: SPINAL CORD STIMULATOR INSERTION PHASE 2;  Surgeon: Dayron Jarrett MD;  Location: [facility] MAIN OR;  Service:    • SPINE SURGERY  2006    Laminectomy to remove golf ball size schwannoma         FAMILY HISTORY  Family History   Problem Relation Age of Onset   • Alcohol abuse Other    • Arthritis Other    • Diabetes Other    • Drug abuse Other    • Hepatitis Other    • Hypertension Other    • Depression Mother    • Lung cancer Mother    • Thyroid disease Mother    • Arthritis Mother    • Cancer Mother         Lung cancer 2016   • Diabetes Mother    • Hyperlipidemia Mother    • Hypertension Mother    • Miscarriages / Stillbirths Mother    • Colon polyps Mother    • Anxiety disorder Mother    • Mental illness Mother    • Hashimoto's thyroiditis Daughter    • Thyroid disease Daughter         Hashimoto's   • Breast cancer Maternal Aunt    • Prostate cancer Maternal Uncle    • Cancer Maternal Uncle         Thymus and Pancreatic cancer ()   • Heart disease Paternal Grandmother    • Alcohol abuse Paternal Grandmother    • Asthma Paternal Grandmother    • Depression Paternal Grandmother    • Alzheimer's disease Paternal Grandfather    • Alcohol abuse Paternal Grandfather    • Depression Paternal Grandfather    • Alcohol abuse Brother    • Arthritis Brother    • Depression Brother    • Drug abuse Brother         Marijuana for chronic pain   • Learning disabilities Brother         ADHD   • Vision loss Brother         Corrective lenses   • Arthritis Maternal Grandmother    • Depression Maternal Grandmother    • Diabetes Maternal Grandmother    • Heart disease Maternal Grandmother    •  "Hyperlipidemia Maternal Grandmother    • Hypertension Maternal Grandmother    • Thyroid disease Maternal Grandmother    • Birth defects Maternal Uncle         CP   • Hyperlipidemia Maternal Uncle    • Cancer Maternal Grandfather         Kidney, bone cancer 1980's   • Cancer Maternal Aunt         Breast cancer 2003   • Depression Maternal Aunt    • Miscarriages / Stillbirths Maternal Aunt    • Vision loss Maternal Aunt         \"  \"   • Cancer Maternal Aunt         Lung cancer 2015   • Depression Maternal Aunt    • Diabetes Maternal Aunt    • Cancer Maternal Uncle         Prostate Cancer   • Vision loss Maternal Uncle         \"  \"   • Cancer Maternal Uncle         Thymus and Pancreatic Cancer   • Depression Father    • Diabetes Father    • Hyperlipidemia Father    • Colon polyps Father    • Vision loss Father         See above   • Arthritis Father    • Depression Daughter    • Learning disabilities Daughter         ADHD   • Vision loss Daughter         \" \"   • Diabetes Maternal Aunt         Pancreatic Insulinoma removed   • Thyroid disease Maternal Aunt    • Vision loss Maternal Aunt         \"  \"   • Learning disabilities Son         ADHD, SPD   • Malig Hyperthermia Neg Hx          SOCIAL HISTORY  Social History     Socioeconomic History   • Marital status:    Tobacco Use   • Smoking status: Never Smoker   • Smokeless tobacco: Never Used   Vaping Use   • Vaping Use: Never used   Substance and Sexual Activity   • Alcohol use: Not Currently     Alcohol/week: 0.0 standard drinks     Comment: Socially on holidays   • Drug use: No   • Sexual activity: Not Currently     Partners: Male     Birth control/protection: None         ALLERGIES  Budesonide-formoterol fumarate, Oxycodone-acetaminophen, and Pregabalin        REVIEW OF SYSTEMS  All systems reviewed and negative except for those discussed in HPI.       PHYSICAL EXAM    I have reviewed the triage vital signs and nursing notes.    ED Triage Vitals [10/03/22 1119] "   Temp Heart Rate Resp BP SpO2   98.9 °F (37.2 °C) 82 18 -- 95 %      Temp src Heart Rate Source Patient Position BP Location FiO2 (%)   Tympanic Monitor -- -- --       Physical Exam  GENERAL: Alert, oriented, not distressed  HENT: head atraumatic, no nuchal rigidity  EYES: no scleral icterus, EOMI  CV: regular rhythm, regular rate, no murmur  RESPIRATORY: normal effort, CTA  ABDOMEN: soft, nontender  MUSCULOSKELETAL: no deformity, FROM, no calf swelling or tenderness  NEURO: alert, normal cerebellar function, 5/5 strength in all extremities  SKIN: warm, dry        LAB RESULTS  Recent Results (from the past 24 hour(s))   ECG 12 Lead    Collection Time: 10/03/22 11:24 AM   Result Value Ref Range    QT Interval 428 ms   Comprehensive Metabolic Panel    Collection Time: 10/03/22 12:57 PM    Specimen: Blood   Result Value Ref Range    Glucose 90 65 - 99 mg/dL    BUN 13 6 - 20 mg/dL    Creatinine 0.73 0.57 - 1.00 mg/dL    Sodium 138 136 - 145 mmol/L    Potassium 4.3 3.5 - 5.2 mmol/L    Chloride 101 98 - 107 mmol/L    CO2 26.2 22.0 - 29.0 mmol/L    Calcium 9.9 8.6 - 10.5 mg/dL    Total Protein 7.1 6.0 - 8.5 g/dL    Albumin 4.30 3.50 - 5.20 g/dL    ALT (SGPT) 10 1 - 33 U/L    AST (SGOT) 14 1 - 32 U/L    Alkaline Phosphatase 99 39 - 117 U/L    Total Bilirubin 0.3 0.0 - 1.2 mg/dL    Globulin 2.8 gm/dL    A/G Ratio 1.5 g/dL    BUN/Creatinine Ratio 17.8 7.0 - 25.0    Anion Gap 10.8 5.0 - 15.0 mmol/L    eGFR 96.7 >60.0 mL/min/1.73   Troponin    Collection Time: 10/03/22 12:57 PM    Specimen: Blood   Result Value Ref Range    Troponin T <0.010 0.000 - 0.030 ng/mL   CBC Auto Differential    Collection Time: 10/03/22 12:57 PM    Specimen: Blood   Result Value Ref Range    WBC 4.71 3.40 - 10.80 10*3/mm3    RBC 4.53 3.77 - 5.28 10*6/mm3    Hemoglobin 12.5 12.0 - 15.9 g/dL    Hematocrit 38.3 34.0 - 46.6 %    MCV 84.5 79.0 - 97.0 fL    MCH 27.6 26.6 - 33.0 pg    MCHC 32.6 31.5 - 35.7 g/dL    RDW 13.4 12.3 - 15.4 %    RDW-SD 41.8 37.0  - 54.0 fl    MPV 8.7 6.0 - 12.0 fL    Platelets 275 140 - 450 10*3/mm3    Neutrophil % 61.4 42.7 - 76.0 %    Lymphocyte % 29.1 19.6 - 45.3 %    Monocyte % 6.4 5.0 - 12.0 %    Eosinophil % 2.3 0.3 - 6.2 %    Basophil % 0.6 0.0 - 1.5 %    Immature Grans % 0.2 0.0 - 0.5 %    Neutrophils, Absolute 2.89 1.70 - 7.00 10*3/mm3    Lymphocytes, Absolute 1.37 0.70 - 3.10 10*3/mm3    Monocytes, Absolute 0.30 0.10 - 0.90 10*3/mm3    Eosinophils, Absolute 0.11 0.00 - 0.40 10*3/mm3    Basophils, Absolute 0.03 0.00 - 0.20 10*3/mm3    Immature Grans, Absolute 0.01 0.00 - 0.05 10*3/mm3    nRBC 0.0 0.0 - 0.2 /100 WBC       Ordered the above labs and independently reviewed the results.        RADIOLOGY  XR Chest 1 View    Result Date: 10/3/2022  XR CHEST 1 VW-  HISTORY: Female who is 56 years-old,  chest pain  TECHNIQUE: Frontal view of the chest  COMPARISON: 5/3/2021  FINDINGS: Heart, mediastinum and pulmonary vasculature are unremarkable. No focal pulmonary consolidation, pleural effusion, or pneumothorax. No acute osseous process.      No evidence for acute pulmonary process. Follow-up as clinical indications persist.  This report was finalized on 10/3/2022 12:34 PM by Dr. Cole Le M.D.        I ordered the above noted radiological studies. Reviewed by me and discussed with radiologist.  See dictation for official radiology interpretation.      MEDICATIONS GIVEN IN ER    Medications   sodium chloride 0.9 % flush 10 mL (has no administration in time range)         PROGRESS, DATA ANALYSIS, CONSULTS, AND MEDICAL DECISION MAKING    All labs have been independently reviewed by me.  All radiology studies have been reviewed by me and discussed with radiologist dictating the report.   EKG's independently viewed and interpreted by me.  Discussion below represents my analysis of pertinent findings related to patient's condition, differential diagnosis, treatment plan and final disposition.    I have discussed case with Dr. Bella,  emergency room physician.  He has performed his own bedside examination and agrees with treatment plan.    ED Course as of 10/03/22 1543   Mon Oct 03, 2022   1215 Patient presents with a now resolved episode of right-sided chest wall pain.  Differential diagnoses include but not limited to ACS, PE, aortic dissection, esophagitis. [EE]   1221 EKG interpreted myself.  Time 1124.  Sinus rhythm, 61 bpm.  Normal P/ANNIE.  QRS normal normal axis.  No significant ST abnormalities.  EKG similar to prior EKG from 5/3/2021. [EE]   1302 Chest x-ray interpreted myself shows no acute infiltrate. [EE]   1354 Troponin T: <0.010 [EE]   1354 Hemoglobin: 12.5 [EE]   1408 HEART Score 2: + 1 age, +1 RFs [EE]   1422 Updated patient on work-up.  She is overall low risk for ACS.  She remains asymptomatic here.  We will discharge and have her follow-up with cardiology outpatient.  She is in agreement with treatment plan. [EE]      ED Course User Index  [EE] Chris Silveira PA       AS OF 15:43 EDT VITALS:    BP - 120/75  HR - 58  TEMP - 98.9 °F (37.2 °C) (Tympanic)  O2 SATS - 97%        DIAGNOSIS  Final diagnoses:   Atypical chest pain   Hypercholesteremia         DISPOSITION  Discharged      Dictated utilizing Dragon dictation     Chris Silveira PA  10/03/22 1544

## 2022-10-03 NOTE — ED PROVIDER NOTES
MD ATTESTATION NOTE    The MARY and I have discussed this patient's history, physical exam, and treatment plan.  I have reviewed the documentation and personally had a face to face interaction with the patient. I affirm the documentation and agree with the treatment and plan.  The attached note describes my personal findings.    I provided a substantive portion of the care of this patient. I personally performed the physical exam, in its entirety.    History  The 56-year-old female presents after episode of chest pain lasting about 10 minutes on Saturday while driving.  Pain was fairly severe in the right side of the chest but is now gone.    Physical Exam  Vital Signs reviewed  GENERAL: Alert female no obvious distress.  Triage vitals reviewed  HENT: nares patent  EYES: no scleral icterus  CV: regular rhythm, regular rate  RESPIRATORY: normal effort, clear to auscultation bilaterally  ABDOMEN: soft  MUSCULOSKELETAL: no deformity  NEURO: Strength sensation and coordination are grossly intact.  Speech and mentation are unremarkable  SKIN: warm, dry      Disposition  I discussed treatment and evaluation of this patient with GLENYS Silveira.  It is somewhat of an unusual story.  Symptoms are quite atypical for acute coronary syndrome but will rule out MI with troponin which may well still be elevated as it is less than 3 days out.         Nicolas Bella MD  10/03/22 [id number]

## 2022-10-19 ENCOUNTER — OFFICE VISIT (OUTPATIENT)
Dept: CARDIOLOGY | Facility: CLINIC | Age: 57
End: 2022-10-19

## 2022-10-19 VITALS
OXYGEN SATURATION: 99 % | WEIGHT: 192.8 LBS | SYSTOLIC BLOOD PRESSURE: 104 MMHG | DIASTOLIC BLOOD PRESSURE: 68 MMHG | BODY MASS INDEX: 32.91 KG/M2 | HEIGHT: 64 IN | HEART RATE: 59 BPM

## 2022-10-19 DIAGNOSIS — R07.89 CHEST PAIN, ATYPICAL: Primary | ICD-10-CM

## 2022-10-19 PROCEDURE — 99204 OFFICE O/P NEW MOD 45 MIN: CPT | Performed by: INTERNAL MEDICINE

## 2022-10-19 NOTE — PROGRESS NOTES
"      CARDIOLOGY    Ady Krishna MD    ENCOUNTER DATE:  10/19/2022    Machelle Ortega / 57 y.o. / female        CHIEF COMPLAINT / REASON FOR OFFICE VISIT     Chest pain  Syncope    HISTORY OF PRESENT ILLNESS       HPI  Machelle Ortega is a 57 y.o. female who presents today for consultation.  Patient said about 2 to 3 weeks ago she was driving when she had a sudden onset of discomfort on the right side of her chest.  Patient said she felt like it was indigestion but it was just a little bit different and very intense.  She pulled over to the road said suddenly her mouth started watering then she started seeing black spots and then she thinks she passed out but she does not know for how long.  Patient has not had any further episodes since then and actually has been feeling fairly well.  She has had a history of syncopal episodes in the past.  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  Patient also said she had received her 2 vaccinations a long time ago but did develop COVID about a month or 2 ago.  She had pretty minimal symptoms but a lot of his episodes occurred right after that.  She also has a history of sleep apnea she has not been utilizing her sleep machine.  She did hear about the recall and stopped using it although it does not sound like she want to use it anyway.  She says that ever since the COVID she is also been tripping a lot and feels off balance.  She is also had elevated cholesterol for many many years.      The following portions of the patient's history were reviewed and updated as appropriate: allergies, current medications, past family history, past medical history, past social history, past surgical history and problem list.      VITAL SIGNS     Visit Vitals  /68 (BP Location: Left arm, Patient Position: Sitting, Cuff Size: Adult)   Pulse 59   Ht 162.6 cm (64.02\")   Wt 87.5 kg (192 lb 12.8 oz)   LMP  (LMP Unknown) "   SpO2 99%   BMI 33.08 kg/m²         Wt Readings from Last 3 Encounters:   10/19/22 87.5 kg (192 lb 12.8 oz)   02/10/22 87 kg (191 lb 12.8 oz)   12/01/21 87.2 kg (192 lb 3.2 oz)     Body mass index is 33.08 kg/m².      REVIEW OF SYSTEMS   ROS        PHYSICAL EXAMINATION     Vitals reviewed.   Constitutional:       Appearance: Healthy appearance.   Pulmonary:      Effort: Pulmonary effort is normal.      Breath sounds: Normal breath sounds.   Cardiovascular:      Normal rate. Regular rhythm. Normal S1. Normal S2.      Murmurs: There is no murmur.      No gallop. No click. No rub.   Pulses:     Intact distal pulses.   Edema:     Peripheral edema absent.   Neurological:      Mental Status: Alert and oriented to person, place and time.           REVIEWED DATA     Procedures    Cardiac Procedures:  1.     Lipid Panel    Lipid Panel 12/1/21   Total Cholesterol 214 (A)   Triglycerides 241 (A)   HDL Cholesterol 48   VLDL Cholesterol 42 (A)   LDL Cholesterol  124 (A)   (A) Abnormal value                ASSESSMENT & PLAN      Diagnosis Plan   1. Chest pain, atypical  Treadmill Stress Test    Adult Transthoracic Echo Complete W/ Cont if Necessary Per Protocol    CT Cardiac Calcium Score Without Dye            SUMMARY/DISCUSSION  1. Atypical chest pain that subsequently led to a syncopal episode.  I think she had chest discomfort that led to a vasovagal episode.  However she does have some cardiac risk factors from her work her up for an echocardiogram as well as an ordinary treadmill.  2. COVID-19.  Some of the symptoms could easily be secondary to COVID-19 infection we have seen quite a bit of fluctuations of people's blood pressures as well some of the symptoms.  3. Syncope at this point since she is doing well I think it was vasovagal I did not place a monitor at this time.  If she has any more issues I would consider 1.  4. Hyperlipidemia.  She said that she was unable to tolerate a statin in the past.  Awaiting a calcium  score to see how high her calcium score is.  If she does have a very high calcium scoring they were going to have to be very aggressive treating her hyperlipidemia.  5. Sleep apnea I really encouraged her to do her research on the current sleep machines there is concerns that it may not been as bad as initially thought it was more associated with the so clean device.        MEDICATIONS         Discharge Medications          Accurate as of October 19, 2022  1:13 PM. If you have any questions, ask your nurse or doctor.            Continue These Medications      Instructions Start Date   albuterol sulfate  (90 Base) MCG/ACT inhaler  Commonly known as: PROVENTIL HFA;VENTOLIN HFA;PROAIR HFA   2 puffs, Inhalation, Every 4 Hours PRN      albuterol 1.25 MG/3ML nebulizer solution  Commonly known as: ACCUNEB   1.25 mg, Nebulization, Every 6 Hours PRN      buPROPion  MG 24 hr tablet  Commonly known as: WELLBUTRIN XL   150 mg, Oral, Every Morning      cholecalciferol 25 MCG (1000 UT) tablet  Commonly known as: VITAMIN D3   1,000 Units, Oral, Daily      cyclobenzaprine 10 MG tablet  Commonly known as: FLEXERIL   10 mg, Oral, 2 Times Daily PRN      diclofenac-miSOPROStol 50-0.2 MG EC tablet  Commonly known as: ARTHROTEC 50   1 tablet, Oral, 2 Times Daily      FLUoxetine 20 MG capsule  Commonly known as: PROzac   60 mg, Oral, Nightly      LORazepam 0.5 MG tablet  Commonly known as: ATIVAN   TAKE 1 TABLET BY MOUTH EVERY 8 HOURS AS NEEDED FOR ANXIETY      pantoprazole 40 MG EC tablet  Commonly known as: PROTONIX   40 mg, Oral, Daily      solifenacin 10 MG tablet  Commonly known as: VESICARE   10 mg, Oral, Daily      traZODone 100 MG tablet  Commonly known as: DESYREL   TAKE 2 TO 3 TABLET BY MOUTH AT BEDTIME                 **Dragon Disclaimer:   Much of this encounter note is an electronic transcription/translation of spoken language to printed text. The electronic translation of spoken language may permit erroneous, or  at times, nonsensical words or phrases to be inadvertently transcribed. Although I have reviewed the note for such errors, some may still exist.

## 2022-11-02 ENCOUNTER — HOSPITAL ENCOUNTER (OUTPATIENT)
Dept: CARDIOLOGY | Facility: HOSPITAL | Age: 57
Discharge: HOME | End: 2022-11-02

## 2022-11-02 VITALS
SYSTOLIC BLOOD PRESSURE: 131 MMHG | HEART RATE: 76 BPM | HEIGHT: 64 IN | DIASTOLIC BLOOD PRESSURE: 84 MMHG | WEIGHT: 192 LBS | BODY MASS INDEX: 32.78 KG/M2 | OXYGEN SATURATION: 98 %

## 2022-11-02 DIAGNOSIS — R07.89 CHEST PAIN, ATYPICAL: Primary | ICD-10-CM

## 2022-11-02 DIAGNOSIS — R07.89 CHEST PAIN, ATYPICAL: ICD-10-CM

## 2022-11-02 LAB
AORTIC ARCH: 2.5 CM
ASCENDING AORTA: 3.1 CM
BH CV ECHO MEAS - ACS: 2.13 CM
BH CV ECHO MEAS - AO MAX PG: 7.8 MMHG
BH CV ECHO MEAS - AO MEAN PG: 4.6 MMHG
BH CV ECHO MEAS - AO ROOT DIAM: 3.3 CM
BH CV ECHO MEAS - AO V2 MAX: 140 CM/SEC
BH CV ECHO MEAS - AO V2 VTI: 30.5 CM
BH CV ECHO MEAS - AVA(I,D): 2.5 CM2
BH CV ECHO MEAS - EDV(CUBED): 99.6 ML
BH CV ECHO MEAS - EDV(MOD-SP2): 49 ML
BH CV ECHO MEAS - EDV(MOD-SP4): 91 ML
BH CV ECHO MEAS - EF(MOD-BP): 65.3 %
BH CV ECHO MEAS - EF(MOD-SP2): 53.1 %
BH CV ECHO MEAS - EF(MOD-SP4): 72.5 %
BH CV ECHO MEAS - ESV(CUBED): 18.7 ML
BH CV ECHO MEAS - ESV(MOD-SP2): 23 ML
BH CV ECHO MEAS - ESV(MOD-SP4): 25 ML
BH CV ECHO MEAS - FS: 42.7 %
BH CV ECHO MEAS - IVS/LVPW: 0.83 CM
BH CV ECHO MEAS - IVSD: 0.98 CM
BH CV ECHO MEAS - LAT PEAK E' VEL: 8.1 CM/SEC
BH CV ECHO MEAS - LV DIASTOLIC VOL/BSA (35-75): 47.3 CM2
BH CV ECHO MEAS - LV MASS(C)D: 178.6 GRAMS
BH CV ECHO MEAS - LV MAX PG: 4.7 MMHG
BH CV ECHO MEAS - LV MEAN PG: 3 MMHG
BH CV ECHO MEAS - LV SYSTOLIC VOL/BSA (12-30): 13 CM2
BH CV ECHO MEAS - LV V1 MAX: 108.9 CM/SEC
BH CV ECHO MEAS - LV V1 VTI: 22.2 CM
BH CV ECHO MEAS - LVIDD: 4.6 CM
BH CV ECHO MEAS - LVIDS: 2.7 CM
BH CV ECHO MEAS - LVOT AREA: 3.5 CM2
BH CV ECHO MEAS - LVOT DIAM: 2.1 CM
BH CV ECHO MEAS - LVPWD: 1.18 CM
BH CV ECHO MEAS - MED PEAK E' VEL: 8.7 CM/SEC
BH CV ECHO MEAS - MV A DUR: 0.12 SEC
BH CV ECHO MEAS - MV A MAX VEL: 63.6 CM/SEC
BH CV ECHO MEAS - MV DEC SLOPE: 465.1 CM/SEC2
BH CV ECHO MEAS - MV DEC TIME: 0.2 MSEC
BH CV ECHO MEAS - MV E MAX VEL: 73.7 CM/SEC
BH CV ECHO MEAS - MV E/A: 1.16
BH CV ECHO MEAS - MV MAX PG: 3.1 MMHG
BH CV ECHO MEAS - MV MEAN PG: 1.21 MMHG
BH CV ECHO MEAS - MV P1/2T: 56.7 MSEC
BH CV ECHO MEAS - MV V2 VTI: 35.1 CM
BH CV ECHO MEAS - MVA(P1/2T): 3.9 CM2
BH CV ECHO MEAS - MVA(VTI): 2.19 CM2
BH CV ECHO MEAS - PA ACC TIME: 0.08 SEC
BH CV ECHO MEAS - PA PR(ACCEL): 41 MMHG
BH CV ECHO MEAS - PA V2 MAX: 118.1 CM/SEC
BH CV ECHO MEAS - PULM A REVS DUR: 0.12 SEC
BH CV ECHO MEAS - PULM A REVS VEL: 30.2 CM/SEC
BH CV ECHO MEAS - PULM DIAS VEL: 39.1 CM/SEC
BH CV ECHO MEAS - PULM S/D: 1.49
BH CV ECHO MEAS - PULM SYS VEL: 58.3 CM/SEC
BH CV ECHO MEAS - QP/QS: 0.74
BH CV ECHO MEAS - RAP SYSTOLE: 3 MMHG
BH CV ECHO MEAS - RV MAX PG: 2.8 MMHG
BH CV ECHO MEAS - RV V1 MAX: 83.2 CM/SEC
BH CV ECHO MEAS - RV V1 VTI: 18.9 CM
BH CV ECHO MEAS - RVOT DIAM: 1.95 CM
BH CV ECHO MEAS - RVSP: 23.6 MMHG
BH CV ECHO MEAS - SI(MOD-SP2): 13.5 ML/M2
BH CV ECHO MEAS - SI(MOD-SP4): 34.3 ML/M2
BH CV ECHO MEAS - SUP REN AO DIAM: 1.8 CM
BH CV ECHO MEAS - SV(LVOT): 76.9 ML
BH CV ECHO MEAS - SV(MOD-SP2): 26 ML
BH CV ECHO MEAS - SV(MOD-SP4): 66 ML
BH CV ECHO MEAS - SV(RVOT): 56.5 ML
BH CV ECHO MEAS - TAPSE (>1.6): 2.6 CM
BH CV ECHO MEAS - TR MAX PG: 20.6 MMHG
BH CV ECHO MEAS - TR MAX VEL: 226.7 CM/SEC
BH CV ECHO MEASUREMENTS AVERAGE E/E' RATIO: 8.77
BH CV STRESS BP STAGE 1: NORMAL
BH CV STRESS BP STAGE 2: NORMAL
BH CV STRESS DURATION MIN STAGE 1: 3
BH CV STRESS DURATION MIN STAGE 2: 3
BH CV STRESS DURATION MIN STAGE 3: 0
BH CV STRESS DURATION SEC STAGE 1: 0
BH CV STRESS DURATION SEC STAGE 2: 0
BH CV STRESS DURATION SEC STAGE 3: 5
BH CV STRESS GRADE STAGE 1: 10
BH CV STRESS GRADE STAGE 2: 12
BH CV STRESS GRADE STAGE 3: 14
BH CV STRESS HR STAGE 1: 107
BH CV STRESS HR STAGE 2: 132
BH CV STRESS HR STAGE 3: 132
BH CV STRESS METS STAGE 1: 5
BH CV STRESS METS STAGE 2: 7.5
BH CV STRESS METS STAGE 3: 8
BH CV STRESS PROTOCOL 1: NORMAL
BH CV STRESS RECOVERY BP: NORMAL MMHG
BH CV STRESS RECOVERY HR: 81 BPM
BH CV STRESS SPEED STAGE 1: 1.7
BH CV STRESS SPEED STAGE 2: 2.5
BH CV STRESS SPEED STAGE 3: 3.4
BH CV STRESS STAGE 1: 1
BH CV STRESS STAGE 2: 2
BH CV STRESS STAGE 3: 3
BH CV XLRA - RV BASE: 2.8 CM
BH CV XLRA - RV LENGTH: 7 CM
BH CV XLRA - RV MID: 3.1 CM
BH CV XLRA - TDI S': 16.2 CM/SEC
LEFT ATRIUM VOLUME INDEX: 26.8 ML/M2
MAXIMAL PREDICTED HEART RATE: 163 BPM
MAXIMAL PREDICTED HEART RATE: 163 BPM
PERCENT MAX PREDICTED HR: 80.98 %
SINUS: 3.2 CM
STJ: 2.9 CM
STRESS BASELINE BP: NORMAL MMHG
STRESS BASELINE HR: 75 BPM
STRESS PERCENT HR: 95 %
STRESS POST ESTIMATED WORKLOAD: 8 METS
STRESS POST EXERCISE DUR MIN: 6 MIN
STRESS POST EXERCISE DUR SEC: 5 SEC
STRESS POST PEAK BP: NORMAL MMHG
STRESS POST PEAK HR: 132 BPM
STRESS TARGET HR: 139 BPM
STRESS TARGET HR: 139 BPM

## 2022-11-02 PROCEDURE — 93306 TTE W/DOPPLER COMPLETE: CPT | Performed by: INTERNAL MEDICINE

## 2022-11-02 PROCEDURE — 93016 CV STRESS TEST SUPVJ ONLY: CPT | Performed by: INTERNAL MEDICINE

## 2022-11-02 PROCEDURE — 93018 CV STRESS TEST I&R ONLY: CPT | Performed by: INTERNAL MEDICINE

## 2022-11-02 PROCEDURE — 93306 TTE W/DOPPLER COMPLETE: CPT

## 2022-11-02 PROCEDURE — 93017 CV STRESS TEST TRACING ONLY: CPT

## 2022-11-02 NOTE — PROGRESS NOTES
Reviewed echocardiogram results with patient.  These are normal.  Unfortunately patient did not achieve maximum predicted heart rate for her treadmill stress test so the stress test is indeterminate.  We will need to proceed with nonexercising nuclear stress test.  She is agreeable.  Test was explained to the patient.  Scheduling can you please make arrangements?

## 2023-03-14 ENCOUNTER — OFFICE VISIT (OUTPATIENT)
Dept: INTERNAL MEDICINE | Facility: CLINIC | Age: 58
End: 2023-03-14
Payer: MEDICARE

## 2023-03-14 VITALS
SYSTOLIC BLOOD PRESSURE: 100 MMHG | HEART RATE: 79 BPM | BODY MASS INDEX: 34.01 KG/M2 | WEIGHT: 199.2 LBS | OXYGEN SATURATION: 96 % | DIASTOLIC BLOOD PRESSURE: 68 MMHG | HEIGHT: 64 IN | TEMPERATURE: 97.8 F

## 2023-03-14 DIAGNOSIS — F51.01 PRIMARY INSOMNIA: ICD-10-CM

## 2023-03-14 DIAGNOSIS — N39.46 MIXED STRESS AND URGE URINARY INCONTINENCE: ICD-10-CM

## 2023-03-14 DIAGNOSIS — K21.9 GASTROESOPHAGEAL REFLUX DISEASE WITHOUT ESOPHAGITIS: Chronic | ICD-10-CM

## 2023-03-14 DIAGNOSIS — Z00.00 MEDICARE ANNUAL WELLNESS VISIT, SUBSEQUENT: Primary | ICD-10-CM

## 2023-03-14 DIAGNOSIS — J30.9 ALLERGIC SINUSITIS: ICD-10-CM

## 2023-03-14 DIAGNOSIS — M25.50 POLYARTHRALGIA: ICD-10-CM

## 2023-03-14 DIAGNOSIS — F41.9 ANXIETY: ICD-10-CM

## 2023-03-14 DIAGNOSIS — E78.00 PURE HYPERCHOLESTEROLEMIA: Chronic | ICD-10-CM

## 2023-03-14 RX ORDER — ESTRADIOL 0.1 MG/G
CREAM VAGINAL
COMMUNITY
Start: 2022-12-01 | End: 2023-03-14

## 2023-03-14 RX ORDER — FLUOXETINE HYDROCHLORIDE 20 MG/1
60 CAPSULE ORAL NIGHTLY
Qty: 270 CAPSULE | Refills: 1 | Status: SHIPPED | OUTPATIENT
Start: 2023-03-14

## 2023-03-14 RX ORDER — SOLIFENACIN SUCCINATE 10 MG/1
10 TABLET, FILM COATED ORAL DAILY
Qty: 90 TABLET | Refills: 1 | Status: SHIPPED | OUTPATIENT
Start: 2023-03-14

## 2023-03-14 RX ORDER — PANTOPRAZOLE SODIUM 40 MG/1
40 TABLET, DELAYED RELEASE ORAL DAILY
Qty: 90 TABLET | Refills: 1 | Status: SHIPPED | OUTPATIENT
Start: 2023-03-14

## 2023-03-14 RX ORDER — TRAZODONE HYDROCHLORIDE 100 MG/1
200 TABLET ORAL NIGHTLY
Qty: 180 TABLET | Refills: 1 | Status: SHIPPED | OUTPATIENT
Start: 2023-03-14

## 2023-03-14 RX ORDER — BUPROPION HYDROCHLORIDE 150 MG/1
150 TABLET ORAL EVERY MORNING
Qty: 90 TABLET | Refills: 1 | Status: SHIPPED | OUTPATIENT
Start: 2023-03-14

## 2023-03-14 RX ORDER — ALBUTEROL SULFATE 90 UG/1
2 AEROSOL, METERED RESPIRATORY (INHALATION) EVERY 4 HOURS PRN
Qty: 6.7 G | Refills: 1 | Status: SHIPPED | OUTPATIENT
Start: 2023-03-14

## 2023-03-15 LAB
ALBUMIN SERPL-MCNC: 3.8 G/DL (ref 3.5–5.2)
ALBUMIN/GLOB SERPL: 1.5 G/DL
ALP SERPL-CCNC: 96 U/L (ref 39–117)
ALT SERPL-CCNC: 16 U/L (ref 1–33)
AST SERPL-CCNC: 18 U/L (ref 1–32)
BASOPHILS # BLD AUTO: 0.03 10*3/MM3 (ref 0–0.2)
BASOPHILS NFR BLD AUTO: 0.7 % (ref 0–1.5)
BILIRUB SERPL-MCNC: <0.2 MG/DL (ref 0–1.2)
BUN SERPL-MCNC: 19 MG/DL (ref 6–20)
BUN/CREAT SERPL: 29.7 (ref 7–25)
CALCIUM SERPL-MCNC: 9.5 MG/DL (ref 8.6–10.5)
CHLORIDE SERPL-SCNC: 108 MMOL/L (ref 98–107)
CHOLEST SERPL-MCNC: 171 MG/DL (ref 0–200)
CO2 SERPL-SCNC: 22.3 MMOL/L (ref 22–29)
CREAT SERPL-MCNC: 0.64 MG/DL (ref 0.57–1)
CRP SERPL-MCNC: <0.3 MG/DL (ref 0–0.5)
EGFRCR SERPLBLD CKD-EPI 2021: 103.2 ML/MIN/1.73
EOSINOPHIL # BLD AUTO: 0.11 10*3/MM3 (ref 0–0.4)
EOSINOPHIL NFR BLD AUTO: 2.6 % (ref 0.3–6.2)
ERYTHROCYTE [DISTWIDTH] IN BLOOD BY AUTOMATED COUNT: 12.8 % (ref 12.3–15.4)
ERYTHROCYTE [SEDIMENTATION RATE] IN BLOOD BY WESTERGREN METHOD: 15 MM/HR (ref 0–30)
GLOBULIN SER CALC-MCNC: 2.5 GM/DL
GLUCOSE SERPL-MCNC: 91 MG/DL (ref 65–99)
HCT VFR BLD AUTO: 33.4 % (ref 34–46.6)
HDLC SERPL-MCNC: 37 MG/DL (ref 40–60)
HGB BLD-MCNC: 11.4 G/DL (ref 12–15.9)
IMM GRANULOCYTES # BLD AUTO: 0.01 10*3/MM3 (ref 0–0.05)
IMM GRANULOCYTES NFR BLD AUTO: 0.2 % (ref 0–0.5)
LDLC SERPL CALC-MCNC: 98 MG/DL (ref 0–100)
LYMPHOCYTES # BLD AUTO: 1.31 10*3/MM3 (ref 0.7–3.1)
LYMPHOCYTES NFR BLD AUTO: 30.8 % (ref 19.6–45.3)
MCH RBC QN AUTO: 29.1 PG (ref 26.6–33)
MCHC RBC AUTO-ENTMCNC: 34.1 G/DL (ref 31.5–35.7)
MCV RBC AUTO: 85.2 FL (ref 79–97)
MONOCYTES # BLD AUTO: 0.38 10*3/MM3 (ref 0.1–0.9)
MONOCYTES NFR BLD AUTO: 8.9 % (ref 5–12)
NEUTROPHILS # BLD AUTO: 2.41 10*3/MM3 (ref 1.7–7)
NEUTROPHILS NFR BLD AUTO: 56.8 % (ref 42.7–76)
NRBC BLD AUTO-RTO: 0 /100 WBC (ref 0–0.2)
PLATELET # BLD AUTO: 242 10*3/MM3 (ref 140–450)
POTASSIUM SERPL-SCNC: 4.4 MMOL/L (ref 3.5–5.2)
PROT SERPL-MCNC: 6.3 G/DL (ref 6–8.5)
RBC # BLD AUTO: 3.92 10*6/MM3 (ref 3.77–5.28)
SODIUM SERPL-SCNC: 142 MMOL/L (ref 136–145)
TRIGL SERPL-MCNC: 210 MG/DL (ref 0–150)
TSH SERPL DL<=0.005 MIU/L-ACNC: 1.85 UIU/ML (ref 0.27–4.2)
VLDLC SERPL CALC-MCNC: 36 MG/DL (ref 5–40)
WBC # BLD AUTO: 4.25 10*3/MM3 (ref 3.4–10.8)

## 2023-04-01 PROBLEM — E87.6 HYPOKALEMIA: Status: RESOLVED | Noted: 2020-01-15 | Resolved: 2023-04-01

## 2023-04-01 NOTE — PATIENT INSTRUCTIONS
Medicare Wellness  Personal Prevention Plan of Service     Date of Office Visit:    Encounter Provider:  GOMEZ Godwin  Place of Service:  [facility]  Patient Name: Machelle Ortega  :  1965    As part of the Medicare Wellness portion of your visit today, we are providing you with this personalized preventive plan of services (PPPS). This plan is based upon recommendations of the [place] Preventive Services Task Force (USPSTF) and the Advisory Committee on Immunization Practices (ACIP).    This lists the preventive care services that should be considered, and provides dates of when you are due. Items listed as completed are up-to-date and do not require any further intervention.    Health Maintenance   Topic Date Due    ZOSTER VACCINE (2 of 2) 2018    COVID-19 Vaccine (3 - Booster for Pfizer series) 2021    Pneumococcal Vaccine 0-64 (2 - PPSV23 if available, else PCV20) 2022    ANNUAL WELLNESS VISIT  2022    INFLUENZA VACCINE  2023    MAMMOGRAM  2024    TDAP/TD VACCINES (2 - Td or Tdap) 2024    LIPID PANEL  2024    PAP SMEAR  2024    DXA SCAN  2025    COLORECTAL CANCER SCREENING  2027    HEPATITIS C SCREENING  Completed       Orders Placed This Encounter   Procedures    Comprehensive Metabolic Panel     Order Specific Question:   Release to patient     Answer:   Routine Release     Order Specific Question:   [facility] Has the patient fasted?     Answer:   No    Lipid Panel     Order Specific Question:   [facility] Has the patient fasted?     Answer:   No    TSH     Order Specific Question:   Release to patient     Answer:   Routine Release     Order Specific Question:   [facility] Has the patient fasted?     Answer:   No    Sedimentation Rate     Order Specific Question:   Release to patient     Answer:   Routine Release     Order Specific Question:   [facility] Has the patient fasted?     Answer:   No     C-reactive Protein     Order Specific Question:   Release to patient     Answer:   Routine Release     Order Specific Question:   [facility] Has the patient fasted?     Answer:   No    CBC & Differential     Order Specific Question:   Manual Differential     Answer:   No     Order Specific Question:   [facility] Has the patient fasted?     Answer:   No       Return in about 6 months (around 9/14/2023).

## 2023-04-01 NOTE — ASSESSMENT & PLAN NOTE
She has a longstanding hx of anxiety which has been managed on Wellbutrin XL & Fluoxetine which she is tolerating well, continue to monitor.

## 2023-04-01 NOTE — PROGRESS NOTES
The ABCs of the Annual Wellness Visit  Subsequent Medicare Wellness Visit    Subjective    Machelle Ortega is a 57 y.o. female who presents for a Subsequent Medicare Wellness Visit.    The following portions of the patient's history were reviewed and   updated as appropriate: allergies, current medications, past family history, past medical history, past social history, past surgical history and problem list.    Compared to one year ago, the patient feels her physical   health is the same.    Compared to one year ago, the patient feels her mental   health is the same.    Recent Hospitalizations:  She was not admitted to the hospital during the last year.       Current Medical Providers:  Patient Care Team:  Ira Vila APRN as PCP - General (Internal Medicine)  Antionette Herndon MD as Consulting Physician (Obstetrics and Gynecology)  Andrew Hopkins III, MD as Surgeon (Thoracic Surgery)  Dayron Jarrett MD as Surgeon (Neurosurgery)  Vitaliy Schafer MD as Consulting Physician (Pulmonary Disease)  Mohit Natarajan MD as Surgeon (General Surgery)  Vicente Cheng MD as Consulting Physician (Pulmonary Disease)  Ranjit London MD as Consulting Physician (Pain Medicine)  Fatimah Manley MD as Consulting Physician (Orthopedic Surgery)  Neftali Nayak MD as Consulting Physician (Physical Medicine and Rehabilitation)    Outpatient Medications Prior to Visit   Medication Sig Dispense Refill   • albuterol (ACCUNEB) 1.25 MG/3ML nebulizer solution Take 3 mL by nebulization Every 6 (Six) Hours As Needed for Wheezing. 30 each 4   • cholecalciferol (VITAMIN D3) 25 MCG (1000 UT) tablet Take 1 tablet by mouth Daily.     • LORazepam (ATIVAN) 0.5 MG tablet TAKE 1 TABLET BY MOUTH EVERY 8 HOURS AS NEEDED FOR ANXIETY 30 tablet 0   • albuterol sulfate  (90 Base) MCG/ACT inhaler Inhale 2 puffs Every 4 (Four) Hours As Needed for Wheezing. 6.7 g 1   • buPROPion XL (WELLBUTRIN XL) 150 MG 24 hr  tablet Take 1 tablet by mouth Every Morning. 90 tablet 0   • FLUoxetine (PROzac) 20 MG capsule Take 3 capsules by mouth Every Night. 270 capsule 0   • pantoprazole (PROTONIX) 40 MG EC tablet Take 1 tablet by mouth Daily. 90 tablet 1   • solifenacin (VESICARE) 10 MG tablet Take 1 tablet by mouth Daily. 90 tablet 0   • traZODone (DESYREL) 100 MG tablet TAKE 2 TO 3 TABLET BY MOUTH AT BEDTIME 270 tablet 0   • cyclobenzaprine (FLEXERIL) 10 MG tablet Take 1 tablet by mouth 2 (Two) Times a Day As Needed for Muscle Spasms. (Patient not taking: Reported on 3/14/2023) 90 tablet 3   • diclofenac-miSOPROStol (ARTHROTEC 50) 50-0.2 MG EC tablet Take 1 tablet by mouth 2 (Two) Times a Day. (Patient not taking: Reported on 3/14/2023)     • estradiol (ESTRACE) 0.1 MG/GM vaginal cream APPLY ONE GRAM TWICE WEEKLY (Patient not taking: Reported on 3/14/2023)       No facility-administered medications prior to visit.       No opioid medication identified on active medication list. I have reviewed chart for other potential  high risk medication/s and harmful drug interactions in the elderly.          Aspirin is not on active medication list.  Aspirin use is not indicated based on review of current medical condition/s. Risk of harm outweighs potential benefits.  .    Patient Active Problem List   Diagnosis   • Ataxic gait   • Chest pain, pleuritic   • DDD (degenerative disc disease), cervical   • DDD (degenerative disc disease), lumbar   • Dry cough   • Low back pain   • Chronic nausea   • Loss of feeling or sensation   • Pain in thoracic spine   • Pleural cavity effusion   • Breath shortness   • Cervical spinal stenosis   • IBS (irritable bowel syndrome)   • GERD (gastroesophageal reflux disease)   • Multiple joint complaints   • Attention deficit disorder (ADD) without hyperactivity   • Fibromyalgia   • Fatigue   • Sleep apnea   • Hyperlipidemia   • History of benign schwannoma   • Other cervical disc degeneration, high cervical region  "  • Chronic pain   • Postlaminectomy syndrome, thoracic   • Diaphragmatic hernia without obstruction and without gangrene   • Lumbar radiculopathy   • History of spinal surgery   • Neuropathic pain   • Balance disorder   • Primary insomnia   • Frequent episodes of pneumonia   • Anxiety   • Frequent episodes of bronchitis   • Anemia   • Weakness of both legs   • Mixed stress and urge urinary incontinence   • Cervical radiculopathy   • History of benign spinal cord tumor   • Intradural extramedullary spinal tumor   • Spinal stenosis of lumbar region with neurogenic claudication   • Mild intermittent asthma   • Monopolar depression   • Chronic pain of both shoulders     Advance Care Planning  Advance Directive is not on file.  ACP discussion was held with the patient during this visit. Patient has an advance directive (not in EMR), copy requested.     Objective    Vitals:    03/14/23 1451   BP: 100/68   BP Location: Left arm   Patient Position: Sitting   Cuff Size: Adult   Pulse: 79   Temp: 97.8 °F (36.6 °C)   TempSrc: Temporal   SpO2: 96%   Weight: 90.4 kg (199 lb 3.2 oz)   Height: 162.6 cm (64\")   PainSc: 6  Comment: arms: 7 legs: 6   PainLoc: Generalized     Estimated body mass index is 34.19 kg/m² as calculated from the following:    Height as of this encounter: 162.6 cm (64\").    Weight as of this encounter: 90.4 kg (199 lb 3.2 oz).    BMI is >= 30 and <35. (Class 1 Obesity). The following options were offered after discussion;: exercise counseling/recommendations and nutrition counseling/recommendations      Does the patient have evidence of cognitive impairment? No    Lab Results   Component Value Date    CHLPL 171 03/14/2023    TRIG 210 (H) 03/14/2023    HDL 37 (L) 03/14/2023    LDL 98 03/14/2023    VLDL 36 03/14/2023        HEALTH RISK ASSESSMENT    Smoking Status:  Social History     Tobacco Use   Smoking Status Never   Smokeless Tobacco Never     Alcohol Consumption:  Social History     Substance and Sexual " Activity   Alcohol Use Not Currently   • Alcohol/week: 0.0 standard drinks    Comment: Socially on holidays     Fall Risk Screen:    KALEB Fall Risk Assessment was completed, and patient is at HIGH risk for falls. Assessment completed on:3/14/2023    Depression Screening:  PHQ-2/PHQ-9 Depression Screening 3/14/2023   Little Interest or Pleasure in Doing Things 0-->not at all   Feeling Down, Depressed or Hopeless 2-->more than half the days   Trouble Falling or Staying Asleep, or Sleeping Too Much 0-->not at all   Feeling Tired or Having Little Energy 0-->not at all   Poor Appetite or Overeating 0-->not at all   Feeling Bad about Yourself - or that You are a Failure or Have Let Yourself or Your Family Down 0-->not at all   Trouble Concentrating on Things, Such as Reading the Newspaper or Watching Television 2-->more than half the days   Moving or Speaking So Slowly that Other People Could Have Noticed? Or the Opposite - Being So Fidgety 0-->not at all   Thoughts that You Would be Better Off Dead or of Hurting Yourself in Some Way 0-->not at all   PHQ-9: Brief Depression Severity Measure Score 4   If You Checked Off Any Problems, How Difficult Have These Problems Made It For You to Do Your Work, Take Care of Things at Home, or Get Along with Other People? not difficult at all       Health Habits and Functional and Cognitive Screening:  Functional & Cognitive Status 3/14/2023   Do you have difficulty preparing food and eating? No   Do you have difficulty bathing yourself, getting dressed or grooming yourself? No   Do you have difficulty using the toilet? No   Do you have difficulty moving around from place to place? No   Do you have trouble with steps or getting out of a bed or a chair? Yes   Current Diet Well Balanced Diet   Dental Exam Up to date   Eye Exam Up to date   Exercise (times per week) 0 times per week   Current Exercises Include No Regular Exercise   Current Exercise Activities Include -   Do you need  help using the phone?  No   Are you deaf or do you have serious difficulty hearing?  No   Do you need help with transportation? No   Do you need help shopping? No   Do you need help preparing meals?  No   Do you need help with housework?  No   Do you need help with laundry? No   Do you need help taking your medications? No   Do you need help managing money? No   Do you ever drive or ride in a car without wearing a seat belt? No   Have you felt unusual stress, anger or loneliness in the last month? Yes   Who do you live with? Spouse   If you need help, do you have trouble finding someone available to you? No   Have you been bothered in the last four weeks by sexual problems? No   Do you have difficulty concentrating, remembering or making decisions? Yes       Age-appropriate Screening Schedule:  Refer to the list below for future screening recommendations based on patient's age, sex and/or medical conditions. Orders for these recommended tests are listed in the plan section. The patient has been provided with a written plan.    Health Maintenance   Topic Date Due   • ZOSTER VACCINE (2 of 2) 07/24/2018   • COVID-19 Vaccine (3 - Booster for Pfizer series) 06/03/2021   • Pneumococcal Vaccine 0-64 (2 - PPSV23 if available, else PCV20) 12/01/2022   • ANNUAL WELLNESS VISIT  12/01/2022   • INFLUENZA VACCINE  08/01/2023   • MAMMOGRAM  01/24/2024   • TDAP/TD VACCINES (2 - Td or Tdap) 02/06/2024   • LIPID PANEL  03/14/2024   • PAP SMEAR  06/01/2024   • DXA SCAN  01/24/2025   • COLORECTAL CANCER SCREENING  03/22/2027   • HEPATITIS C SCREENING  Completed                CMS Preventative Services Quick Reference  Risk Factors Identified During Encounter  Immunizations Discussed/Encouraged: Pneumococcal 23, Shingrix and COVID19  The above risks/problems have been discussed with the patient.  Pertinent information has been shared with the patient in the After Visit Summary.  An After Visit Summary and PPPS were made available to the  "patient.    Follow Up:   Next Medicare Wellness visit to be scheduled in 1 year.       Additional E&M Note during same encounter follows:  Patient has multiple medical problems which are significant and separately identifiable that require additional work above and beyond the Medicare Wellness Visit.      Chief Complaint  Medicare Wellness-subsequent    Subjective        HPI  Machelle Ortega is also being seen today for increased joint pain with hx of chronic back & neck pain, anxiety and GERD.     She c/o increased joint pain and stiffness over the past several months without paresthesias with a hx of cervical DDD and lumbar stenosis. She c/o pain in bilateral legs with generalized weakness. She was worked with pain mgmt for epidurals with mild relief. She denies edema, denies rashes.    She is followed by GYN with regular pap smears.    Objective   Vital Signs:  /68 (BP Location: Left arm, Patient Position: Sitting, Cuff Size: Adult)   Pulse 79   Temp 97.8 °F (36.6 °C) (Temporal)   Ht 162.6 cm (64\")   Wt 90.4 kg (199 lb 3.2 oz)   SpO2 96%   BMI 34.19 kg/m²     Physical Exam                    Assessment and Plan   Diagnoses and all orders for this visit:    1. Medicare annual wellness visit, subsequent (Primary)    2. Primary insomnia  Assessment & Plan:  Sx managed with Trazodone nightly, tolerating well    Orders:  -     traZODone (DESYREL) 100 MG tablet; Take 2 tablets by mouth Every Night.  Dispense: 180 tablet; Refill: 1    3. Mixed stress and urge urinary incontinence  Assessment & Plan:  Sx managed on Vesicare, denies dysuria.    Orders:  -     solifenacin (VESICARE) 10 MG tablet; Take 1 tablet by mouth Daily.  Dispense: 90 tablet; Refill: 1    4. Anxiety  Assessment & Plan:  She has a longstanding hx of anxiety which has been managed on Wellbutrin XL & Fluoxetine which she is tolerating well, continue to monitor.    Orders:  -     FLUoxetine (PROzac) 20 MG capsule; Take 3 capsules by mouth Every " Night.  Dispense: 270 capsule; Refill: 1  -     buPROPion XL (WELLBUTRIN XL) 150 MG 24 hr tablet; Take 1 tablet by mouth Every Morning.  Dispense: 90 tablet; Refill: 1    5. Allergic sinusitis  Comments:  notes intermittent bronchospasm, improved with Albuterol inh  Orders:  -     albuterol sulfate  (90 Base) MCG/ACT inhaler; Inhale 2 puffs Every 4 (Four) Hours As Needed for Wheezing.  Dispense: 6.7 g; Refill: 1    6. Pure hypercholesterolemia  Assessment & Plan:  She tries to follow a low-fat, low-cholesterol diet; recheck lipid panel.    Orders:  -     CBC & Differential  -     Comprehensive Metabolic Panel  -     Lipid Panel  -     TSH    7. Gastroesophageal reflux disease without esophagitis  Assessment & Plan:  She is currently managed on Protonix daily, denies abdominal pain.    Orders:  -     pantoprazole (PROTONIX) 40 MG EC tablet; Take 1 tablet by mouth Daily.  Dispense: 90 tablet; Refill: 1    8. Polyarthralgia  Comments:  She notes increased joint pain, check labs to further evalaute  Orders:  -     Sedimentation Rate  -     C-reactive Protein           Follow Up   Return in about 6 months (around 9/14/2023).  Patient was given instructions and counseling regarding her condition or for health maintenance advice. Please see specific information pulled into the AVS if appropriate.

## 2023-07-25 ENCOUNTER — TELEPHONE (OUTPATIENT)
Dept: INTERNAL MEDICINE | Facility: CLINIC | Age: 58
End: 2023-07-25
Payer: COMMERCIAL

## 2023-10-16 DIAGNOSIS — K44.9 DIAPHRAGMATIC HERNIA WITHOUT OBSTRUCTION AND WITHOUT GANGRENE: Primary | ICD-10-CM

## 2023-11-08 ENCOUNTER — TELEPHONE (OUTPATIENT)
Dept: NEUROSURGERY | Facility: CLINIC | Age: 58
End: 2023-11-08

## 2023-11-08 NOTE — TELEPHONE ENCOUNTER
Contacted patient had to [facility] to inform her we are having to cancel her appointment today due to an emergency surgery and we will be rescheduling soon.    H[facility] CAN READ

## 2023-11-13 ENCOUNTER — TELEPHONE (OUTPATIENT)
Dept: NEUROSURGERY | Facility: CLINIC | Age: 58
End: 2023-11-13

## 2023-11-14 ENCOUNTER — HOSPITAL ENCOUNTER (OUTPATIENT)
Dept: CT IMAGING | Facility: HOSPITAL | Age: 58
Discharge: HOME | End: 2023-11-14
Admitting: THORACIC SURGERY (CARDIOTHORACIC VASCULAR SURGERY)
Payer: MEDICARE

## 2023-11-14 DIAGNOSIS — K44.9 DIAPHRAGMATIC HERNIA WITHOUT OBSTRUCTION AND WITHOUT GANGRENE: ICD-10-CM

## 2023-11-14 PROCEDURE — 71250 CT THORAX DX C-: CPT

## 2023-11-16 ENCOUNTER — TELEPHONE (OUTPATIENT)
Dept: INTERNAL MEDICINE | Facility: CLINIC | Age: 58
End: 2023-11-16
Payer: COMMERCIAL

## 2023-11-16 NOTE — TELEPHONE ENCOUNTER
Lvm for patient to call back to schedule medicare wellness     Tremfya Counseling: I discussed with the patient the risks of guselkumab including but not limited to immunosuppression, serious infections, and drug reactions.  The patient understands that monitoring is required including a PPD at baseline and must alert us or the primary physician if symptoms of infection or other concerning signs are noted.

## 2023-11-20 ENCOUNTER — PATIENT ROUNDING (BHMG ONLY) (OUTPATIENT)
Dept: SURGERY | Facility: CLINIC | Age: 58
End: 2023-11-20
Payer: COMMERCIAL

## 2023-11-20 ENCOUNTER — OFFICE VISIT (OUTPATIENT)
Dept: SURGERY | Facility: CLINIC | Age: 58
End: 2023-11-20
Payer: MEDICARE

## 2023-11-20 VITALS
WEIGHT: 187 LBS | BODY MASS INDEX: 31.92 KG/M2 | HEIGHT: 64 IN | OXYGEN SATURATION: 96 % | DIASTOLIC BLOOD PRESSURE: 80 MMHG | SYSTOLIC BLOOD PRESSURE: 112 MMHG | HEART RATE: 64 BPM

## 2023-11-20 DIAGNOSIS — K44.9 DIAPHRAGMATIC HERNIA WITHOUT OBSTRUCTION AND WITHOUT GANGRENE: Primary | ICD-10-CM

## 2023-11-20 PROCEDURE — 1160F RVW MEDS BY RX/DR IN RCRD: CPT | Performed by: THORACIC SURGERY (CARDIOTHORACIC VASCULAR SURGERY)

## 2023-11-20 PROCEDURE — 99203 OFFICE O/P NEW LOW 30 MIN: CPT | Performed by: THORACIC SURGERY (CARDIOTHORACIC VASCULAR SURGERY)

## 2023-11-20 PROCEDURE — 1159F MED LIST DOCD IN RCRD: CPT | Performed by: THORACIC SURGERY (CARDIOTHORACIC VASCULAR SURGERY)

## 2023-11-20 NOTE — LETTER
December 7, 2023     GOMEZ Holley  [address]  [address] [address]    Patient: Machelle Ortega   YOB: 1965   Date of Visit: 11/20/2023     Dear GOMEZ Holley:       Thank you for referring Machelle Ortega to me for evaluation. Below are the relevant portions of my assessment and plan of care.    If you have questions, please do not hesitate to call me. I look forward to following Machelle along with you.         Sincerely,        Hailey Dunn MD        CC: No Recipients    Hailey Dunn MD  12/07/23 1359  Sign when Signing Visit  Chief Complaint  Diaphragmatic hernia    Subjective         Machelle Ortega presents to [facility] THORACIC SURGERY  History of Present Illness  Ms. Machelle Ortega is a pleasant 58-year-old lady who presents with concern about a right diaphragmatic hernia. She is status post repair of this in 2015 by my predecessor, Dr. Benjamin. She is accompanied by her , Vitaliy.    The patient reports in 2015 Dr. Benjamin performed surgery on her lung problems. She was having trouble breathing and went to Dr. Powell who told her that her lungs collapsed. She underwent testing, which included a bronchoscopy. Her symptoms continued to worsen so she was sent to [facility], [place], operated by [facility] where they drained her lungs, however the pleural effusion returned 2 days later. During surgery Dr. Benjamin discovered a diaphragmatic hernia. She was told it was congenital and it is unusual because it is not from a major accident where it opens. She notes he removed a wedge of her lung. A couple of years later after having a myelogram, her neurosurgeon, Dr. Jarrett found she had a hole in her diaphragm and her liver was growing through it. She went back and saw Dr. Benjamin who could not believe she had a protrusion only after a few years. She has not seen anyone since, and it has been multiple years, and she just wants to have it checked.  She is not having any issues.     She queries why the  "pleural effusion she had years ago would have returned after thoracentesis. She has had pneumonia a couple of times.     She has a history of schwannoma in her thoracic spine at T10-11, which was removed, but she reports it has recurred.     The patient denies ever smoking.     She states her mother has lung cancer.    Objective  Vital Signs:   /80 (BP Location: Left arm, Patient Position: Sitting, Cuff Size: Adult)   Pulse 64   Ht 162.6 cm (64\")   Wt 84.8 kg (187 lb)   SpO2 96%   BMI 32.10 kg/m²     Physical Exam  Vitals and nursing note reviewed.   Constitutional:       Appearance: She is well-developed.   HENT:      Head: Normocephalic and atraumatic.      Nose: Nose normal.   Eyes:      Conjunctiva/sclera: Conjunctivae normal.   Cardiovascular:      Rate and Rhythm: Normal rate.   Pulmonary:      Effort: Pulmonary effort is normal.   Abdominal:      Palpations: Abdomen is soft.   Musculoskeletal:      Cervical back: Neck supple.   Skin:     General: Skin is warm and dry.   Neurological:      Mental Status: She is alert and oriented to person, place, and time.   Psychiatric:         Behavior: Behavior normal.         Thought Content: Thought content normal.         Judgment: Judgment normal.          Result Review:          I have independently reviewed the CT of the chest performed on 11/14/2023 which demonstrates postoperative changes along the right hemidiaphragm with a stable 3 cm rounded density at the lung base with scarring in the right lung. No mediastinal or hilar lymphadenopathy. No new lung nodules. No pleural or pericardial effusion.             Assessment and Plan    Diagnoses and all orders for this visit:    1. Diaphragmatic hernia without obstruction and without gangrene (Primary)        Ms. Machelle Ortega is a pleasant 58-year-old lady with stable CT findings after her prior diaphragmatic hernia repair. She does have a small area of protrusion of her liver through her diaphragm associated " with this repair that is stable. I would not recommend any surgical procedure to fix this as it has been this way since 2017 and has not changed. Her pain is likely not related to this.    1. Diaphragmatic hernia with protrusion  - No treatment is warranted at this time. The patient will call with any question or concerns.     I spent 30 minutes caring for Machelle on this date of service. This time includes time spent by me in the following activities:preparing for the visit, reviewing tests, obtaining and/or reviewing a separately obtained history, performing a medically appropriate examination and/or evaluation , counseling and educating the patient/family/caregiver, ordering medications, tests, or procedures, documenting information in the medical record, and independently interpreting results and communicating that information with the patient/family/caregiver  Follow Up   No follow-ups on file.  Patient was given instructions and counseling regarding her condition or for health maintenance advice. Please see specific information pulled into the AVS if appropriate.       Transcribed from ambient dictation for Hailey Dunn MD by Sujey Ray.  11/20/23   11:02 EST    Patient or patient representative verbalized consent to the visit recording.  I have personally performed the services described in this document as transcribed by the above individual, and it is both accurate and complete.

## 2023-11-20 NOTE — PROGRESS NOTES
"Chief Complaint  Diaphragmatic hernia    Subjective          Machelle Ortega presents to [facility] THORACIC SURGERY  History of Present Illness  Ms. Machelle Ortega is a pleasant 58-year-old lady who presents with concern about a right diaphragmatic hernia. She is status post repair of this in 2015 by my predecessor, Dr. Benjamin. She is accompanied by her , Vitaliy.    The patient reports in 2015 Dr. Benjamin performed surgery on her lung problems. She was having trouble breathing and went to Dr. Powell who told her that her lungs collapsed. She underwent testing, which included a bronchoscopy. Her symptoms continued to worsen so she was sent to [facility] where they drained her lungs, however the pleural effusion returned 2 days later. During surgery Dr. Benjamin discovered a diaphragmatic hernia. She was told it was congenital and it is unusual because it is not from a major accident where it opens. She notes he removed a wedge of her lung. A couple of years later after having a myelogram, her neurosurgeon, Dr. Jarrett found she had a hole in her diaphragm and her liver was growing through it. She went back and saw Dr. Benjamin who could not believe she had a protrusion only after a few years. She has not seen anyone since, and it has been multiple years, and she just wants to have it checked.  She is not having any issues.     She queries why the pleural effusion she had years ago would have returned after thoracentesis. She has had pneumonia a couple of times.     She has a history of schwannoma in her thoracic spine at T10-11, which was removed, but she reports it has recurred.     The patient denies ever smoking.     She states her mother has lung cancer.    Objective   Vital Signs:   /80 (BP Location: Left arm, Patient Position: Sitting, Cuff Size: Adult)   Pulse 64   Ht 162.6 cm (64\")   Wt 84.8 kg (187 lb)   SpO2 96%   BMI 32.10 kg/m²     Physical Exam  Vitals and nursing note reviewed. "   Constitutional:       Appearance: She is well-developed.   HENT:      Head: Normocephalic and atraumatic.      Nose: Nose normal.   Eyes:      Conjunctiva/sclera: Conjunctivae normal.   Cardiovascular:      Rate and Rhythm: Normal rate.   Pulmonary:      Effort: Pulmonary effort is normal.   Abdominal:      Palpations: Abdomen is soft.   Musculoskeletal:      Cervical back: Neck supple.   Skin:     General: Skin is warm and dry.   Neurological:      Mental Status: She is alert and oriented to person, place, and time.   Psychiatric:         Behavior: Behavior normal.         Thought Content: Thought content normal.         Judgment: Judgment normal.          Result Review :          I have independently reviewed the CT of the chest performed on 11/14/2023 which demonstrates postoperative changes along the right hemidiaphragm with a stable 3 cm rounded density at the lung base with scarring in the right lung. No mediastinal or hilar lymphadenopathy. No new lung nodules. No pleural or pericardial effusion.             Assessment and Plan    Diagnoses and all orders for this visit:    1. Diaphragmatic hernia without obstruction and without gangrene (Primary)        Ms. Machelle Ortega is a pleasant 58-year-old lady with stable CT findings after her prior diaphragmatic hernia repair. She does have a small area of protrusion of her liver through her diaphragm associated with this repair that is stable. I would not recommend any surgical procedure to fix this as it has been this way since 2017 and has not changed. Her pain is likely not related to this.    1. Diaphragmatic hernia with protrusion  - No treatment is warranted at this time. The patient will call with any question or concerns.     I spent 30 minutes caring for Machelle on this date of service. This time includes time spent by me in the following activities:preparing for the visit, reviewing tests, obtaining and/or reviewing a separately obtained history, performing a  medically appropriate examination and/or evaluation , counseling and educating the patient/family/caregiver, ordering medications, tests, or procedures, documenting information in the medical record, and independently interpreting results and communicating that information with the patient/family/caregiver  Follow Up   No follow-ups on file.  Patient was given instructions and counseling regarding her condition or for health maintenance advice. Please see specific information pulled into the AVS if appropriate.       Transcribed from ambient dictation for Hailey Dunn MD by Sujey Ray.  11/20/23   11:02 EST    Patient or patient representative verbalized consent to the visit recording.  I have personally performed the services described in this document as transcribed by the above individual, and it is both accurate and complete.

## 2023-12-18 ENCOUNTER — OFFICE VISIT (OUTPATIENT)
Dept: NEUROSURGERY | Facility: CLINIC | Age: 58
End: 2023-12-18
Payer: COMMERCIAL

## 2023-12-18 VITALS — SYSTOLIC BLOOD PRESSURE: 110 MMHG | DIASTOLIC BLOOD PRESSURE: 80 MMHG | HEART RATE: 65 BPM | OXYGEN SATURATION: 96 %

## 2023-12-18 DIAGNOSIS — M54.50 CHRONIC BILATERAL LOW BACK PAIN WITHOUT SCIATICA: Primary | ICD-10-CM

## 2023-12-18 DIAGNOSIS — G62.9 PERIPHERAL NERVE DYSFUNCTION: ICD-10-CM

## 2023-12-18 DIAGNOSIS — G89.29 CHRONIC BILATERAL LOW BACK PAIN WITHOUT SCIATICA: Primary | ICD-10-CM

## 2023-12-18 NOTE — PROGRESS NOTES
Subjective   Patient ID: Machelle Ortega is a 58 y.o. female is here today for follow-up with stimulator not charging at all and back pain has increased.    History of Present Illness  She had C3-C6 ACCF 2.13.14 and T10-T11 laminectomy for resection of a schwannoma in 2006 by Dr Phelan.  She has a SCS that was placed in 2017   58-year-old female with chronic history of shoulder pain with history of C3-6 ACDF.  She has a known fracture of C6 screw.  She is here today for 1 year follow-up to check on her current status.    Today she reports somewhat worsening back pain.  She feels like her spinal stimulator is no longer working.  She has not called her [employer] rep to get more information.  She does not report any new upper or lower extremity weakness but she states that she has baseline left-sided weakness since her previous surgery for removal of thoracic schwannoma.  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  She reports peripheral nerve pain when she presses on this lipoma.      The following portions of the patient's history were reviewed and updated as appropriate: allergies, current medications, past family history, past medical history, past social history, past surgical history, and problem list.    Review of Systems   Gastrointestinal:  Positive for constipation.   Genitourinary:  Negative for difficulty urinating and enuresis.   Musculoskeletal:  Positive for back pain and gait problem.   Neurological:  Positive for weakness (Chronic at baseline) and numbness.   Psychiatric/Behavioral:  Negative for sleep disturbance.            Objective     Vitals:    12/18/23 1039   BP: 110/80   Pulse: 65   SpO2: 96%     There is no height or weight on file to calculate BMI.    Tobacco Use: Low Risk  (12/18/2023)    Patient History     Smoking Tobacco Use: Never     Smokeless Tobacco Use: Never     Passive Exposure:  Not on file          Physical Exam  Constitutional:       General: She is not in acute distress.     Appearance: Normal appearance. She is not ill-appearing, toxic-appearing or diaphoretic.   HENT:      Head: Normocephalic.      Nose: Nose normal.      Mouth/Throat:      Mouth: Mucous membranes are moist.      Pharynx: Oropharynx is clear.   Eyes:      Extraocular Movements: Extraocular movements intact.      Conjunctiva/sclera: Conjunctivae normal.   Cardiovascular:      Rate and Rhythm: Normal rate.   Pulmonary:      Effort: Pulmonary effort is normal.   Musculoskeletal:         General: Normal range of motion.      Cervical back: Normal range of motion.   Skin:     General: Skin is warm.             Comments: Has a small lipoma just proximal to the left knee.   Neurological:      Mental Status: She is alert and oriented to person, place, and time. Mental status is at baseline.      Gait: Gait is intact.      Deep Tendon Reflexes:      Reflex Scores:       Patellar reflexes are 3+ (Chronic at baseline) on the right side and 3+ (Chronic at baseline) on the left side.       Achilles reflexes are 2+ on the right side and 2+ on the left side.  Psychiatric:         Mood and Affect: Mood normal.         Speech: Speech normal.         Behavior: Behavior normal.         Thought Content: Thought content normal.         Judgment: Judgment normal.       Neurologic Exam     Mental Status   Oriented to person, place, and time.   Attention: normal. Concentration: normal.   Speech: speech is normal   Level of consciousness: alert  Knowledge: consistent with education.     Motor Exam   Overall muscle tone: normal  Right arm tone: normal  Left arm tone: normal  Right leg tone: normal  Left leg tone: normal    Strength   Right iliopsoas: 5/5  Left iliopsoas: 4/5  Right quadriceps: 5/5  Left quadriceps: 4/5  Right hamstrin/5  Right anterior tibial: 5/5  Left anterior tibial: 5/5  Right posterior tibial: 5/5  Left posterior  tibial: 5/5  Right gastroc: 5/5  Left gastroc: 4/5Left lower extremity strength is at baseline.     Sensory Exam   Light touch normal.     Gait, Coordination, and Reflexes     Gait  Gait: normal    Reflexes   Right patellar: 3+ (Chronic at baseline)  Left patellar: 3+ (Chronic at baseline)  Right achilles: 2+  Left achilles: 2+  Right ankle clonus: present (3 beats.  This is chronic)  Left ankle clonus: present (1 beat.  This is chronic)          Assessment & Plan   Independent Review of Radiographic Studies:      I personally reviewed the images from the following studies.    No new neuroimaging    Medical Decision Makin-year-old female with history of thoracic schwannoma with removal.  Was told that there is some residual schwannoma in the thoracic spine but at this time she does not want to undergo any imaging.  She will let us know in the new year if she would like to pursue imaging.  It would probably have to be with a myelogram since she cannot get an MRI.  She does report some new back pain but this is probably related to issues with her stimulator.  I asked her to contact her [employer] rep to see if they can resolve the issue.  If there is nothing that they can do, we will see her again in the office to discuss possible surgical intervention if needed.  She is also having issues with peripheral nerve pain related to possible lipoma just proximal to the left knee.  This appears to be affecting the peroneal nerve.  She does not want to try any gabapentin or Lyrica at this time but I will go ahead and order a left lower extremity EMG/NCV and give her some diclofenac cream upon request as this has helped her with this pain in the past.  We will follow-up after EMG/NCV complete.    Diagnoses and all orders for this visit:    1. Chronic bilateral low back pain without sciatica (Primary)    2. Peripheral nerve dysfunction  -     EMG & Nerve Conduction Test; Future    Other orders  -     Diclofenac Sodium  (VOLTAREN) 1 % gel gel; Apply 4 g topically to the appropriate area as directed 2 (Two) Times a Day As Needed (for RLE pain).  Dispense: 350 g; Refill: 1      No follow-ups on file.

## 2024-04-22 ENCOUNTER — TELEPHONE (OUTPATIENT)
Dept: NEUROSURGERY | Facility: CLINIC | Age: 59
End: 2024-04-22
Payer: COMMERCIAL

## 2024-04-30 ENCOUNTER — OFFICE VISIT (OUTPATIENT)
Dept: INTERNAL MEDICINE | Facility: CLINIC | Age: 59
End: 2024-04-30
Payer: MEDICARE

## 2024-04-30 VITALS
HEIGHT: 64 IN | WEIGHT: 185 LBS | BODY MASS INDEX: 31.58 KG/M2 | HEART RATE: 61 BPM | SYSTOLIC BLOOD PRESSURE: 110 MMHG | OXYGEN SATURATION: 98 % | DIASTOLIC BLOOD PRESSURE: 80 MMHG

## 2024-04-30 DIAGNOSIS — R41.3 MEMORY LOSS: ICD-10-CM

## 2024-04-30 DIAGNOSIS — E78.00 PURE HYPERCHOLESTEROLEMIA: Chronic | ICD-10-CM

## 2024-04-30 DIAGNOSIS — Z86.018 HISTORY OF BENIGN SCHWANNOMA: ICD-10-CM

## 2024-04-30 DIAGNOSIS — F41.9 ANXIETY: Chronic | ICD-10-CM

## 2024-04-30 DIAGNOSIS — J45.20 MILD INTERMITTENT ASTHMA, UNSPECIFIED WHETHER COMPLICATED: ICD-10-CM

## 2024-04-30 DIAGNOSIS — E66.09 CLASS 1 OBESITY DUE TO EXCESS CALORIES WITH SERIOUS COMORBIDITY AND BODY MASS INDEX (BMI) OF 31.0 TO 31.9 IN ADULT: ICD-10-CM

## 2024-04-30 DIAGNOSIS — H93.13 TINNITUS OF BOTH EARS: ICD-10-CM

## 2024-04-30 DIAGNOSIS — Z76.89 ENCOUNTER TO ESTABLISH CARE: Primary | ICD-10-CM

## 2024-04-30 DIAGNOSIS — F51.01 PRIMARY INSOMNIA: ICD-10-CM

## 2024-04-30 DIAGNOSIS — M79.2 NEUROPATHIC PAIN: ICD-10-CM

## 2024-04-30 DIAGNOSIS — G47.33 OBSTRUCTIVE SLEEP APNEA SYNDROME: ICD-10-CM

## 2024-04-30 DIAGNOSIS — F98.8 ATTENTION DEFICIT DISORDER (ADD) WITHOUT HYPERACTIVITY: ICD-10-CM

## 2024-04-30 DIAGNOSIS — K21.9 GASTROESOPHAGEAL REFLUX DISEASE WITHOUT ESOPHAGITIS: Chronic | ICD-10-CM

## 2024-04-30 DIAGNOSIS — F33.9 MONOPOLAR DEPRESSION: ICD-10-CM

## 2024-04-30 PROBLEM — E66.811 CLASS 1 OBESITY DUE TO EXCESS CALORIES WITH SERIOUS COMORBIDITY AND BODY MASS INDEX (BMI) OF 31.0 TO 31.9 IN ADULT: Status: ACTIVE | Noted: 2024-04-30

## 2024-04-30 PROCEDURE — 99215 OFFICE O/P EST HI 40 MIN: CPT | Performed by: NURSE PRACTITIONER

## 2024-04-30 PROCEDURE — 1160F RVW MEDS BY RX/DR IN RCRD: CPT | Performed by: NURSE PRACTITIONER

## 2024-04-30 PROCEDURE — 1170F FXNL STATUS ASSESSED: CPT | Performed by: NURSE PRACTITIONER

## 2024-04-30 PROCEDURE — 1159F MED LIST DOCD IN RCRD: CPT | Performed by: NURSE PRACTITIONER

## 2024-04-30 NOTE — PROGRESS NOTES
Chief Complaint  Rash    Subjective        Machelle Ortega presents to [facility]  History of Present Illness  This is a 57 y/o female presenting to office to establish care and for chronic health management.     Patient is currently  and disabled.     Reports she is following intermittent fasting and exercising regularly.     Denies tobacco use. Reports no alcohol use.     Follows with Dr. Herndon for gynecology needs.   Mammogram completed 2023    Reports anxiety and depression-- reports she also has a hx of ADHD; reports she was previously on adderall. Has been off of this since 2022. Reports she needs to establish with behavioral health.  Continues on prozac, trazodone, wellbutrin.   Denies any SI.     Hx thoracic schwannoma with removal-- following with Dr. Jarrett; working on scheduling EMG study.     Hx diaphragmatic hernia without obstruction-- following with Dr. Dunn; under surveillance. No current tx at this time.   Continues on PPI for GERD.     Following with Dr. Manley for b/l chronic shoulder pain; hx injection therapy.     Hx asthma-- uses albuterol as needed; reports occasionally using this; worse in fall.     Reports she has been dealing with b/l tinnitus over the past year; reports she has not found any resolve to this; reports her right ear is worse than her left ear.     Reports she has been struggling with memory loss-- reports worsening over past year; reports she has had changes in short and long memory. Reports she is forgetting to take her medications. Reports she has had a couple times of getting lost while driving-- reports her  typically drives. Reports she is able to handle her ADL's. Reports her son has given her concern about her memory loss. Reports her son will tell her she committed to different activities and she will forget this.       Objective   Vital Signs:  /80 (BP Location: Left arm, Patient Position: Sitting, Cuff  "Size: Adult)   Pulse 61   Ht 162.6 cm (64\")   Wt 83.9 kg (185 lb)   SpO2 98%   BMI 31.76 kg/m²   Estimated body mass index is 31.76 kg/m² as calculated from the following:    Height as of this encounter: 162.6 cm (64\").    Weight as of this encounter: 83.9 kg (185 lb).       BMI is >= 30 and <35. (Class 1 Obesity). The following options were offered after discussion;: exercise counseling/recommendations and nutrition counseling/recommendations      Physical Exam  Constitutional:       General: She is awake.      Appearance: Normal appearance. She is obese.   HENT:      Head: Normocephalic and atraumatic.      Right Ear: Hearing, tympanic membrane, ear canal and external ear normal.      Left Ear: Hearing, tympanic membrane, ear canal and external ear normal.      Nose: Nose normal. No congestion.      Mouth/Throat:      Lips: Pink.      Mouth: Mucous membranes are moist.      Pharynx: Oropharynx is clear.   Eyes:      Extraocular Movements: Extraocular movements intact.      Conjunctiva/sclera: Conjunctivae normal.      Pupils: Pupils are equal, round, and reactive to light.   Cardiovascular:      Rate and Rhythm: Normal rate and regular rhythm.      Pulses: Normal pulses.      Heart sounds: Normal heart sounds. No murmur heard.     No gallop.   Pulmonary:      Effort: Pulmonary effort is normal. No respiratory distress.      Breath sounds: Normal breath sounds. No stridor. No wheezing, rhonchi or rales.   Musculoskeletal:      Cervical back: Normal range of motion and neck supple.   Skin:     General: Skin is warm and dry.      Capillary Refill: Capillary refill takes less than 2 seconds.   Neurological:      General: No focal deficit present.      Mental Status: She is alert and oriented to person, place, and time. Mental status is at baseline.      Motor: Motor function is intact.      Coordination: Coordination is intact.      Gait: Gait is intact.      Deep Tendon Reflexes: Reflexes are normal and " symmetric.   Psychiatric:         Attention and Perception: Attention normal.         Mood and Affect: Mood normal.         Speech: Speech normal.         Behavior: Behavior normal. Behavior is cooperative.         Thought Content: Thought content normal.         Cognition and Memory: Cognition normal.         Judgment: Judgment normal.        Result Review :    The following data was reviewed by: GOMEZ Holley on 2024:    Tobacco Use: Low Risk  (2024)    Patient History     Smoking Tobacco Use: Never     Smokeless Tobacco Use: Never     Passive Exposure: Not on file     Social History     Substance and Sexual Activity   Alcohol Use Not Currently    Comment: Holidays, socially     Family History   Problem Relation Age of Onset    Alcohol abuse Other     Arthritis Other     Diabetes Other     Drug abuse Other     Hepatitis Other     Hypertension Other     Depression Mother     Lung cancer Mother     Thyroid disease Mother     Arthritis Mother     Cancer Mother         Lung cancer 2016    Diabetes Mother     Hyperlipidemia Mother     Hypertension Mother         AFIB    Miscarriages / Stillbirths Mother     Colon polyps Mother     Anxiety disorder Mother     Mental illness Mother     Asthma Mother     Hashimoto's thyroiditis Daughter     Thyroid disease Daughter         Hashimoto's    Breast cancer Maternal Aunt     Prostate cancer Maternal Uncle     Cancer Maternal Uncle         Thymus and Pancreatic cancer ()    Heart disease Paternal Grandmother         Congestive heart    Alcohol abuse Paternal Grandmother     Asthma Paternal Grandmother     Depression Paternal Grandmother     Alzheimer's disease Paternal Grandfather     Alcohol abuse Paternal Grandfather     Depression Paternal Grandfather     Alcohol abuse Brother     Drug abuse Brother         Alcoholic for 20 yrs+    Arthritis Brother         Dx with RA within last 3 years    Depression Brother     Drug abuse Brother         Marijuana  "for chronic pain    Learning disabilities Brother         ADHD    Vision loss Brother         Corrective lenses, Macular Degeneration    Arthritis Maternal Grandmother     Depression Maternal Grandmother     Diabetes Maternal Grandmother     Heart disease Maternal Grandmother         Had an aortic valve replace    Hyperlipidemia Maternal Grandmother     Hypertension Maternal Grandmother     Thyroid disease Maternal Grandmother     Birth defects Maternal Uncle         CP    Hyperlipidemia Maternal Uncle     Cancer Maternal Grandfather         Kidney, bone cancer 1980's    Cancer Maternal Aunt         Breast cancer 2003    Depression Maternal Aunt     Miscarriages / Stillbirths Maternal Aunt     Vision loss Maternal Aunt         Corrective lenses    Anxiety disorder Maternal Aunt     Arthritis Maternal Aunt         DX with RA after having COVID    Cancer Maternal Aunt         Lung cancer 2015    Depression Maternal Aunt     Diabetes Maternal Aunt     Cancer Maternal Uncle         Prostate Cancer    Vision loss Maternal Uncle         Corrective lenses    Cancer Maternal Uncle         Thymus and Pancreatic Cancer    Depression Father     Diabetes Father     Hyperlipidemia Father     Colon polyps Father     Vision loss Father         Corrective lenses    Arthritis Father     Hearing loss Father         Hearing loss 2020    Other Father         Dx with Parkinsons 2022    Depression Daughter     Learning disabilities Daughter         ADHD    Vision loss Daughter         \" \"    Diabetes Maternal Aunt         Pancreatic Insulinoma removed    Thyroid disease Maternal Aunt     Vision loss Maternal Aunt         \"  \"    Learning disabilities Son         ADHD, SPD    Depression Son         Depression, ADHD, OCD, Anxiety    Other Paternal Uncle         ALS    Malig Hyperthermia Neg Hx                   Assessment and Plan     Diagnoses and all orders for this visit:    1. Encounter to establish care (Primary)    2. Pure " hypercholesterolemia  Assessment & Plan:  Continue with lifestyle modification including low saturated fat diet choices.     Orders:  -     Lipid panel  -     TSH Rfx On Abnormal To Free T4    3. Anxiety  Assessment & Plan:  Continues on wellbutrin, prozac  Denies SI    Orders:  -     Ambulatory Referral to Behavioral Health    4. Attention deficit disorder (ADD) without hyperactivity  Assessment & Plan:  Has been off medication since 2022  Needs referral     Orders:  -     Ambulatory Referral to Behavioral Health    5. Monopolar depression  Assessment & Plan:  Continues on wellbutrin, prozac   Denies SI    Orders:  -     Ambulatory Referral to Behavioral Health    6. Mild intermittent asthma, unspecified whether complicated  Assessment & Plan:  Using albuterol sparingly  Denies wheezing, SOA, KEN today           7. Obstructive sleep apnea syndrome  Assessment & Plan:  Needs further f/u with sleep medicine  Has CPAP but it was recalled    Orders:  -     Ambulatory Referral to Sleep Medicine    8. Tinnitus of both ears  -     Ambulatory Referral to ENT (Otolaryngology)    9. Gastroesophageal reflux disease without esophagitis  Assessment & Plan:  Stable on PPI      10. History of benign schwannoma  Assessment & Plan:  Follows with neurosurgery  Needs to reschedule EMG      11. Neuropathic pain  Assessment & Plan:  Working on scheduling EMG with neurology      12. Memory loss  Assessment & Plan:  MRI brain ordered  Labs today  May further need consultation with neurology based upon results; will f/u    Orders:  -     Cancel: Vitamin B12  -     Cancel: Vitamin B6  -     Cancel: Heavy Metals, Blood  -     Cancel: CBC & Differential  -     Cancel: Comprehensive metabolic panel  -     MRI Brain With & Without Contrast; Future  -     Vitamin B6  -     Vitamin B12  -     CBC & Differential  -     Comprehensive metabolic panel  -     Heavy Metals, Blood    13. Class 1 obesity due to excess calories with serious comorbidity  and body mass index (BMI) of 31.0 to 31.9 in adult  Assessment & Plan:  BMI is >= 30 and <35. (Class 1 Obesity). The following options were offered after discussion;: exercise counseling/recommendations and nutrition counseling/recommendations      14. Primary insomnia  Assessment & Plan:  Stable on trazodone             I spent 45 minutes caring for Machelle on this date of service. This time includes time spent by me in the following activities:preparing for the visit, reviewing tests, obtaining and/or reviewing a separately obtained history, performing a medically appropriate examination and/or evaluation , counseling and educating the patient/family/caregiver, ordering medications, tests, or procedures, documenting information in the medical record, and care coordination  Follow Up     Return in about 3 months (around 7/30/2024) for Medicare Wellness.  Patient was given instructions and counseling regarding her condition or for health maintenance advice. Please see specific information pulled into the AVS if appropriate.

## 2024-04-30 NOTE — ASSESSMENT & PLAN NOTE
MRI brain ordered  Labs today  May further need consultation with neurology based upon results; will f/u

## 2024-04-30 NOTE — PROGRESS NOTES
"The ABCs of the Annual Wellness Visit  Subsequent Medicare Wellness Visit    Subjective    Machelle Ortega is a 58 y.o. female who presents for a Subsequent Medicare Wellness Visit.    The following portions of the patient's history were reviewed and   updated as appropriate: {history reviewed:20406::\"allergies\",\"current medications\",\"past family history\",\"past medical history\",\"past social history\",\"past surgical history\",\"problem list\"}.    Compared to one year ago, the patient feels her physical   health is {better worse same:48043}.    Compared to one year ago, the patient feels her mental   health is {better worse same:06165}.    Recent Hospitalizations:  {Hospital Admission Status in the last 365 days:14231}      Current Medical Providers:  Patient Care Team:  Ira Vila APRN as PCP - General (Internal Medicine)  Antionette Herndon MD as Consulting Physician (Obstetrics and Gynecology)  Andrew Hopkins III, MD as Surgeon (Thoracic Surgery)  Dayron Jarrett MD as Surgeon (Neurosurgery)  Vitaliy Schafer MD as Consulting Physician (Pulmonary Disease)  Mohit Natarajan MD as Surgeon (General Surgery)  Vicente Cheng MD as Consulting Physician (Pulmonary Disease)  Ranjit London MD as Consulting Physician (Pain Medicine)  Fatimah Manley MD as Consulting Physician (Orthopedic Surgery)  Neftali Nayak MD as Consulting Physician (Physical Medicine and Rehabilitation)    Outpatient Medications Prior to Visit   Medication Sig Dispense Refill    albuterol (ACCUNEB) 1.25 MG/3ML nebulizer solution Take 3 mL by nebulization Every 6 (Six) Hours As Needed for Wheezing. 30 each 4    albuterol sulfate  (90 Base) MCG/ACT inhaler Inhale 2 puffs Every 4 (Four) Hours As Needed for Wheezing. 6.7 g 1    buPROPion XL (WELLBUTRIN XL) 150 MG 24 hr tablet Take 1 tablet by mouth Every Morning. 90 tablet 1    cholecalciferol (VITAMIN D3) 25 MCG (1000 UT) tablet Take 1 tablet by mouth Daily.  "     Diclofenac Sodium (VOLTAREN) 1 % gel gel Apply 4 g topically to the appropriate area as directed 2 (Two) Times a Day As Needed (for RLE pain). 350 g 1    Ferrous Sulfate (IRON PO) Take  by mouth.      FLUoxetine (PROzac) 20 MG capsule Take 3 capsules by mouth Every Night. 270 capsule 1    LORazepam (ATIVAN) 0.5 MG tablet TAKE 1 TABLET BY MOUTH EVERY 8 HOURS AS NEEDED FOR ANXIETY 30 tablet 0    pantoprazole (PROTONIX) 40 MG EC tablet Take 1 tablet by mouth Daily. 90 tablet 1    solifenacin (VESICARE) 10 MG tablet Take 1 tablet by mouth Daily. 90 tablet 1    traZODone (DESYREL) 100 MG tablet Take 2 tablets by mouth Every Night. 180 tablet 1    Turmeric (QC TUMERIC COMPLEX PO) Take  by mouth.       No facility-administered medications prior to visit.       No opioid medication identified on active medication list. I have reviewed chart for other potential  high risk medication/s and harmful drug interactions in the elderly.        Aspirin is not on active medication list.  {ASPIRIN NOT ON MEDICATION LIST INDICATED/NOT INDICATED:61102}.    Patient Active Problem List   Diagnosis    Ataxic gait    Chest pain, pleuritic    DDD (degenerative disc disease), cervical    DDD (degenerative disc disease), lumbar    Dry cough    Low back pain    Chronic nausea    Loss of feeling or sensation    Pain in thoracic spine    Pleural cavity effusion    Breath shortness    Cervical spinal stenosis    IBS (irritable bowel syndrome)    GERD (gastroesophageal reflux disease)    Multiple joint complaints    Attention deficit disorder (ADD) without hyperactivity    Fibromyalgia    Fatigue    Sleep apnea    Hyperlipidemia    History of benign schwannoma    Other cervical disc degeneration, high cervical region    Chronic pain    Postlaminectomy syndrome, thoracic    Diaphragmatic hernia without obstruction and without gangrene    Lumbar radiculopathy    History of spinal surgery    Neuropathic pain    Balance disorder    Primary insomnia  "   Frequent episodes of pneumonia    Anxiety    Frequent episodes of bronchitis    Anemia    Weakness of both legs    Mixed stress and urge urinary incontinence    Cervical radiculopathy    History of benign spinal cord tumor    Intradural extramedullary spinal tumor    Spinal stenosis of lumbar region with neurogenic claudication    Mild intermittent asthma    Monopolar depression    Chronic pain of both shoulders     Advance Care Planning   Advance Care Planning     Advance Directive is not on file.  {ACP Discussion, Advance Directive not in EMR:62460}     Objective    Vitals:    04/30/24 0817   Weight: 83.9 kg (185 lb)   Height: 162.6 cm (64\")     Estimated body mass index is 31.76 kg/m² as calculated from the following:    Height as of this encounter: 162.6 cm (64\").    Weight as of this encounter: 83.9 kg (185 lb).    {BMI is >= 30 and <35. (Class 1 Obesity). The following options were offered after discussion;:[id number]}      Does the patient have evidence of cognitive impairment? {Yes/No:36125}          HEALTH RISK ASSESSMENT    Smoking Status:  Social History     Tobacco Use   Smoking Status Never   Smokeless Tobacco Never     Alcohol Consumption:  Social History     Substance and Sexual Activity   Alcohol Use Not Currently    Comment: Holidays, socially     Fall Risk Screen:    [facility].    Depression Screening:      3/14/2023     3:14 PM   PHQ-2/PHQ-9 Depression Screening   Little Interest or Pleasure in Doing Things 0-->not at all   Feeling Down, Depressed or Hopeless 2-->more than half the days   Trouble Falling or Staying Asleep, or Sleeping Too Much 0-->not at all   Feeling Tired or Having Little Energy 0-->not at all   Poor Appetite or Overeating 0-->not at all   Feeling Bad about Yourself - or that You are a Failure or Have Let Yourself or Your Family Down 0-->not at all   Trouble Concentrating on Things, Such as Reading the Newspaper or Watching Television " 2-->more than half the days   Moving or Speaking So Slowly that Other People Could Have Noticed? Or the Opposite - Being So Fidgety 0-->not at all   Thoughts that You Would be Better Off Dead or of Hurting Yourself in Some Way 0-->not at all   PHQ-9: Brief Depression Severity Measure Score 4   If You Checked Off Any Problems, How Difficult Have These Problems Made It For You to Do Your Work, Take Care of Things at Home, or Get Along with Other People? not difficult at all       Health Habits and Functional and Cognitive Screening:      3/14/2023     3:14 PM   Functional & Cognitive Status   Do you have difficulty preparing food and eating? No   Do you have difficulty bathing yourself, getting dressed or grooming yourself? No   Do you have difficulty using the toilet? No   Do you have difficulty moving around from place to place? No   Do you have trouble with steps or getting out of a bed or a chair? Yes   Current Diet Well Balanced Diet   Dental Exam Up to date   Eye Exam Up to date   Exercise (times per week) 0 times per week   Current Exercises Include No Regular Exercise   Do you need help using the phone?  No   Are you deaf or do you have serious difficulty hearing?  No   Do you need help to go to places out of walking distance? No   Do you need help shopping? No   Do you need help preparing meals?  No   Do you need help with housework?  No   Do you need help with laundry? No   Do you need help taking your medications? No   Do you need help managing money? No   Do you ever drive or ride in a car without wearing a seat belt? No   Have you felt unusual stress, anger or loneliness in the last month? Yes   Who do you live with? Spouse   If you need help, do you have trouble finding someone available to you? No   Have you been bothered in the last four weeks by sexual problems? No   Do you have difficulty concentrating, remembering or making decisions? Yes       Age-appropriate Screening Schedule:  Refer to the list  "below for future screening recommendations based on patient's age, sex and/or medical conditions. Orders for these recommended tests are listed in the plan section. The patient has been provided with a written plan.    Health Maintenance   Topic Date Due    ZOSTER VACCINE (2 of 2) 07/24/2018    Pneumococcal Vaccine 0-64 (2 of 2 - PPSV23 or PCV20) 01/26/2022    COVID-19 Vaccine (3 - 2023-24 season) 09/01/2023    MAMMOGRAM  01/24/2024    TDAP/TD VACCINES (2 - Td or Tdap) 02/06/2024    ANNUAL WELLNESS VISIT  03/14/2024    LIPID PANEL  03/14/2024    BMI FOLLOWUP  03/14/2024    PAP SMEAR  06/01/2024    INFLUENZA VACCINE  08/01/2024    DXA SCAN  01/24/2025    COLORECTAL CANCER SCREENING  03/22/2027    HEPATITIS C SCREENING  Completed                  CMS Preventative Services Quick Reference  Risk Factors Identified During Encounter  {Medicare Wellness Risk Factors:55915}  The above risks/problems have been discussed with the patient.  Pertinent information has been shared with the patient in the After Visit Summary.  An After Visit Summary and PPPS were made available to the patient.    Follow Up:   Next Medicare Wellness visit to be scheduled in 1 year.   {Wrapup  Review (Popup)  Advance Care Planning  Labs  CC  Problem List  Visit Diagnosis  Medications  Result Review  Imaging  [facility] Maintenance  Quality  BestPractice  SmartSets  SnapShot  Encounters  Notes  Media  Procedures :23}    Additional E&M Note during same encounter follows:  Patient has multiple medical problems which are significant and separately identifiable that require additional work above and beyond the Medicare Wellness Visit.      Chief Complaint  Medicare Wellness-subsequent and Rash    Subjective    {Problem List  Visit Diagnosis   Encounters  Notes  Medications  Labs  Result Review Imaging  Media :23}    HPI  Machelle Ortega is also being seen today for ***         Objective   Vital Signs:  Ht 162.6 cm (64\")   Wt 83.9 kg " (185 lb)   BMI 31.76 kg/m²     Physical Exam     {The following data was reviewed by (Optional):54946}  {Ambulatory Labs (Optional):00282}  {Data reviewed (Optional):89014:::1}           Assessment and Plan {CC Problem List  Visit Diagnosis   ROS  Review (Popup)  Health Maintenance  Quality  BestPractice  Medications  SmartSets  SnapShot Encounters  Media :23}  There are no diagnoses linked to this encounter.       {Time Spent (Optional):58655}  Follow Up {Instructions Charge Capture  Follow-up Communications :23}  No follow-ups on file.  Patient was given instructions and counseling regarding her condition or for health maintenance advice. Please see specific information pulled into the AVS if appropriate.

## 2024-05-01 ENCOUNTER — PATIENT ROUNDING (BHMG ONLY) (OUTPATIENT)
Dept: INTERNAL MEDICINE | Facility: CLINIC | Age: 59
End: 2024-05-01
Payer: COMMERCIAL

## 2024-06-06 ENCOUNTER — TELEPHONE (OUTPATIENT)
Dept: INTERNAL MEDICINE | Facility: CLINIC | Age: 59
End: 2024-06-06
Payer: COMMERCIAL

## 2024-06-06 DIAGNOSIS — R41.3 MEMORY LOSS: Primary | ICD-10-CM

## 2024-06-06 NOTE — TELEPHONE ENCOUNTER
Patient is scheduled to have fasting labs done June 13 at 10:15    As for MRI, patient stated she can not have this done because of her spinal stimulator. Patient state she probably need a CT done instead. Please advise

## 2024-06-16 LAB
ALBUMIN SERPL-MCNC: 4.1 G/DL (ref 3.8–4.9)
ALBUMIN/GLOB SERPL: 1.8 {RATIO}
ALP SERPL-CCNC: 117 IU/L (ref 44–121)
ALT SERPL-CCNC: 11 IU/L (ref 0–32)
ARSENIC BLD-MCNC: 2 UG/L (ref 0–9)
AST SERPL-CCNC: 17 IU/L (ref 0–40)
BASOPHILS # BLD AUTO: 0 X10E3/UL (ref 0–0.2)
BASOPHILS NFR BLD AUTO: 1 %
BILIRUB SERPL-MCNC: <0.2 MG/DL (ref 0–1.2)
BUN SERPL-MCNC: 21 MG/DL (ref 6–24)
BUN/CREAT SERPL: 24 (ref 9–23)
CALCIUM SERPL-MCNC: 9.6 MG/DL (ref 8.7–10.2)
CHLORIDE SERPL-SCNC: 104 MMOL/L (ref 96–106)
CHOLEST SERPL-MCNC: 198 MG/DL (ref 100–199)
CO2 SERPL-SCNC: 25 MMOL/L (ref 20–29)
CREAT SERPL-MCNC: 0.86 MG/DL (ref 0.57–1)
EGFRCR SERPLBLD CKD-EPI 2021: 78 ML/MIN/1.73
EOSINOPHIL # BLD AUTO: 0.1 X10E3/UL (ref 0–0.4)
EOSINOPHIL NFR BLD AUTO: 3 %
ERYTHROCYTE [DISTWIDTH] IN BLOOD BY AUTOMATED COUNT: 12.8 % (ref 11.7–15.4)
GLOBULIN SER CALC-MCNC: 2.3 G/DL (ref 1.5–4.5)
GLUCOSE SERPL-MCNC: 92 MG/DL (ref 70–99)
HCT VFR BLD AUTO: 36.5 % (ref 34–46.6)
HDLC SERPL-MCNC: 46 MG/DL
HGB BLD-MCNC: 12 G/DL (ref 11.1–15.9)
IMM GRANULOCYTES # BLD AUTO: 0 X10E3/UL (ref 0–0.1)
IMM GRANULOCYTES NFR BLD AUTO: 0 %
LDLC SERPL CALC-MCNC: 124 MG/DL (ref 0–99)
LEAD BLDV-MCNC: <1 UG/DL (ref 0–3.4)
LYMPHOCYTES # BLD AUTO: 1.4 X10E3/UL (ref 0.7–3.1)
LYMPHOCYTES NFR BLD AUTO: 36 %
MCH RBC QN AUTO: 28.5 PG (ref 26.6–33)
MCHC RBC AUTO-ENTMCNC: 32.9 G/DL (ref 31.5–35.7)
MCV RBC AUTO: 87 FL (ref 79–97)
MERCURY BLD-MCNC: <1 UG/L (ref 0–14.9)
MONOCYTES # BLD AUTO: 0.3 X10E3/UL (ref 0.1–0.9)
MONOCYTES NFR BLD AUTO: 7 %
NEUTROPHILS # BLD AUTO: 2.1 X10E3/UL (ref 1.4–7)
NEUTROPHILS NFR BLD AUTO: 53 %
PLATELET # BLD AUTO: 262 X10E3/UL (ref 150–450)
POTASSIUM SERPL-SCNC: 4.6 MMOL/L (ref 3.5–5.2)
PROT SERPL-MCNC: 6.4 G/DL (ref 6–8.5)
PYRIDOXAL PHOS SERPL-MCNC: 16 UG/L (ref 3.4–65.2)
RBC # BLD AUTO: 4.21 X10E6/UL (ref 3.77–5.28)
SODIUM SERPL-SCNC: 142 MMOL/L (ref 134–144)
TRIGL SERPL-MCNC: 157 MG/DL (ref 0–149)
TSH SERPL DL<=0.005 MIU/L-ACNC: 3.55 UIU/ML (ref 0.45–4.5)
VIT B12 SERPL-MCNC: 334 PG/ML (ref 232–1245)
VLDLC SERPL CALC-MCNC: 28 MG/DL (ref 5–40)
WBC # BLD AUTO: 3.9 X10E3/UL (ref 3.4–10.8)

## 2024-06-30 ENCOUNTER — HOSPITAL ENCOUNTER (OUTPATIENT)
Facility: HOSPITAL | Age: 59
Discharge: HOME | End: 2024-06-30
Admitting: NURSE PRACTITIONER
Payer: COMMERCIAL

## 2024-06-30 DIAGNOSIS — R41.3 MEMORY LOSS: ICD-10-CM

## 2024-06-30 PROCEDURE — 70450 CT HEAD/BRAIN W/O DYE: CPT

## 2024-07-05 DIAGNOSIS — R41.3 MEMORY LOSS: Primary | ICD-10-CM

## 2024-07-10 ENCOUNTER — OFFICE VISIT (OUTPATIENT)
Dept: SLEEP MEDICINE | Facility: HOSPITAL | Age: 59
End: 2024-07-10
Payer: MEDICARE

## 2024-07-10 VITALS — HEIGHT: 64 IN | BODY MASS INDEX: 31.76 KG/M2 | HEART RATE: 64 BPM | OXYGEN SATURATION: 96 % | WEIGHT: 186 LBS

## 2024-07-10 DIAGNOSIS — G47.33 OBSTRUCTIVE SLEEP APNEA SYNDROME: Primary | ICD-10-CM

## 2024-07-10 DIAGNOSIS — G47.14 HYPERSOMNIA DUE TO MEDICAL CONDITION: ICD-10-CM

## 2024-07-10 PROCEDURE — G0463 HOSPITAL OUTPT CLINIC VISIT: HCPCS

## 2024-07-10 PROCEDURE — 1159F MED LIST DOCD IN RCRD: CPT | Performed by: INTERNAL MEDICINE

## 2024-07-10 PROCEDURE — 1160F RVW MEDS BY RX/DR IN RCRD: CPT | Performed by: INTERNAL MEDICINE

## 2024-07-10 PROCEDURE — 99204 OFFICE O/P NEW MOD 45 MIN: CPT | Performed by: INTERNAL MEDICINE

## 2024-07-10 NOTE — PROGRESS NOTES
[facility] New [facility]ultation       Reason for Consultation: SONU    Patient Care Team:  Ira Vila APRN as PCP - General (Internal Medicine)  Antionette Herndon MD as Consulting Physician (Obstetrics and Gynecology)  Dayron Jarrett MD as Surgeon (Neurosurgery)  Fatimah Manley MD as Consulting Physician (Orthopedic Surgery)  Hailey Dunn MD as Surgeon (Thoracic Surgery)  Vicente Cheng MD as Consulting Physician (Sleep Medicine)    Chief complaint: SONU    History of present illness:    Thank you for asking me to see your patient.  The patient is a 58 y.o. female who was previously diagnosed with obstructive sleep apnea dating back to at least 2014.  Due to the Jose recall, the patient stopped using her CPAP.  The patient is here for reevaluation.    The patient goes to bed 11 PM and awakens at 9 AM.  She falls asleep within 15 to 20 minutes.  She is tired upon awakening.  She might take 1 nap daily.  [place] Sleepiness Scale borderline normal at 7.  The patient states she is lost 20 pounds in the last several years.  Besides snoring, witnessed apnea noted.  She has awaken coughing and choking.  She has nocturnal pain.  She reports she has fibromyalgia.  She grinds her teeth.  She will have sudden episodes of sleep during the daytime and she has had sleep paralysis.  She has frequent awakenings and will use the restroom once during the nighttime.  She is sleepy if she increases her sleep time.    Review of Systems:    A complete review of systems was done and all were negative with the exception of some nasal congestion, neck pain, seasonal wheezing, anxiety and depression.    History:  Past Medical History:   Diagnosis Date    Allergic 1984    Seasonal    Anxiety     Arthritis     Asthma Seasonally    Attention deficit hyperactivity disorder     Balance problem     Cataract 1996    Had cataracts surgery in 2021    Chronic pain     Claustrophobia     DDD (degenerative  disc disease), lumbar     Depression     Fatigue     Fibromyalgia     Fibromyalgia, primary 2006    See above    Fibromyositis     GERD (gastroesophageal reflux disease)     Headache     My son has frequent headaches    History of mononucleosis     Hyperlipidemia     IBS (irritable bowel syndrome)     Iron deficiency anemia     Lumbago     Mood disorder     Pneumonia 2015    Got again in     Scoliosis 2001    Sleep apnea     Compliant w/ C-Pap    Urinary tract infection Last week    Visual impairment     I had lasix in    ,   Past Surgical History:   Procedure Laterality Date    APPENDECTOMY  2014    Emergency performed by [facility]    BLADDER NECK RECONSTRUCTION N/A     BREAST BIOPSY  Several    On both sides    BRONCHOSCOPY N/A 2014    Chronic cough with abnormal chest x-ray, the exam was normal, fluoroscopically guided transbronchial brushings were obtained, transbronchial lung biopsies were performed, bronchoalveolar lavage was performed-Dr. Vitaliy Schafer    CERVICAL CORPECTOMY N/A 2014    Anterior cervical corpectomy at C4 and C5; Anterior cervical arthrodesis at C3-C4, C4-C5 and C5-C6; placement of instrumentation; use of intraoperative microscope for microdissection; harvest of vertebral body autograft through same incision; prep of morcellized allograft-Dr. Dayron Jarrett     SECTION N/A 2007    Dr. Briana Mcdaniel    COLONOSCOPY N/A 3/22/2017    Procedure: COLONOSCOPY TO CECUM ;  Surgeon: Mohit Natarajan MD;  Location: [facility] ENDOSCOPY;  Service:     D & C HYSTEROSCOPY N/A 2005    Hysteroscopy, D&C, and paracervical block-Dr. Antionette Herndon    D & C HYSTEROSCOPY ENDOMETRIAL ABLATION N/A 09/15/2014    Hysteroscopy, D&C, NovaSure ablation-Dr. Antionette Herndon    ENDOMETRIAL ABLATION  2015    ENDOSCOPY N/A 3/22/2017    Procedure: ESOPHAGOGASTRODUODENOSCOPY WITH BX ;  Surgeon: Mohit Natarajan MD;  Location: [facility] ENDOSCOPY;  Service:     EYE SURGERY      Lasik     LIVER SURGERY      LUMBAR SPINAL TUMOR REMOVAL N/A     spinal tumor   T10/11 schwannoma    LUNG LOBECTOMY Right 2014    Exploratory bronchoscopy, exploratory right video-assisted thoracoscopy, parietal pleural biopsy, diaphragmatic biopsy, wedge resection of right lower lobe, excision of ectopic hepatic lobe, primary repair of diagphragm, subpleural pain catheters x2, mechanical pleurodesis-Dr. Darryl Benjamin    SPINAL CORD STIMULATOR IMPLANT      TRIAL    SPINAL CORD STIMULATOR IMPLANT Bilateral 2017    Procedure: SPINAL CORD STIMULATOR INSERTION PHASE 1,  laminotomy for placement of [place] [employer] lead  from T9 to T6;  Surgeon: Dayron Jarrett MD;  Location: [facility] MAIN OR;  Service:     SPINAL CORD STIMULATOR IMPLANT N/A 2017    Procedure: SPINAL CORD STIMULATOR INSERTION PHASE 2;  Surgeon: Dayron Jarrett MD;  Location: [facility] MAIN OR;  Service:     SPINE SURGERY      Laminectomy to remove golf ball size schwannoma   ,   Family History   Problem Relation Age of Onset    Alcohol abuse Other     Arthritis Other     Diabetes Other     Drug abuse Other     Hepatitis Other     Hypertension Other     Depression Mother     Lung cancer Mother     Thyroid disease Mother     Arthritis Mother     Cancer Mother         Lung cancer 2016    Diabetes Mother     Hyperlipidemia Mother     Hypertension Mother         AFIB    Miscarriages / Stillbirths Mother     Colon polyps Mother     Anxiety disorder Mother     Mental illness Mother     Asthma Mother     Hashimoto's thyroiditis Daughter     Thyroid disease Daughter         Hashimoto's    Breast cancer Maternal Aunt     Prostate cancer Maternal Uncle     Cancer Maternal Uncle         Thymus and Pancreatic cancer ()    Heart disease Paternal Grandmother         Congestive heart    Alcohol abuse Paternal Grandmother     Asthma Paternal Grandmother     Depression Paternal Grandmother     Alzheimer's disease Paternal Grandfather     Alcohol  "abuse Paternal Grandfather     Depression Paternal Grandfather     Alcohol abuse Brother     Drug abuse Brother         Alcoholic for 20 yrs+    Arthritis Brother         Dx with RA within last 3 years    Depression Brother     Drug abuse Brother         Marijuana for chronic pain    Learning disabilities Brother         ADHD    Vision loss Brother         Corrective lenses, Macular Degeneration    Arthritis Maternal Grandmother     Depression Maternal Grandmother     Diabetes Maternal Grandmother     Heart disease Maternal Grandmother         Had an aortic valve replace    Hyperlipidemia Maternal Grandmother     Hypertension Maternal Grandmother     Thyroid disease Maternal Grandmother     Birth defects Maternal Uncle         CP    Hyperlipidemia Maternal Uncle     Cancer Maternal Grandfather         Kidney, bone cancer 1980's    Cancer Maternal Aunt         Breast cancer 2003    Depression Maternal Aunt     Miscarriages / Stillbirths Maternal Aunt     Vision loss Maternal Aunt         Corrective lenses    Anxiety disorder Maternal Aunt     Arthritis Maternal Aunt         DX with RA after having COVID    Cancer Maternal Aunt         Lung cancer 2015    Depression Maternal Aunt     Diabetes Maternal Aunt     Cancer Maternal Uncle         Prostate Cancer    Vision loss Maternal Uncle         Corrective lenses    Cancer Maternal Uncle         Thymus and Pancreatic Cancer    Depression Father     Diabetes Father     Hyperlipidemia Father     Colon polyps Father     Vision loss Father         Corrective lenses    Arthritis Father     Hearing loss Father         Hearing loss 2020    Other Father         Dx with Parkinsons 2022    Depression Daughter     Learning disabilities Daughter         ADHD    Vision loss Daughter         \" \"    Diabetes Maternal Aunt         Pancreatic Insulinoma removed    Thyroid disease Maternal Aunt     Vision loss Maternal Aunt         \"  \"    Learning disabilities Son         ADHD, SPD    " "Depression Son         Depression, ADHD, OCD, Anxiety    Other Paternal Uncle         ALS    Malalex Hyperthermia Neg Hx    , and   Social History     Socioeconomic History    Marital status:    Tobacco Use    Smoking status: Never    Smokeless tobacco: Never   Vaping Use    Vaping status: Never Used   Substance and Sexual Activity    Alcohol use: Not Currently     Comment: Holidays, socially    Drug use: Never    Sexual activity: Not Currently     Partners: Male     Birth control/protection: Abstinence, None     E-cigarette/Vaping    E-cigarette/Vaping Use Never User      E-cigarette/Vaping Substances     E-cigarette/Vaping Devices      Social History: The patient is disabled.  She will have 1 tea daily.    Allergies:  Budesonide-formoterol fumarate, Acetaminophen, Oxycodone-acetaminophen, and Pregabalin     Medication Review: Trazodone Prozac Protonix Wellbutrin Vesicare    Vital Signs:    Vitals:    07/10/24 0927   Pulse: 64   SpO2: 96%   Weight: 84.4 kg (186 lb)   Height: 162.6 cm (64\")      Body mass index is 31.93 kg/m².  Neck Circumference: 15.75 inches      Physical Exam:    Constitutional:  Well developed 58 y.o. female that appears in no apparent distress.  Awake & oriented times 3.  Normal mood with normal recent and remote memory and normal judgement.  Eyes:  Conjunctivae normal.  Oropharynx: Moist mucous membranes without exudate and a normal-sized tongue and uvula and patent posterior pharyngeal opening class II Mallampati airway.    Neck: Trachea midline  Respiratory: Effort is not labored  Cardiovascular: Radial pulse regular  Musculoskeletal: Gait appears normal, no digital clubbing evident, no pre-tibial edema    Impression:   The patient has previously been diagnosed with obstructive sleep apnea and was using CPAP.  However, due to the Jose recall, the patient stopped using CPAP.    Presently, the patient demonstrates symptoms and signs consistent with untreated obstructive sleep apnea and " has complaints of hypersomnolence.    Plan:  Good sleep hygiene measures should be maintained.  Weight loss would be beneficial in this patient who is obese by Body mass index is 31.93 kg/m².    Pathophysiology of SONU described to the patient.  Cardiovascular complications of untreated SONU also reviewed.      After reviewing all with the patient, I would recommend that she is agreeable to proceed with a home sleep study to reevaluate obstructive sleep apnea.  I described the procedure to her.  If obstructive sleep apnea rediagnosed, expected, I would recommend auto CPAP set up.  The patient should use nasal pillows.  The patient has been using his auto CPAP.  DME is [facility].  I answered all of her questions.  Further recommendations will be made once the results of the home sleep study are known.    Thank you for requesting me to assist in this patient's care.    Vicente Cheng MD  Sleep Medicine  07/10/24  09:45 EDT

## 2024-07-13 PROBLEM — G47.14 HYPERSOMNIA DUE TO MEDICAL CONDITION: Status: ACTIVE | Noted: 2024-07-13

## 2024-07-17 ENCOUNTER — HOSPITAL ENCOUNTER (OUTPATIENT)
Dept: SLEEP MEDICINE | Facility: HOSPITAL | Age: 59
Discharge: HOME | End: 2024-07-17
Admitting: INTERNAL MEDICINE
Payer: MEDICARE

## 2024-07-17 DIAGNOSIS — G47.33 OBSTRUCTIVE SLEEP APNEA SYNDROME: ICD-10-CM

## 2024-07-17 PROCEDURE — 95806 SLEEP STUDY UNATT&RESP EFFT: CPT

## 2024-07-20 PROCEDURE — 95806 SLEEP STUDY UNATT&RESP EFFT: CPT | Performed by: INTERNAL MEDICINE

## 2024-07-22 ENCOUNTER — TELEPHONE (OUTPATIENT)
Dept: SLEEP MEDICINE | Facility: HOSPITAL | Age: 59
End: 2024-07-22
Payer: MEDICARE

## 2024-07-22 NOTE — TELEPHONE ENCOUNTER
..[facility] requesting pt call if they have any questions about sleep study results , a preferred DME and to scheduled a compliance follow up once set up on CPAP. Sending orders to [facility]   unless pt requests otherwise

## 2024-12-11 ENCOUNTER — OFFICE VISIT (OUTPATIENT)
Dept: INTERNAL MEDICINE | Facility: CLINIC | Age: 59
End: 2024-12-11
Payer: MEDICARE

## 2024-12-11 VITALS
TEMPERATURE: 96.6 F | SYSTOLIC BLOOD PRESSURE: 124 MMHG | HEIGHT: 64 IN | BODY MASS INDEX: 31.41 KG/M2 | WEIGHT: 184 LBS | OXYGEN SATURATION: 96 % | DIASTOLIC BLOOD PRESSURE: 86 MMHG | HEART RATE: 83 BPM

## 2024-12-11 DIAGNOSIS — G89.29 CHRONIC PAIN OF BOTH SHOULDERS: Chronic | ICD-10-CM

## 2024-12-11 DIAGNOSIS — K21.9 GASTROESOPHAGEAL REFLUX DISEASE WITHOUT ESOPHAGITIS: Chronic | ICD-10-CM

## 2024-12-11 DIAGNOSIS — M25.512 CHRONIC PAIN OF BOTH SHOULDERS: Chronic | ICD-10-CM

## 2024-12-11 DIAGNOSIS — F98.8 ATTENTION DEFICIT DISORDER (ADD) WITHOUT HYPERACTIVITY: Chronic | ICD-10-CM

## 2024-12-11 DIAGNOSIS — D64.9 ANEMIA, UNSPECIFIED TYPE: Chronic | ICD-10-CM

## 2024-12-11 DIAGNOSIS — R73.01 IMPAIRED FASTING GLUCOSE: ICD-10-CM

## 2024-12-11 DIAGNOSIS — G47.33 OBSTRUCTIVE SLEEP APNEA SYNDROME: Chronic | ICD-10-CM

## 2024-12-11 DIAGNOSIS — J45.20 MILD INTERMITTENT ASTHMA, UNSPECIFIED WHETHER COMPLICATED: Chronic | ICD-10-CM

## 2024-12-11 DIAGNOSIS — R41.3 MEMORY LOSS: Chronic | ICD-10-CM

## 2024-12-11 DIAGNOSIS — N39.46 MIXED STRESS AND URGE URINARY INCONTINENCE: Chronic | ICD-10-CM

## 2024-12-11 DIAGNOSIS — F41.9 ANXIETY: Chronic | ICD-10-CM

## 2024-12-11 DIAGNOSIS — M25.511 CHRONIC PAIN OF BOTH SHOULDERS: Chronic | ICD-10-CM

## 2024-12-11 DIAGNOSIS — Z86.018 HISTORY OF BENIGN SCHWANNOMA: ICD-10-CM

## 2024-12-11 DIAGNOSIS — K44.9 DIAPHRAGMATIC HERNIA WITHOUT OBSTRUCTION AND WITHOUT GANGRENE: Chronic | ICD-10-CM

## 2024-12-11 DIAGNOSIS — Z00.00 ANNUAL PHYSICAL EXAM: ICD-10-CM

## 2024-12-11 DIAGNOSIS — E78.2 MIXED HYPERLIPIDEMIA: Chronic | ICD-10-CM

## 2024-12-11 DIAGNOSIS — F33.9 MONOPOLAR DEPRESSION: Chronic | ICD-10-CM

## 2024-12-11 DIAGNOSIS — Z00.00 MEDICARE ANNUAL WELLNESS VISIT, SUBSEQUENT: Primary | ICD-10-CM

## 2024-12-11 PROBLEM — J40 FREQUENT EPISODES OF BRONCHITIS: Status: RESOLVED | Noted: 2020-02-07 | Resolved: 2024-12-11

## 2024-12-11 PROBLEM — J18.9 FREQUENT EPISODES OF PNEUMONIA: Status: RESOLVED | Noted: 2020-01-15 | Resolved: 2024-12-11

## 2024-12-11 LAB
ALBUMIN SERPL-MCNC: 4.6 G/DL (ref 3.5–5.2)
ALBUMIN/GLOB SERPL: 1.6 G/DL
ALP SERPL-CCNC: 117 U/L (ref 39–117)
ALT SERPL-CCNC: 28 U/L (ref 1–33)
AST SERPL-CCNC: 30 U/L (ref 1–32)
BASOPHILS # BLD AUTO: 0.02 10*3/MM3 (ref 0–0.2)
BASOPHILS NFR BLD AUTO: 0.2 % (ref 0–1.5)
BILIRUB SERPL-MCNC: 0.3 MG/DL (ref 0–1.2)
BUN SERPL-MCNC: 10 MG/DL (ref 6–20)
BUN/CREAT SERPL: 13.5 (ref 7–25)
CALCIUM SERPL-MCNC: 10.2 MG/DL (ref 8.6–10.5)
CHLORIDE SERPL-SCNC: 104 MMOL/L (ref 98–107)
CHOLEST SERPL-MCNC: 268 MG/DL (ref 0–200)
CO2 SERPL-SCNC: 25.4 MMOL/L (ref 22–29)
CREAT SERPL-MCNC: 0.74 MG/DL (ref 0.57–1)
EGFRCR SERPLBLD CKD-EPI 2021: 93.3 ML/MIN/1.73
EOSINOPHIL # BLD AUTO: 0.01 10*3/MM3 (ref 0–0.4)
EOSINOPHIL NFR BLD AUTO: 0.1 % (ref 0.3–6.2)
ERYTHROCYTE [DISTWIDTH] IN BLOOD BY AUTOMATED COUNT: 12.7 % (ref 12.3–15.4)
FERRITIN SERPL-MCNC: 80.4 NG/ML (ref 13–150)
FOLATE SERPL-MCNC: 10.1 NG/ML (ref 4.78–24.2)
GLOBULIN SER CALC-MCNC: 2.9 GM/DL
GLUCOSE SERPL-MCNC: 114 MG/DL (ref 65–99)
HBA1C MFR BLD: 5.4 % (ref 4.8–5.6)
HCT VFR BLD AUTO: 43.1 % (ref 34–46.6)
HDLC SERPL-MCNC: 68 MG/DL (ref 40–60)
HGB BLD-MCNC: 13.8 G/DL (ref 12–15.9)
IMM GRANULOCYTES # BLD AUTO: 0.02 10*3/MM3 (ref 0–0.05)
IMM GRANULOCYTES NFR BLD AUTO: 0.2 % (ref 0–0.5)
IRON SATN MFR SERPL: 17 % (ref 20–50)
IRON SERPL-MCNC: 75 MCG/DL (ref 37–145)
LDLC SERPL CALC-MCNC: 189 MG/DL (ref 0–100)
LYMPHOCYTES # BLD AUTO: 1.11 10*3/MM3 (ref 0.7–3.1)
LYMPHOCYTES NFR BLD AUTO: 13.3 % (ref 19.6–45.3)
MCH RBC QN AUTO: 27.9 PG (ref 26.6–33)
MCHC RBC AUTO-ENTMCNC: 32 G/DL (ref 31.5–35.7)
MCV RBC AUTO: 87.1 FL (ref 79–97)
MONOCYTES # BLD AUTO: 0.4 10*3/MM3 (ref 0.1–0.9)
MONOCYTES NFR BLD AUTO: 4.8 % (ref 5–12)
NEUTROPHILS # BLD AUTO: 6.81 10*3/MM3 (ref 1.7–7)
NEUTROPHILS NFR BLD AUTO: 81.4 % (ref 42.7–76)
NRBC BLD AUTO-RTO: 0 /100 WBC (ref 0–0.2)
PLATELET # BLD AUTO: 347 10*3/MM3 (ref 140–450)
POTASSIUM SERPL-SCNC: 4.9 MMOL/L (ref 3.5–5.2)
PROT SERPL-MCNC: 7.5 G/DL (ref 6–8.5)
RBC # BLD AUTO: 4.95 10*6/MM3 (ref 3.77–5.28)
SODIUM SERPL-SCNC: 140 MMOL/L (ref 136–145)
TIBC SERPL-MCNC: 434 MCG/DL
TRIGL SERPL-MCNC: 68 MG/DL (ref 0–150)
TSH SERPL DL<=0.005 MIU/L-ACNC: 1.34 UIU/ML (ref 0.27–4.2)
UIBC SERPL-MCNC: 359 MCG/DL (ref 112–346)
VIT B12 SERPL-MCNC: 394 PG/ML (ref 211–946)
VLDLC SERPL CALC-MCNC: 11 MG/DL (ref 5–40)
WBC # BLD AUTO: 8.37 10*3/MM3 (ref 3.4–10.8)

## 2024-12-11 PROCEDURE — G0439 PPPS, SUBSEQ VISIT: HCPCS | Performed by: NURSE PRACTITIONER

## 2024-12-11 PROCEDURE — 99214 OFFICE O/P EST MOD 30 MIN: CPT | Performed by: NURSE PRACTITIONER

## 2024-12-11 PROCEDURE — 99396 PREV VISIT EST AGE 40-64: CPT | Performed by: NURSE PRACTITIONER

## 2024-12-11 PROCEDURE — 1170F FXNL STATUS ASSESSED: CPT | Performed by: NURSE PRACTITIONER

## 2024-12-11 PROCEDURE — 1125F AMNT PAIN NOTED PAIN PRSNT: CPT | Performed by: NURSE PRACTITIONER

## 2024-12-11 PROCEDURE — 1160F RVW MEDS BY RX/DR IN RCRD: CPT | Performed by: NURSE PRACTITIONER

## 2024-12-11 PROCEDURE — 1159F MED LIST DOCD IN RCRD: CPT | Performed by: NURSE PRACTITIONER

## 2024-12-11 RX ORDER — NAPROXEN SODIUM 220 MG/1
220 TABLET, FILM COATED ORAL 2 TIMES DAILY PRN
COMMUNITY

## 2024-12-11 NOTE — ASSESSMENT & PLAN NOTE
[facility]  May benefit from involving Jeanette Blankenship due to hx of anxiety, depression  Denies SI  Orders:    Vitamin B12 & Folate

## 2024-12-11 NOTE — PROGRESS NOTES
Subjective   The ABCs of the Annual Wellness Visit  Medicare Wellness Visit      Machelle Ortega is a 59 y.o. patient who presents for a Medicare Wellness Visit.    The following portions of the patient's history were reviewed and   updated as appropriate: allergies, current medications, past family history, past medical history, past social history, past surgical history, and problem list.    Compared to one year ago, the patient's physical   health is better.  Compared to one year ago, the patient's mental   health is worse.    Recent Hospitalizations:  She was not admitted to the hospital during the last year.     Current Medical Providers:  Patient Care Team:  Ira Vila APRN as PCP - General (Internal Medicine)  Antionette Herndon MD as Consulting Physician (Obstetrics and Gynecology)  Dayron Jarrett MD as Surgeon (Neurosurgery)  Fatimah Manley MD as Consulting Physician (Orthopedic Surgery)  Hailey Dunn MD as Surgeon (Thoracic Surgery)  Martinez Vieyra MD as Consulting Physician (Neurology)    Outpatient Medications Prior to Visit   Medication Sig Dispense Refill    albuterol (ACCUNEB) 1.25 MG/3ML nebulizer solution Take 3 mL by nebulization Every 6 (Six) Hours As Needed for Wheezing. 30 each 4    albuterol sulfate  (90 Base) MCG/ACT inhaler Inhale 2 puffs Every 4 (Four) Hours As Needed for Wheezing. 6.7 g 1    buPROPion XL (WELLBUTRIN XL) 150 MG 24 hr tablet Take 1 tablet by mouth Every Morning. 90 tablet 1    cholecalciferol (VITAMIN D3) 25 MCG (1000 UT) tablet Take 1 tablet by mouth Daily.      Diclofenac Sodium (VOLTAREN) 1 % gel gel Apply 4 g topically to the appropriate area as directed 2 (Two) Times a Day As Needed (for RLE pain). 350 g 1    Ferrous Sulfate (IRON PO) Take  by mouth.      FLUoxetine (PROzac) 20 MG capsule Take 3 capsules by mouth Every Night. 270 capsule 1    naproxen sodium (ALEVE) 220 MG tablet Take 1 tablet by mouth 2 (Two) Times a Day As Needed.       pantoprazole (PROTONIX) 40 MG EC tablet Take 1 tablet by mouth Daily. 90 tablet 1    solifenacin (VESICARE) 10 MG tablet Take 1 tablet by mouth Daily. 90 tablet 1    traZODone (DESYREL) 100 MG tablet Take 2 tablets by mouth Every Night. 180 tablet 1    Turmeric (QC TUMERIC COMPLEX PO) Take  by mouth.       No facility-administered medications prior to visit.     No opioid medication identified on active medication list. I have reviewed chart for other potential  high risk medication/s and harmful drug interactions in the elderly.      Aspirin is not on active medication list.  Aspirin use is not indicated based on review of current medical condition/s. Risk of harm outweighs potential benefits.  .    Patient Active Problem List   Diagnosis    Ataxic gait    DDD (degenerative disc disease), cervical    DDD (degenerative disc disease), lumbar    Low back pain    Chronic nausea    Loss of feeling or sensation    Pain in thoracic spine    Pleural cavity effusion    Cervical spinal stenosis    IBS (irritable bowel syndrome)    GERD (gastroesophageal reflux disease)    Multiple joint complaints    Attention deficit disorder (ADD) without hyperactivity    Fibromyalgia    Fatigue    Sleep apnea    Hyperlipidemia    History of benign schwannoma    Other cervical disc degeneration, high cervical region    Chronic pain    Postlaminectomy syndrome, thoracic    Diaphragmatic hernia without obstruction and without gangrene    Lumbar radiculopathy    History of spinal surgery    Neuropathic pain    Balance disorder    Primary insomnia    Anxiety    Anemia    Weakness of both legs    Mixed stress and urge urinary incontinence    Cervical radiculopathy    History of benign spinal cord tumor    Intradural extramedullary spinal tumor    Spinal stenosis of lumbar region with neurogenic claudication    Mild intermittent asthma    Monopolar depression    Chronic pain of both shoulders    Class 1 obesity due to excess calories with  "serious comorbidity and body mass index (BMI) of 31.0 to 31.9 in adult    Memory loss    Hypersomnia due to medical condition    Annual physical exam     Advance Care Planning Advance Directive is not on file.  ACP discussion was held with the patient during this visit. Patient has an advance directive (not in EMR), copy requested.            Objective   Vitals:    12/11/24 0904   BP: 124/86   BP Location: Left arm   Patient Position: Sitting   Cuff Size: Adult   Pulse: 83   Temp: 96.6 °F (35.9 °C)   TempSrc: Temporal   SpO2: 96%   Weight: 83.5 kg (184 lb)   Height: 162.6 cm (64.02\")       Estimated body mass index is 31.57 kg/m² as calculated from the following:    Height as of this encounter: 162.6 cm (64.02\").    Weight as of this encounter: 83.5 kg (184 lb).            Does the patient have evidence of cognitive impairment? Yes                                                                                                Health  Risk Assessment    Smoking Status:  Social History     Tobacco Use   Smoking Status Never   Smokeless Tobacco Never     Alcohol Consumption:  Social History     Substance and Sexual Activity   Alcohol Use Not Currently    Comment: Holidays, socially       Fall Risk Screen  STEADI Fall Risk Assessment was completed, and patient is at HIGH risk for falls. Assessment completed on:12/11/2024    Depression Screening   Little interest or pleasure in doing things? Over half   Feeling down, depressed, or hopeless? Over half   PHQ-2 Total Score 4   Trouble falling or staying asleep, or sleeping too much? Over half   Feeling tired or having little energy? Almost all   Poor appetite or overeating? Several days   Feeling bad about yourself - or that you are a failure or have let yourself or your family down? Over half   Trouble concentrating on things, such as reading the newspaper or watching television? Almost all   Moving or speaking so slowly that other people could have noticed? Or the opposite " - being so fidgety or restless that you have been moving around a lot more than usual? Not at all   Thoughts that you would be better off dead, or of hurting yourself in some way? Not at all   PHQ-9 Total Score 15   If you checked off any problems, how difficult have these problems made it for you to do your work, take care of things at home, or get along with other people? Very difficult      Health Habits and Functional and Cognitive Screenin/11/2024     9:06 AM   Functional & Cognitive Status   Do you have difficulty preparing food and eating? No   Do you have difficulty bathing yourself, getting dressed or grooming yourself? No   Do you have difficulty using the toilet? No   Do you have difficulty moving around from place to place? No   Do you have trouble with steps or getting out of a bed or a chair? No   Current Diet Low Fat Diet   Dental Exam Up to date   Eye Exam Up to date   Exercise (times per week) 3 times per week   Current Exercises Include Walking   Do you need help using the phone?  No   Are you deaf or do you have serious difficulty hearing?  No   Do you need help to go to places out of walking distance? Yes   Do you need help shopping? No   Do you need help preparing meals?  No   Do you need help with housework?  No   Do you need help with laundry? No   Do you need help taking your medications? No   Do you need help managing money? Yes   Do you ever drive or ride in a car without wearing a seat belt? No   Have you felt unusual stress, anger or loneliness in the last month? Yes   Who do you live with? Spouse   If you need help, do you have trouble finding someone available to you? No   Have you been bothered in the last four weeks by sexual problems? No   Do you have difficulty concentrating, remembering or making decisions? Yes           Age-appropriate Screening Schedule:  Refer to the list below for future screening recommendations based on patient's age, sex and/or medical conditions.  Orders for these recommended tests are listed in the plan section. The patient has been provided with a written plan.    Health Maintenance List  Health Maintenance   Topic Date Due    TDAP/TD VACCINES (2 - Td or Tdap) 12/11/2024 (Originally 2/6/2024)    ZOSTER VACCINE (2 of 2) 12/11/2024 (Originally 7/24/2018)    COVID-19 Vaccine (3 - 2024-25 season) 12/13/2024 (Originally 9/1/2024)    MAMMOGRAM  03/11/2025 (Originally 1/24/2024)    INFLUENZA VACCINE  03/31/2025 (Originally 7/1/2024)    Pneumococcal Vaccine 0-64 (2 of 2 - PPSV23 or PCV20) 12/11/2025 (Originally 1/26/2022)    BMI FOLLOWUP  04/30/2025    LIPID PANEL  06/13/2025    ANNUAL WELLNESS VISIT  12/11/2025    PAP SMEAR  01/24/2026    COLORECTAL CANCER SCREENING  03/22/2027    HEPATITIS C SCREENING  Completed                                                                                                                                                CMS Preventative Services Quick Reference  Risk Factors Identified During Encounter  Fall Risk-High or Moderate: Discussed Fall Prevention in the home  Immunizations Discussed/Encouraged: Tdap and Shingrix  Inactivity/Sedentary: Patient was advised to exercise at least 150 minutes a week per CDC recommendations.  Dental Screening Recommended  Vision Screening Recommended    The above risks/problems have been discussed with the patient.  Pertinent information has been shared with the patient in the After Visit Summary.  An After Visit Summary and PPPS were made available to the patient.    Follow Up:   Next Medicare Wellness visit to be scheduled in 1 year.         Additional E&M Note during same encounter follows:  Patient has additional, significant, and separately identifiable condition(s)/problem(s) that require work above and beyond the Medicare Wellness Visit     Chief Complaint  Medicare Wellness-subsequent    Subjective   HPI  Machelle is also being seen today for an annual adult preventative physical exam.  and  "Machelle is also being seen today for additional medical problem/s.    Follows with Dr. Herndon for gynecology needs.   Mammogram-- scheduled for next week    Colonoscopy due in 2027    Reports anxiety and depression-- reports she also has a hx of ADHD; reports she was previously on adderall. Has been off of this since 2022.   Continues on prozac, trazodone, wellbutrin.   Denies any SI.      Hx thoracic schwannoma with removal-- following with Dr. Jarrett.     Hx diaphragmatic hernia without obstruction-- following with Dr. Dunn; under surveillance.   Continues on PPI for GERD.      Following with Dr. Manley for b/l chronic shoulder pain and chronic left hip pain. Had recent injection tx.     Following with Dr. Vieyra with [facility] r/t memory loss-- was thought to be multifactorial; her MOCA 26 at her visit on 7/16/24. Has f/u scheduled for January.      Hx asthma-- uses albuterol as needed; denies any increased symptoms of SOA, CP, KEN.     Continues on CPAP QHS-- sees Dr. Cheng; trying to wear this nightly.     Continues on iron supplementation; hx of anemia.     Objective   Vital Signs:  /86 (BP Location: Left arm, Patient Position: Sitting, Cuff Size: Adult)   Pulse 83   Temp 96.6 °F (35.9 °C) (Temporal)   Ht 162.6 cm (64.02\")   Wt 83.5 kg (184 lb)   SpO2 96%   BMI 31.57 kg/m²   Physical Exam  Constitutional:       Appearance: Normal appearance.   HENT:      Head: Normocephalic and atraumatic.      Right Ear: Tympanic membrane, ear canal and external ear normal.      Left Ear: Tympanic membrane, ear canal and external ear normal.      Nose: Nose normal.      Mouth/Throat:      Mouth: Mucous membranes are moist.      Pharynx: Oropharynx is clear.   Eyes:      Conjunctiva/sclera: Conjunctivae normal.      Pupils: Pupils are equal, round, and reactive to light.      Comments: +glasses   Cardiovascular:      Rate and Rhythm: Normal rate and regular rhythm.      Pulses: Normal pulses.      " Heart sounds: Normal heart sounds. No murmur heard.     No gallop.   Pulmonary:      Effort: Pulmonary effort is normal. No respiratory distress.      Breath sounds: Normal breath sounds. No stridor. No wheezing, rhonchi or rales.   Abdominal:      General: Bowel sounds are normal. There is no distension.      Palpations: Abdomen is soft.      Tenderness: There is no abdominal tenderness.   Musculoskeletal:         General: No swelling. Normal range of motion.      Cervical back: Normal range of motion.   Skin:     General: Skin is warm and dry.      Capillary Refill: Capillary refill takes less than 2 seconds.   Neurological:      Mental Status: She is alert and oriented to person, place, and time. Mental status is at baseline.   Psychiatric:         Mood and Affect: Mood normal.         Thought Content: Thought content normal.         Judgment: Judgment normal.         The following data was reviewed by: GOMEZ Holley on 12/11/2024:  Tobacco Use: Low Risk  (12/11/2024)    Patient History     Smoking Tobacco Use: Never     Smokeless Tobacco Use: Never     Passive Exposure: Not on file     Social History     Substance and Sexual Activity   Alcohol Use Not Currently    Comment: Holidays, socially     Family History   Problem Relation Age of Onset    Alcohol abuse Other     Arthritis Other     Diabetes Other     Drug abuse Other     Hepatitis Other     Hypertension Other     Depression Mother     Lung cancer Mother     Thyroid disease Mother     Arthritis Mother     Cancer Mother         Lung cancer 2016    Diabetes Mother     Hyperlipidemia Mother     Hypertension Mother         AFIB    Miscarriages / Stillbirths Mother     Colon polyps Mother     Anxiety disorder Mother     Mental illness Mother     Asthma Mother     Hashimoto's thyroiditis Daughter     Thyroid disease Daughter         Hashimoto's    Breast cancer Maternal Aunt     Prostate cancer Maternal Uncle     Cancer Maternal Uncle         Thymus and  Pancreatic cancer ()    Heart disease Paternal Grandmother         Congestive heart    Alcohol abuse Paternal Grandmother     Asthma Paternal Grandmother     Depression Paternal Grandmother     Alzheimer's disease Paternal Grandfather     Alcohol abuse Paternal Grandfather     Depression Paternal Grandfather     Alcohol abuse Brother     Drug abuse Brother         Alcoholic for 20 yrs+    Arthritis Brother         Dx with RA within last 3 years    Depression Brother     Drug abuse Brother         Marijuana for chronic pain    Learning disabilities Brother         ADHD    Vision loss Brother         Corrective lenses, Macular Degeneration    Arthritis Maternal Grandmother     Depression Maternal Grandmother     Diabetes Maternal Grandmother     Heart disease Maternal Grandmother         Had an aortic valve replace    Hyperlipidemia Maternal Grandmother     Hypertension Maternal Grandmother     Thyroid disease Maternal Grandmother     Birth defects Maternal Uncle         CP    Hyperlipidemia Maternal Uncle     Cancer Maternal Grandfather         Kidney, bone cancer     Cancer Maternal Aunt         Breast cancer     Depression Maternal Aunt     Miscarriages / Stillbirths Maternal Aunt     Vision loss Maternal Aunt         Corrective lenses    Anxiety disorder Maternal Aunt     Arthritis Maternal Aunt         DX with RA after having COVID    Cancer Maternal Aunt         Lung cancer     Depression Maternal Aunt     Diabetes Maternal Aunt     Cancer Maternal Uncle         Prostate Cancer    Vision loss Maternal Uncle         Corrective lenses    Cancer Maternal Uncle         Thymus and Pancreatic Cancer    Depression Father     Diabetes Father     Hyperlipidemia Father     Colon polyps Father     Vision loss Father         Corrective lenses    Arthritis Father     Hearing loss Father         Hearing loss 2020    Other Father         Dx with Parkinsons     Depression Daughter     Learning  "disabilities Daughter         ADHD    Vision loss Daughter         \" \"    Diabetes Maternal Aunt         Pancreatic Insulinoma removed    Thyroid disease Maternal Aunt     Vision loss Maternal Aunt         \"  \"    Learning disabilities Son         ADHD, SPD    Depression Son         Depression, ADHD, OCD, Anxiety    Other Paternal Uncle         ALS    Malig Hyperthermia Neg Hx                Assessment and Plan Additional age appropriate preventative wellness advice topics were discussed during today's preventative wellness exam(some topics already addressed during AWV portion of the note above):    Physical Activity: Advised cardiovascular activity 150 minutes per week as tolerated. (example brisk walk for 30 minutes, 5 days a week).     Nutrition: Discussed nutrition plan with patient. Information shared in after visit summary. Goal is for a well balanced diet to enhance overall health.     Healthy Weight: Discussed current and goal BMI with patient. Steps to attain this goal discussed. Information shared in after visit summary.           Medicare annual wellness visit, subsequent    Orders:    Comprehensive Metabolic Panel    Annual physical exam  Current recommendations according to the current Physical Activity Guidelines for Americans: adults need 150-300 minutes of physical exercise weekly. It is also recommended to perform two sessions of full body strength training exercise weekly which includes all major muscle groups including legs, hips, back, abdomen, chest, shoulders, and arms.   Current CDC recommendations for diet include following a diet that emphasizes fruits, vegetables, whole grains that is low in saturated fats and low in sugar intake.   Adults should consume at least 3 cup equivalents of fruit and vegetables daily. It is also beneficial to get 25 grams of fiber daily unless told otherwise by your healthcare provider.   Labs today  [facility]  Mammogram-- scheduled " for next week    Colonoscopy due in 2027  Anticipatory guidance given regarding health prevention/wellness, diet/exercise, tobacco/alcohol/drug education, exercise and wellbeing, covid 19 guidance, vaccination recommendations, and sexual health/STD education.   Recommended bi-yearly dental exams and regular vision examinations.            Mixed hyperlipidemia  The 10-year ASCVD risk score (Nidia WALKER, et al., 2019) is: 3.2%    Values used to calculate the score:      Age: 59 years      Sex: Female      Is Non- : No      Diabetic: No      Tobacco smoker: No      Systolic Blood Pressure: 124 mmHg      Is BP treated: No      HDL Cholesterol: 46 mg/dL      Total Cholesterol: 198 mg/dL  Recommend following a low saturated fat, low sugar diet and getting 150 minutes of weekly exercise.       Orders:    Lipid Panel    TSH Rfx On Abnormal To Free T4    Gastroesophageal reflux disease without esophagitis  Continues on PPI       Anemia, unspecified type  On iron  Labs today  Orders:    CBC & Differential    Ferritin    Iron Profile    Vitamin B12 & Folate    Memory loss  Following with [facility]  May benefit from involving Jeanette Blankenship due to hx of anxiety, depression  Denies SI  Orders:    Vitamin B12 & Folate    Mild intermittent asthma, unspecified whether complicated  Has albuterol if needed  Denies SOA, CP, Wheezing       History of benign schwannoma  Following with neurosurgery          Mixed stress and urge urinary incontinence  Continues on vesicare         Diaphragmatic hernia without obstruction and without gangrene  FINDINGS: Stable postoperative changes along the right hemidiaphragm  with stable 3 cm rounded density at the right lung base. Stable bands of  scarring in the right lung base. No lymphadenopathy or pleural effusion.  Calcified mediastinal and hilar lymph nodes. Limited imaging of the  upper abdomen is unremarkable. No acute bony abnormality. Postop change  thoracic  spine.     IMPRESSION:  Stable postop changes of the right hemidiaphragm           Radiation dose reduction techniques were utilized, including automated  exposure control and exposure modulation based on body size.        This report was finalized on 11/16/2023 12:42 PM by Dr. Yang Daniel M.D on Workstation: [id number]    Following with Dr. Dunn          Obstructive sleep apnea syndrome  Continues on CPAP QHS  Following with sleep medicine         Anxiety  Continues on wellbutrin, prozac  Denies SI         Attention deficit disorder (ADD) without hyperactivity  Has been off medication since 2022  Denies SI         Monopolar depression  Continues on wellbutrin, prozac   Denies SI         Chronic pain of both shoulders  Continues following with orthopedics         Impaired fasting glucose    Orders:    Hemoglobin A1c            Follow Up   Return in about 6 months (around 6/11/2025) for Recheck chronic conditions.  Patient was given instructions and counseling regarding her condition or for health maintenance advice. Please see specific information pulled into the AVS if appropriate.

## 2024-12-11 NOTE — ASSESSMENT & PLAN NOTE
On iron  Labs today  Orders:    CBC & Differential    Ferritin    Iron Profile    Vitamin B12 & Folate

## 2024-12-11 NOTE — ASSESSMENT & PLAN NOTE
The 10-year ASCVD risk score (Nidia WALKER, et al., 2019) is: 3.2%    Values used to calculate the score:      Age: 59 years      Sex: Female      Is Non- : No      Diabetic: No      Tobacco smoker: No      Systolic Blood Pressure: 124 mmHg      Is BP treated: No      HDL Cholesterol: 46 mg/dL      Total Cholesterol: 198 mg/dL  Recommend following a low saturated fat, low sugar diet and getting 150 minutes of weekly exercise.       Orders:    Lipid Panel    TSH Rfx On Abnormal To Free T4

## 2024-12-11 NOTE — ASSESSMENT & PLAN NOTE
Current recommendations according to the current Physical Activity Guidelines for Americans: adults need 150-300 minutes of physical exercise weekly. It is also recommended to perform two sessions of full body strength training exercise weekly which includes all major muscle groups including legs, hips, back, abdomen, chest, shoulders, and arms.   Current CDC recommendations for diet include following a diet that emphasizes fruits, vegetables, whole grains that is low in saturated fats and low in sugar intake.   Adults should consume at least 3 cup equivalents of fruit and vegetables daily. It is also beneficial to get 25 grams of fiber daily unless told otherwise by your healthcare provider.   Labs today  [facility]  Mammogram-- scheduled for next week    Colonoscopy due in 2027  Anticipatory guidance given regarding health prevention/wellness, diet/exercise, tobacco/alcohol/drug education, exercise and wellbeing, covid 19 guidance, vaccination recommendations, and sexual health/STD education.   Recommended bi-yearly dental exams and regular vision examinations.

## 2024-12-11 NOTE — ASSESSMENT & PLAN NOTE
FINDINGS: Stable postoperative changes along the right hemidiaphragm  with stable 3 cm rounded density at the right lung base. Stable bands of  scarring in the right lung base. No lymphadenopathy or pleural effusion.  Calcified mediastinal and hilar lymph nodes. Limited imaging of the  upper abdomen is unremarkable. No acute bony abnormality. Postop change  thoracic spine.     IMPRESSION:  Stable postop changes of the right hemidiaphragm           Radiation dose reduction techniques were utilized, including automated  exposure control and exposure modulation based on body size.        This report was finalized on 11/16/2023 12:42 PM by Dr. Yang Daniel M.D on Workstation: [id number]    Following with Dr. Dunn

## 2024-12-12 ENCOUNTER — TELEPHONE (OUTPATIENT)
Dept: INTERNAL MEDICINE | Facility: CLINIC | Age: 59
End: 2024-12-12
Payer: MEDICARE

## 2024-12-12 DIAGNOSIS — E78.2 MIXED HYPERLIPIDEMIA: Primary | ICD-10-CM

## 2024-12-12 NOTE — PROGRESS NOTES
Please let patient know--  Her cholesterol came back very much elevated-- her LDL is at 189 and this is concerning. I would like to recheck this again in 3 months. Please schedule lab exam. If her LDL is still elevated, I'd like for us to start a medication to get this level lower.   CBC is stable, liver and kidney function is stable  Her thyroid function is stable  Her b12 is borderline-- she should be taking 1000mcg of b12 daily  I would also like her to continue on her iron 3x weekly with lunch

## 2024-12-12 NOTE — TELEPHONE ENCOUNTER
"Relay     \" Your cholesterol came back very much elevated -- LDL is at 189 and this is concerning. I would like to recheck this again in 3 months. Please schedule lab exam. If your LDL is still elevated, I'd like for us to start a medication to get this level lower.   CBC is stable, liver and kidney function is stable  Thyroid function is stable  B12 is borderline -- should be taking 1000mcg of b12 daily  Ira also wants you to continue on the iron 3x weekly with lunch\"    Hub transfer call to the office for patient to schedule lab appointment   "

## 2024-12-19 ENCOUNTER — APPOINTMENT (OUTPATIENT)
Dept: WOMENS IMAGING | Facility: HOSPITAL | Age: 59
End: 2024-12-19
Payer: MEDICARE

## 2024-12-19 PROCEDURE — 76642 ULTRASOUND BREAST LIMITED: CPT | Performed by: RADIOLOGY

## 2024-12-19 PROCEDURE — G0279 TOMOSYNTHESIS, MAMMO: HCPCS | Performed by: RADIOLOGY

## 2024-12-19 PROCEDURE — 77066 DX MAMMO INCL CAD BI: CPT | Performed by: RADIOLOGY

## 2025-01-09 ENCOUNTER — APPOINTMENT (OUTPATIENT)
Dept: WOMENS IMAGING | Facility: HOSPITAL | Age: 60
End: 2025-01-09
Payer: COMMERCIAL

## 2025-01-09 ENCOUNTER — LAB REQUISITION (OUTPATIENT)
Dept: LAB | Facility: HOSPITAL | Age: 60
End: 2025-01-09
Payer: COMMERCIAL

## 2025-01-09 ENCOUNTER — HOSPITAL ENCOUNTER (OUTPATIENT)
Dept: MAMMOGRAPHY | Facility: HOSPITAL | Age: 60
Discharge: HOME | End: 2025-01-09
Payer: COMMERCIAL

## 2025-01-09 DIAGNOSIS — N64.89 BREAST ASYMMETRY: ICD-10-CM

## 2025-01-09 DIAGNOSIS — N64.89 OTHER SPECIFIED DISORDERS OF BREAST: ICD-10-CM

## 2025-01-09 PROCEDURE — 76098 X-RAY EXAM SURGICAL SPECIMEN: CPT

## 2025-01-09 PROCEDURE — 19081 BX BREAST 1ST LESION STRTCTC: CPT | Performed by: RADIOLOGY

## 2025-01-09 PROCEDURE — 88305 TISSUE EXAM BY PATHOLOGIST: CPT | Performed by: OBSTETRICS & GYNECOLOGY

## 2025-01-09 PROCEDURE — C1819 TISSUE LOCALIZATION-EXCISION: HCPCS | Performed by: RADIOLOGY

## 2025-01-09 PROCEDURE — A4648 IMPLANTABLE TISSUE MARKER: HCPCS | Performed by: RADIOLOGY

## 2025-01-14 LAB
CYTO UR: NORMAL
DX PRELIMINARY: NORMAL
LAB AP CASE REPORT: NORMAL
LAB AP FLOW CYTOMETRY SUMMARY: NORMAL
PATH REPORT.FINAL DX SPEC: NORMAL
PATH REPORT.GROSS SPEC: NORMAL

## 2025-01-27 ENCOUNTER — TELEPHONE (OUTPATIENT)
Dept: INTERNAL MEDICINE | Facility: CLINIC | Age: 60
End: 2025-01-27
Payer: MEDICARE

## 2025-01-27 NOTE — TELEPHONE ENCOUNTER
Hub staff attempted to follow warm transfer process and was unsuccessful     Caller: Machelle Ortega    Relationship to patient: Self    Best call back number: [phone]     Patient is needing: PATIENT  IS REQUESTING TO DROP PRE OP CLEARANCE FORM FOR SURGERY ON FEB 3RD. PLEASE CALL PATIENT  DISCUSS.

## 2025-01-27 NOTE — TELEPHONE ENCOUNTER
Patient is having reverse shoulder placement surgery and just needs your signature to clear her for surgery.     Patient also requested for you to sign handicap paperwork for her to be able to park her golf cart near the water near [facility]

## 2025-01-29 ENCOUNTER — OFFICE VISIT (OUTPATIENT)
Dept: INTERNAL MEDICINE | Facility: CLINIC | Age: 60
End: 2025-01-29
Payer: MEDICARE

## 2025-01-29 VITALS
BODY MASS INDEX: 32.44 KG/M2 | HEIGHT: 64 IN | HEART RATE: 85 BPM | SYSTOLIC BLOOD PRESSURE: 122 MMHG | OXYGEN SATURATION: 97 % | WEIGHT: 190 LBS | DIASTOLIC BLOOD PRESSURE: 90 MMHG

## 2025-01-29 DIAGNOSIS — F51.01 PRIMARY INSOMNIA: Chronic | ICD-10-CM

## 2025-01-29 DIAGNOSIS — J30.9 ALLERGIC SINUSITIS: Chronic | ICD-10-CM

## 2025-01-29 DIAGNOSIS — Z01.818 PREOPERATIVE CLEARANCE: Primary | ICD-10-CM

## 2025-01-29 DIAGNOSIS — F41.9 ANXIETY: Chronic | ICD-10-CM

## 2025-01-29 DIAGNOSIS — N39.46 MIXED STRESS AND URGE URINARY INCONTINENCE: Chronic | ICD-10-CM

## 2025-01-29 DIAGNOSIS — K21.9 GASTROESOPHAGEAL REFLUX DISEASE WITHOUT ESOPHAGITIS: Chronic | ICD-10-CM

## 2025-01-29 PROCEDURE — 1125F AMNT PAIN NOTED PAIN PRSNT: CPT | Performed by: NURSE PRACTITIONER

## 2025-01-29 PROCEDURE — 99213 OFFICE O/P EST LOW 20 MIN: CPT | Performed by: NURSE PRACTITIONER

## 2025-01-29 PROCEDURE — 1160F RVW MEDS BY RX/DR IN RCRD: CPT | Performed by: NURSE PRACTITIONER

## 2025-01-29 PROCEDURE — 1159F MED LIST DOCD IN RCRD: CPT | Performed by: NURSE PRACTITIONER

## 2025-01-29 RX ORDER — PANTOPRAZOLE SODIUM 40 MG/1
40 TABLET, DELAYED RELEASE ORAL DAILY
Qty: 90 TABLET | Refills: 1 | Status: SHIPPED | OUTPATIENT
Start: 2025-01-29

## 2025-01-29 RX ORDER — BUPROPION HYDROCHLORIDE 150 MG/1
150 TABLET ORAL EVERY MORNING
Qty: 90 TABLET | Refills: 1 | Status: SHIPPED | OUTPATIENT
Start: 2025-01-29

## 2025-01-29 RX ORDER — ALBUTEROL SULFATE 90 UG/1
2 INHALANT RESPIRATORY (INHALATION) EVERY 4 HOURS PRN
Qty: 6.7 G | Refills: 1 | Status: SHIPPED | OUTPATIENT
Start: 2025-01-29

## 2025-01-29 RX ORDER — SOLIFENACIN SUCCINATE 10 MG/1
10 TABLET, FILM COATED ORAL DAILY
Qty: 90 TABLET | Refills: 1 | Status: SHIPPED | OUTPATIENT
Start: 2025-01-29

## 2025-01-29 RX ORDER — TRAZODONE HYDROCHLORIDE 100 MG/1
200 TABLET ORAL NIGHTLY
Qty: 180 TABLET | Refills: 1 | Status: SHIPPED | OUTPATIENT
Start: 2025-01-29

## 2025-01-29 RX ORDER — GUAIFENESIN 600 MG/1
1200 TABLET, EXTENDED RELEASE ORAL 2 TIMES DAILY
COMMUNITY

## 2025-01-29 NOTE — ASSESSMENT & PLAN NOTE
Paperwork filled out at visit today.   In my professional opinion, patient is low risk to proceed with surgery.   No red flags on exam today; not currently on any sort of blood thinning medication.

## 2025-01-29 NOTE — PROGRESS NOTES
"Chief Complaint  No chief complaint on file.    Subjective        Machelle Ortega presents to [facility]  History of Present Illness  This is a 60 y/o female presenting to office for medical clearance for her right reverse shoulder procedure scheduled 2/3/25. She is having this through [facility]. She is currently not on any aspirin or blood thinning medication. Denies any chest pain or shortness of breath. Reports she is looking forward to her surgery and recovery. Her recent lab work did not show any gross anemia. Her A1c is at 6.5. She is also requesting refill of medications.     Objective   Vital Signs:  /90 (BP Location: Left arm, Patient Position: Sitting, Cuff Size: Adult)   Pulse 85   Ht 162.6 cm (64.02\")   Wt 86.2 kg (190 lb)   SpO2 97%   BMI 32.59 kg/m²   Estimated body mass index is 32.59 kg/m² as calculated from the following:    Height as of this encounter: 162.6 cm (64.02\").    Weight as of this encounter: 86.2 kg (190 lb).            Physical Exam  Constitutional:       Appearance: Normal appearance.   HENT:      Head: Normocephalic and atraumatic.      Right Ear: External ear normal.      Left Ear: External ear normal.      Nose: Nose normal.      Mouth/Throat:      Mouth: Mucous membranes are moist.      Pharynx: Oropharynx is clear.   Eyes:      Pupils: Pupils are equal, round, and reactive to light.   Cardiovascular:      Rate and Rhythm: Normal rate and regular rhythm.      Pulses: Normal pulses.      Heart sounds: Normal heart sounds. No murmur heard.     No gallop.   Pulmonary:      Effort: Pulmonary effort is normal. No respiratory distress.      Breath sounds: Normal breath sounds. No stridor. No wheezing, rhonchi or rales.   Musculoskeletal:      Cervical back: Normal range of motion and neck supple.   Skin:     General: Skin is warm and dry.      Capillary Refill: Capillary refill takes less than 2 seconds.   Neurological:      Mental Status: " She is alert and oriented to person, place, and time. Mental status is at baseline.   Psychiatric:         Mood and Affect: Mood normal.         Thought Content: Thought content normal.         Judgment: Judgment normal.        Result Review :  The following data was reviewed by: GOMEZ Holley on 2025:  Common labs          2024    08:37 2024    10:02   Common Labs   Glucose 92  114    BUN 21  10    Creatinine 0.86  0.74    Sodium 142  140    Potassium 4.6  4.9    Chloride 104  104    Calcium 9.6  10.2    Total Protein 6.4  7.5    Albumin 4.1  4.6    Total Bilirubin <0.2  0.3    Alkaline Phosphatase 117  117    AST (SGOT) 17  30    ALT (SGPT) 11  28    WBC 3.9  8.37    Hemoglobin 12.0  13.8    Hematocrit 36.5  43.1    Platelets 262  347    Total Cholesterol 198  268    Triglycerides 157  68    HDL Cholesterol 46  68    LDL Cholesterol  124  189    Hemoglobin A1C  5.40      Tobacco Use: Low Risk  (2025)    Patient History     Smoking Tobacco Use: Never     Smokeless Tobacco Use: Never     Passive Exposure: Not on file     Social History     Substance and Sexual Activity   Alcohol Use Not Currently    Comment: Holidays, socially     Family History   Problem Relation Age of Onset    Alcohol abuse Other     Arthritis Other     Diabetes Other     Drug abuse Other     Hepatitis Other     Hypertension Other     Depression Mother     Lung cancer Mother     Thyroid disease Mother     Arthritis Mother     Cancer Mother         Lung cancer 2016    Diabetes Mother     Hyperlipidemia Mother     Hypertension Mother         AFIB    Miscarriages / Stillbirths Mother     Colon polyps Mother     Anxiety disorder Mother     Mental illness Mother     Asthma Mother     Hashimoto's thyroiditis Daughter     Thyroid disease Daughter         Hashimoto's    Breast cancer Maternal Aunt     Prostate cancer Maternal Uncle     Cancer Maternal Uncle         Thymus and Pancreatic cancer ()    Heart disease  Paternal Grandmother         Congestive heart    Alcohol abuse Paternal Grandmother     Asthma Paternal Grandmother     Depression Paternal Grandmother     Alzheimer's disease Paternal Grandfather     Alcohol abuse Paternal Grandfather     Depression Paternal Grandfather     Alcohol abuse Brother     Drug abuse Brother         Alcoholic for 20 yrs+    Arthritis Brother         Dx with RA within last 3 years    Depression Brother     Drug abuse Brother         Marijuana for chronic pain    Learning disabilities Brother         ADHD    Vision loss Brother         Corrective lenses, Macular Degeneration    Arthritis Maternal Grandmother     Depression Maternal Grandmother     Diabetes Maternal Grandmother     Heart disease Maternal Grandmother         Had an aortic valve replace    Hyperlipidemia Maternal Grandmother     Hypertension Maternal Grandmother     Thyroid disease Maternal Grandmother     Birth defects Maternal Uncle         CP    Hyperlipidemia Maternal Uncle     Cancer Maternal Grandfather         Kidney, bone cancer 1980's    Cancer Maternal Aunt         Breast cancer 2003    Depression Maternal Aunt     Miscarriages / Stillbirths Maternal Aunt     Vision loss Maternal Aunt         Corrective lenses    Anxiety disorder Maternal Aunt     Arthritis Maternal Aunt         DX with RA after having COVID    Cancer Maternal Aunt         Lung cancer 2015    Depression Maternal Aunt     Diabetes Maternal Aunt     Cancer Maternal Uncle         Prostate Cancer    Vision loss Maternal Uncle         Corrective lenses    Cancer Maternal Uncle         Thymus and Pancreatic Cancer    Depression Father     Diabetes Father     Hyperlipidemia Father     Colon polyps Father     Vision loss Father         Corrective lenses    Arthritis Father     Hearing loss Father         Hearing loss 2020    Other Father         Dx with Parkinsons 2022    Depression Daughter     Learning disabilities Daughter         ADHD    Vision loss  "Daughter         \" \"    Diabetes Maternal Aunt         Pancreatic Insulinoma removed    Thyroid disease Maternal Aunt     Vision loss Maternal Aunt         \"  \"    Learning disabilities Son         ADHD, SPD    Depression Son         Depression, ADHD, OCD, Anxiety    Other Paternal Uncle         ALS    Malalex Hyperthermia Neg Hx      CBC AND DIFFERENTIAL (01/07/2025 3:54 PM)  AUTODIFF (01/07/2025 3:54 PM)  PROTIME-INR (01/07/2025 3:54 PM)  URINALYSIS COMP W CULTURE IF INDICATED (01/07/2025 3:54 PM)  COMPREHENSIVE METABOLIC PANEL (01/07/2025 3:54 PM)  HEMOGLOBIN A1C (01/07/2025 3:54 PM)         Assessment and Plan   Diagnoses and all orders for this visit:    1. Preoperative clearance (Primary)  Assessment & Plan:  Paperwork filled out at visit today.   In my professional opinion, patient is low risk to proceed with surgery.   No red flags on exam today; not currently on any sort of blood thinning medication.       2. Gastroesophageal reflux disease without esophagitis  -     pantoprazole (PROTONIX) 40 MG EC tablet; Take 1 tablet by mouth Daily.  Dispense: 90 tablet; Refill: 1    3. Allergic sinusitis  -     albuterol sulfate  (90 Base) MCG/ACT inhaler; Inhale 2 puffs Every 4 (Four) Hours As Needed for Wheezing.  Dispense: 6.7 g; Refill: 1    4. Anxiety  -     buPROPion XL (WELLBUTRIN XL) 150 MG 24 hr tablet; Take 1 tablet by mouth Every Morning.  Dispense: 90 tablet; Refill: 1  -     FLUoxetine (PROzac) 20 MG capsule; Take 3 capsules by mouth Every Night.  Dispense: 270 capsule; Refill: 1    5. Mixed stress and urge urinary incontinence  -     solifenacin (VESICARE) 10 MG tablet; Take 1 tablet by mouth Daily.  Dispense: 90 tablet; Refill: 1    6. Primary insomnia  -     traZODone (DESYREL) 100 MG tablet; Take 2 tablets by mouth Every Night.  Dispense: 180 tablet; Refill: 1    Other orders  -     Diclofenac Sodium (VOLTAREN) 1 % gel gel; Apply 4 g topically to the appropriate area as directed 2 (Two) Times a " Day As Needed (for RLE pain).  Dispense: 350 g; Refill: 1             Follow Up   Return for Next scheduled follow up 6/11/25.  Patient was given instructions and counseling regarding her condition or for health maintenance advice. Please see specific information pulled into the AVS if appropriate.

## 2025-04-04 ENCOUNTER — OFFICE VISIT (OUTPATIENT)
Dept: INTERNAL MEDICINE | Facility: CLINIC | Age: 60
End: 2025-04-04
Payer: COMMERCIAL

## 2025-04-04 VITALS
HEIGHT: 64 IN | OXYGEN SATURATION: 97 % | BODY MASS INDEX: 31.58 KG/M2 | DIASTOLIC BLOOD PRESSURE: 75 MMHG | SYSTOLIC BLOOD PRESSURE: 125 MMHG | HEART RATE: 111 BPM | WEIGHT: 185 LBS | TEMPERATURE: 103 F

## 2025-04-04 DIAGNOSIS — J45.20 MILD INTERMITTENT ASTHMA, UNSPECIFIED WHETHER COMPLICATED: ICD-10-CM

## 2025-04-04 DIAGNOSIS — U07.1 COVID-19: Primary | ICD-10-CM

## 2025-04-04 LAB
EXPIRATION DATE: ABNORMAL
FLUAV AG UPPER RESP QL IA.RAPID: NOT DETECTED
FLUBV AG UPPER RESP QL IA.RAPID: NOT DETECTED
INTERNAL CONTROL: ABNORMAL
Lab: ABNORMAL
SARS-COV-2 AG UPPER RESP QL IA.RAPID: DETECTED

## 2025-04-04 PROCEDURE — 99213 OFFICE O/P EST LOW 20 MIN: CPT | Performed by: NURSE PRACTITIONER

## 2025-04-04 PROCEDURE — 87428 SARSCOV & INF VIR A&B AG IA: CPT | Performed by: NURSE PRACTITIONER

## 2025-04-04 RX ORDER — DEXTROMETHORPHAN HYDROBROMIDE AND PROMETHAZINE HYDROCHLORIDE 15; 6.25 MG/5ML; MG/5ML
5 SYRUP ORAL 4 TIMES DAILY PRN
Qty: 240 ML | Refills: 0 | Status: SHIPPED | OUTPATIENT
Start: 2025-04-04

## 2025-04-04 RX ORDER — ALBUTEROL SULFATE 1.25 MG/3ML
1 SOLUTION RESPIRATORY (INHALATION) EVERY 6 HOURS PRN
Qty: 30 EACH | Refills: 4 | Status: SHIPPED | OUTPATIENT
Start: 2025-04-04

## 2025-04-04 NOTE — PROGRESS NOTES
"Chief Complaint  Cough, Headache, Generalized Body Aches, and Leg Pain (Left)    Subjective        Machelle Ortega presents to [facility]  History of Present Illness  This is a 60 y/o female presenting to office for complaints of cough, h/a, body aches, leg cramps. Reports symptoms started about 24-36 hours ago. Reports she is taking aleve and cough/cold OTC medication. Denies n/v/d. Denies SOA, CP. Needs refill on albuterol nebulizer solution.     Objective   Vital Signs:  /75 (BP Location: Left arm, Patient Position: Sitting, Cuff Size: Adult)   Pulse 111   Temp (!) 103 °F (39.4 °C) (Oral)   Ht 162.6 cm (64.02\")   Wt 83.9 kg (185 lb)   SpO2 97%   BMI 31.74 kg/m²   Estimated body mass index is 31.74 kg/m² as calculated from the following:    Height as of this encounter: 162.6 cm (64.02\").    Weight as of this encounter: 83.9 kg (185 lb).            Physical Exam  Constitutional:       General: She is awake.      Appearance: Normal appearance.   HENT:      Head: Normocephalic and atraumatic.      Right Ear: Hearing, tympanic membrane, ear canal and external ear normal.      Left Ear: Hearing, tympanic membrane, ear canal and external ear normal.      Nose: Congestion present.      Mouth/Throat:      Lips: Pink.      Mouth: Mucous membranes are moist.      Pharynx: Oropharynx is clear.   Eyes:      Extraocular Movements: Extraocular movements intact.      Conjunctiva/sclera: Conjunctivae normal.      Pupils: Pupils are equal, round, and reactive to light.   Cardiovascular:      Rate and Rhythm: Normal rate and regular rhythm.      Pulses: Normal pulses.      Heart sounds: Normal heart sounds. No murmur heard.     No friction rub. No gallop.   Pulmonary:      Effort: Pulmonary effort is normal. No respiratory distress.      Breath sounds: Normal breath sounds. No stridor. No wheezing, rhonchi or rales.   Musculoskeletal:      Cervical back: Normal range of motion and neck " supple.   Skin:     General: Skin is warm and dry.      Capillary Refill: Capillary refill takes less than 2 seconds.   Neurological:      General: No focal deficit present.      Mental Status: She is alert and oriented to person, place, and time. Mental status is at baseline.      Motor: Motor function is intact.      Coordination: Coordination is intact.      Gait: Gait is intact.      Deep Tendon Reflexes: Reflexes are normal and symmetric.   Psychiatric:         Attention and Perception: Attention normal.         Mood and Affect: Mood normal.         Speech: Speech normal.         Behavior: Behavior normal. Behavior is cooperative.         Thought Content: Thought content normal.         Cognition and Memory: Cognition normal.         Judgment: Judgment normal.        Result Review :  The following data was reviewed by: GOMEZ Holley on 04/04/2025:  Common labs          6/13/2024    08:37 12/11/2024    10:02   Common Labs   Glucose 92  114    BUN 21  10    Creatinine 0.86  0.74    Sodium 142  140    Potassium 4.6  4.9    Chloride 104  104    Calcium 9.6  10.2    Albumin 4.1  4.6    Total Bilirubin <0.2  0.3    Alkaline Phosphatase 117  117    AST (SGOT) 17  30    ALT (SGPT) 11  28    WBC 3.9  8.37    Hemoglobin 12.0  13.8    Hematocrit 36.5  43.1    Platelets 262  347    Total Cholesterol 198  268    Triglycerides 157  68    HDL Cholesterol 46  68    LDL Cholesterol  124  189    Hemoglobin A1C  5.40      Tobacco Use: Low Risk  (4/4/2025)    Patient History     Smoking Tobacco Use: Never     Smokeless Tobacco Use: Never     Passive Exposure: Not on file     Social History     Substance and Sexual Activity   Alcohol Use Not Currently    Comment: Holidays, socially     Family History   Problem Relation Age of Onset    Alcohol abuse Other     Arthritis Other     Diabetes Other     Drug abuse Other     Hepatitis Other     Hypertension Other     Depression Mother     Lung cancer Mother     Thyroid disease Mother      Arthritis Mother     Cancer Mother         Lung cancer 2016    Diabetes Mother     Hyperlipidemia Mother     Hypertension Mother         AFIB    Miscarriages / Stillbirths Mother     Colon polyps Mother     Anxiety disorder Mother     Mental illness Mother     Asthma Mother     COPD Mother     Alzheimer's disease Mother     GI problems Mother         IBS, diverticulouses    Hashimoto's thyroiditis Daughter     Thyroid disease Daughter         Hashimoto's    Breast cancer Maternal Aunt     Prostate cancer Maternal Uncle     Cancer Maternal Uncle         Thymus and Pancreatic cancer ()    Heart disease Paternal Grandmother         Congestive heart    Alcohol abuse Paternal Grandmother     Asthma Paternal Grandmother     Depression Paternal Grandmother     Alzheimer's disease Paternal Grandfather     Alcohol abuse Paternal Grandfather     Depression Paternal Grandfather     Alcohol abuse Brother     Drug abuse Brother         Alcoholic for 20 yrs+    Arthritis Brother         Dx with RA within last 3 years    Depression Brother     Drug abuse Brother         Marijuana for chronic pain    Learning disabilities Brother         ADHD    Vision loss Brother         Corrective lenses, Macular Degeneration    Arthritis Maternal Grandmother     Depression Maternal Grandmother     Diabetes Maternal Grandmother     Heart disease Maternal Grandmother         Had an aortic valve replace    Hyperlipidemia Maternal Grandmother     Hypertension Maternal Grandmother     Thyroid disease Maternal Grandmother     Heart valve disorder Maternal Grandmother     Birth defects Maternal Uncle         CP    Hyperlipidemia Maternal Uncle     Cancer Maternal Grandfather         Kidney, bone cancer 1980's    Cancer Maternal Aunt         Breast cancer 2003    Depression Maternal Aunt     Miscarriages / Stillbirths Maternal Aunt     Vision loss Maternal Aunt         Corrective lenses    Anxiety disorder Maternal Aunt     Arthritis  "Maternal Aunt         DX with RA after having COVID    Cancer Maternal Aunt         Lung cancer 2015    Depression Maternal Aunt     Diabetes Maternal Aunt     Cancer Maternal Uncle         Prostate Cancer    Vision loss Maternal Uncle         Corrective lenses    Cancer Maternal Uncle         Thymus and Pancreatic Cancer    Depression Father     Diabetes Father     Hyperlipidemia Father     Colon polyps Father     Vision loss Father         Corrective lenses    Arthritis Father     Hearing loss Father         Hearing loss 2020    Depression Daughter     Learning disabilities Daughter         ADHD    Vision loss Daughter         \" \"    Diabetes Maternal Aunt         Pancreatic Insulinoma removed    Thyroid disease Maternal Aunt     Vision loss Maternal Aunt         \"  \"    Learning disabilities Son         ADHD, SPD    Depression Son         Depression, ADHD, OCD, Anxiety    Depression Daughter     Learning disabilities Daughter         ADHD    Vision loss Daughter         \" \"    Asthma Daughter         Allergy induced    Thyroid disease Daughter         Hashimoto    Cancer Daughter     Cancer Maternal Uncle         Thymus and Pancreatic Cancer    Cancer Maternal Aunt         Lung cancer 2015    Depression Maternal Aunt     Diabetes Maternal Aunt     Thyroid disease Maternal Aunt     Diabetes Maternal Aunt         Pancreatic Insulinoma removed    Thyroid disease Maternal Aunt     Vision loss Maternal Aunt         \"  \"    Birth defects Maternal Uncle         CP    Hyperlipidemia Maternal Uncle     Malig Hyperthermia Neg Hx       POCT SARS-CoV-2 Antigen HERMES + Flu (04/04/2025 4:05 PM)          Assessment and Plan   Diagnoses and all orders for this visit:    1. COVID-19 (Primary)  -     POCT SARS-CoV-2 Antigen HERMES + Flu  -     promethazine-dextromethorphan (PROMETHAZINE-DM) 6.25-15 MG/5ML syrup; Take 5 mL by mouth 4 (Four) Times a Day As Needed for Cough.  Dispense: 240 mL; Refill: 0    2. Mild intermittent asthma, " unspecified whether complicated  -     albuterol (ACCUNEB) 1.25 MG/3ML nebulizer solution; Take 3 mL by nebulization Every 6 (Six) Hours As Needed for Wheezing.  Dispense: 30 each; Refill: 4    Promethazine DM to use PRN at night to help with cough  Albuterol nebulizers PRN; Lungs CTA on exam today   We discussed use of paxlovid but patient would prefer not at this time; it does interact with her vesicare as well.   Recommend to take Mucinex 1200mg BID as needed for cough and congestion.  You can also try Flonase Allergy 2 sprays to each nostril once a day for congestion.  You can also try salt water nose spray for stuffy nose.  Take over-the-counter acetaminophen or ibuprofen as needed for pain or fever.  You can try throat lozenges or salt water gargles for any sore throat.   Your symptoms may take up to 10-14 days to fully subside.  Recommend symptom management support including warm fluids, honey if not diabetic, and cough drops.   Contact us if you have worsening symptoms such as high fever (102F), shortness of breath, inability to keep liquids and solids down, or other worsening symptoms.   Advised of current CDC isolation guidelines           Follow Up   Return if symptoms worsen or fail to improve.  Patient was given instructions and counseling regarding her condition or for health maintenance advice. Please see specific information pulled into the AVS if appropriate.

## 2025-05-05 ENCOUNTER — TELEPHONE (OUTPATIENT)
Dept: NEUROSURGERY | Facility: CLINIC | Age: 60
End: 2025-05-05
Payer: MEDICARE

## 2025-05-05 NOTE — TELEPHONE ENCOUNTER
Caller: KAUSHAL    Relationship to patient: SELF    Best call back number: [phone]    Chief complaint: LEG PAIN    Type of visit: FOLLOW UP     Requested date: F./A     If rescheduling, when is the original appointment:      Additional notes:PATIENT CALLED STATING THE PAIN IN HER LEFT LEG NOW GOES UP THE FRONT OF HER LEG AND BACK DOWN THE BACK SIDE TO HER LEFT KNEE    PATIENT WAS LAST SEEN 12.18.2023 BUT DID NOT COMPLETED EMG/NCT      HAS HAD TO HIP INJECTIONS BUT ITS NOT WORKING     PLEASE ADVISE  THANK YOU

## 2025-05-06 NOTE — TELEPHONE ENCOUNTER
Can we ask her if she is having any numbness, tingling or weakness in the legs.  Also if she having any gait instability, saddle paresthesia or bowel/bladder issues?    Can we ascertain the reason she did not complete her EMG?

## 2025-06-06 ENCOUNTER — OFFICE VISIT (OUTPATIENT)
Dept: INTERNAL MEDICINE | Facility: CLINIC | Age: 60
End: 2025-06-06
Payer: MEDICARE

## 2025-06-06 VITALS
WEIGHT: 187 LBS | HEART RATE: 56 BPM | OXYGEN SATURATION: 96 % | BODY MASS INDEX: 31.92 KG/M2 | DIASTOLIC BLOOD PRESSURE: 80 MMHG | SYSTOLIC BLOOD PRESSURE: 130 MMHG | HEIGHT: 64 IN

## 2025-06-06 DIAGNOSIS — K21.9 GASTROESOPHAGEAL REFLUX DISEASE WITHOUT ESOPHAGITIS: Primary | Chronic | ICD-10-CM

## 2025-06-06 LAB
ALBUMIN SERPL-MCNC: 4.1 G/DL (ref 3.5–5.2)
ALBUMIN/GLOB SERPL: 2 G/DL
ALP SERPL-CCNC: 98 U/L (ref 39–117)
ALT SERPL-CCNC: 15 U/L (ref 1–33)
AST SERPL-CCNC: 16 U/L (ref 1–32)
BASOPHILS # BLD AUTO: 0.04 10*3/MM3 (ref 0–0.2)
BASOPHILS NFR BLD AUTO: 1 % (ref 0–1.5)
BILIRUB SERPL-MCNC: 0.3 MG/DL (ref 0–1.2)
BUN SERPL-MCNC: 18 MG/DL (ref 6–20)
BUN/CREAT SERPL: 25.4 (ref 7–25)
CALCIUM SERPL-MCNC: 9.7 MG/DL (ref 8.6–10.5)
CHLORIDE SERPL-SCNC: 106 MMOL/L (ref 98–107)
CO2 SERPL-SCNC: 26.2 MMOL/L (ref 22–29)
CREAT SERPL-MCNC: 0.71 MG/DL (ref 0.57–1)
EGFRCR SERPLBLD CKD-EPI 2021: 98.1 ML/MIN/1.73
EOSINOPHIL # BLD AUTO: 0.1 10*3/MM3 (ref 0–0.4)
EOSINOPHIL NFR BLD AUTO: 2.5 % (ref 0.3–6.2)
ERYTHROCYTE [DISTWIDTH] IN BLOOD BY AUTOMATED COUNT: 12.7 % (ref 12.3–15.4)
GLOBULIN SER CALC-MCNC: 2.1 GM/DL
GLUCOSE SERPL-MCNC: 84 MG/DL (ref 65–99)
HCT VFR BLD AUTO: 34.7 % (ref 34–46.6)
HGB BLD-MCNC: 11.5 G/DL (ref 12–15.9)
IMM GRANULOCYTES # BLD AUTO: 0.01 10*3/MM3 (ref 0–0.05)
IMM GRANULOCYTES NFR BLD AUTO: 0.2 % (ref 0–0.5)
LIPASE SERPL-CCNC: 24 U/L (ref 13–60)
LYMPHOCYTES # BLD AUTO: 1.21 10*3/MM3 (ref 0.7–3.1)
LYMPHOCYTES NFR BLD AUTO: 30.2 % (ref 19.6–45.3)
MCH RBC QN AUTO: 28.8 PG (ref 26.6–33)
MCHC RBC AUTO-ENTMCNC: 33.1 G/DL (ref 31.5–35.7)
MCV RBC AUTO: 87 FL (ref 79–97)
MONOCYTES # BLD AUTO: 0.3 10*3/MM3 (ref 0.1–0.9)
MONOCYTES NFR BLD AUTO: 7.5 % (ref 5–12)
NEUTROPHILS # BLD AUTO: 2.35 10*3/MM3 (ref 1.7–7)
NEUTROPHILS NFR BLD AUTO: 58.6 % (ref 42.7–76)
NRBC BLD AUTO-RTO: 0 /100 WBC (ref 0–0.2)
PLATELET # BLD AUTO: 225 10*3/MM3 (ref 140–450)
POTASSIUM SERPL-SCNC: 4.1 MMOL/L (ref 3.5–5.2)
PROT SERPL-MCNC: 6.2 G/DL (ref 6–8.5)
RBC # BLD AUTO: 3.99 10*6/MM3 (ref 3.77–5.28)
SODIUM SERPL-SCNC: 141 MMOL/L (ref 136–145)
WBC # BLD AUTO: 4.01 10*3/MM3 (ref 3.4–10.8)

## 2025-06-06 RX ORDER — SUCRALFATE 1 G/1
1 TABLET ORAL
Qty: 120 TABLET | Refills: 0 | Status: SHIPPED | OUTPATIENT
Start: 2025-06-06

## 2025-06-06 NOTE — PROGRESS NOTES
"Chief Complaint  Heartburn (Tums & Gaviscon Liquid )    Subjective        Machelle Ortega presents to [facility]  History of Present Illness  This is a 58 y/o female presenting to office for concerns of ongoing heartburn. Reports this has been ongoing x 2-3 weeks; reports she had been drinking some whiskey at her brother's house and this exacerbated her symptoms. She reports she had been taking tums and gaviscon quite frequently. Reports she has noticed more epigastric tenderness. Reports she has been eating a lot more oranges and tangerines lately. She reports taking her PPI at night with food. She reports no change in bowel habits; denies vomiting; denies fever. Denies any blood in stool.   Objective   Vital Signs:  /80 (BP Location: Left arm, Patient Position: Sitting, Cuff Size: Adult)   Pulse 56   Ht 162.6 cm (64.02\")   Wt 84.8 kg (187 lb)   SpO2 96%   BMI 32.08 kg/m²   Estimated body mass index is 32.08 kg/m² as calculated from the following:    Height as of this encounter: 162.6 cm (64.02\").    Weight as of this encounter: 84.8 kg (187 lb).    BMI is >= 30 and <35. (Class 1 Obesity). The following options were offered after discussion;: exercise counseling/recommendations and nutrition counseling/recommendations      Physical Exam  Constitutional:       General: She is awake.      Appearance: Normal appearance.   HENT:      Head: Normocephalic and atraumatic.      Right Ear: Hearing and external ear normal.      Left Ear: Hearing and external ear normal.      Nose: Nose normal.      Mouth/Throat:      Lips: Pink.      Mouth: Mucous membranes are moist.      Pharynx: Oropharynx is clear.   Eyes:      Extraocular Movements: Extraocular movements intact.      Conjunctiva/sclera: Conjunctivae normal.      Pupils: Pupils are equal, round, and reactive to light.   Cardiovascular:      Rate and Rhythm: Normal rate and regular rhythm.      Pulses: Normal pulses.      Heart " sounds: Normal heart sounds. No murmur heard.     No gallop.   Pulmonary:      Effort: Pulmonary effort is normal. No respiratory distress.      Breath sounds: Normal breath sounds. No wheezing or rales.   Abdominal:      General: Bowel sounds are normal. There is no distension.      Palpations: Abdomen is soft.      Tenderness: There is abdominal tenderness in the epigastric area. There is no guarding.      Comments: Mild epigastric tenderness   Musculoskeletal:      Cervical back: Normal range of motion and neck supple.   Skin:     General: Skin is warm and dry.      Capillary Refill: Capillary refill takes less than 2 seconds.   Neurological:      General: No focal deficit present.      Mental Status: She is alert and oriented to person, place, and time. Mental status is at baseline.      Motor: Motor function is intact.      Coordination: Coordination is intact.      Gait: Gait is intact.      Deep Tendon Reflexes: Reflexes are normal and symmetric.   Psychiatric:         Attention and Perception: Attention normal.         Mood and Affect: Mood normal.         Speech: Speech normal.         Behavior: Behavior normal. Behavior is cooperative.         Thought Content: Thought content normal.         Cognition and Memory: Cognition normal.         Judgment: Judgment normal.        Result Review :  The following data was reviewed by: GOMEZ Holley on 06/06/2025:  Common labs          6/13/2024    08:37 12/11/2024    10:02 6/2/2025    11:20   Common Labs   Glucose 92  114     BUN 21  10     Creatinine 0.86  0.74     Sodium 142  140     Potassium 4.6  4.9     Chloride 104  104     Calcium 9.6  10.2     Albumin 4.1  4.6     Total Bilirubin <0.2  0.3     Alkaline Phosphatase 117  117     AST (SGOT) 17  30     ALT (SGPT) 11  28     WBC 3.9  8.37     Hemoglobin 12.0  13.8     Hematocrit 36.5  43.1     Platelets 262  347     Total Cholesterol 198  268  205    Triglycerides 157  68  163    HDL Cholesterol 46  68  48   "  LDL Cholesterol  124  189  128    Hemoglobin A1C  5.40       Tobacco Use: Low Risk  (6/6/2025)    Patient History     Smoking Tobacco Use: Never     Smokeless Tobacco Use: Never     Passive Exposure: Not on file     Social History     Substance and Sexual Activity   Alcohol Use Not Currently    Comment: Holidays, socially     Family History   Problem Relation Age of Onset    Depression Mother     Lung cancer Mother     Thyroid disease Mother     Arthritis Mother     Cancer Mother         Lung cancer 2016    Diabetes Mother     Hyperlipidemia Mother     Hypertension Mother         AFIB    Miscarriages / Stillbirths Mother     Colon polyps Mother     Anxiety disorder Mother     Mental illness Mother     Asthma Mother     COPD Mother     Alzheimer's disease Mother     GI problems Mother         IBS, diverticulouses    Heart attack Mother     Atrial fibrillation Mother     Depression Father     Diabetes Father     Hyperlipidemia Father     Colon polyps Father     Vision loss Father         Corrective lenses    Arthritis Father     Hearing loss Father         Hearing loss 2020    Alcohol abuse Brother     Drug abuse Brother         Alcoholic for 20 yrs+    Arthritis Brother         Dx with RA within last 3 years    Depression Brother     Drug abuse Brother         Marijuana for chronic pain    Learning disabilities Brother         ADHD    Vision loss Brother         Corrective lenses, Macular Degeneration    Hashimoto's thyroiditis Daughter     Thyroid disease Daughter         Hashimoto's    Depression Daughter     Learning disabilities Daughter         ADHD    Vision loss Daughter         \" \"    Depression Daughter     Learning disabilities Daughter         ADHD    Vision loss Daughter         \" \"    Asthma Daughter         Allergy induced    Thyroid disease Daughter         Hashimoto    Cancer Daughter     Learning disabilities Son         ADHD, SPD    Depression Son         Depression, ADHD, OCD, Anxiety    Breast " "cancer Maternal Aunt     Cancer Maternal Aunt         Breast cancer     Depression Maternal Aunt     Miscarriages / Stillbirths Maternal Aunt     Vision loss Maternal Aunt         Corrective lenses    Anxiety disorder Maternal Aunt     Arthritis Maternal Aunt         DX with RA after having COVID    Cancer Maternal Aunt         Lung cancer     Depression Maternal Aunt     Diabetes Maternal Aunt     Diabetes Maternal Aunt         Pancreatic Insulinoma removed    Thyroid disease Maternal Aunt     Vision loss Maternal Aunt         \"  \"    Cancer Maternal Aunt         Lung cancer     Depression Maternal Aunt     Diabetes Maternal Aunt     Thyroid disease Maternal Aunt     Diabetes Maternal Aunt         Pancreatic Insulinoma removed    Thyroid disease Maternal Aunt     Vision loss Maternal Aunt         \"  \"    Prostate cancer Maternal Uncle     Cancer Maternal Uncle         Thymus and Pancreatic cancer ()    Birth defects Maternal Uncle         CP    Hyperlipidemia Maternal Uncle     Cancer Maternal Uncle         Prostate Cancer    Vision loss Maternal Uncle         Corrective lenses    Cancer Maternal Uncle         Thymus and Pancreatic Cancer    Cancer Maternal Uncle         Thymus and Pancreatic Cancer    Birth defects Maternal Uncle         CP    Hyperlipidemia Maternal Uncle     Arthritis Maternal Grandmother     Depression Maternal Grandmother     Diabetes Maternal Grandmother     Heart disease Maternal Grandmother         Had an aortic valve replace    Hyperlipidemia Maternal Grandmother     Hypertension Maternal Grandmother     Thyroid disease Maternal Grandmother     Heart valve disorder Maternal Grandmother     Cancer Maternal Grandfather         Kidney, bone cancer 1980's    Heart disease Paternal Grandmother         Congestive heart    Alcohol abuse Paternal Grandmother     Asthma Paternal Grandmother     Depression Paternal Grandmother     Alzheimer's disease Paternal Grandfather     " Alcohol abuse Paternal Grandfather     Depression Paternal Grandfather     Alcohol abuse Other     Arthritis Other     Diabetes Other     Drug abuse Other     Hepatitis Other     Hypertension Other     Malig Hyperthermia Neg Hx    CT Chest Without Contrast Diagnostic (11/14/2023 11:29 AM)   Office Visit with Hailey Dunn MD (11/20/2023)          Assessment and Plan   Diagnoses and all orders for this visit:    1. Gastroesophageal reflux disease without esophagitis (Primary)  -     sucralfate (Carafate) 1 g tablet; Take 1 tablet by mouth 4 (Four) Times a Day Before Meals & at Bedtime.  Dispense: 120 tablet; Refill: 0  -     CBC & Differential  -     Comprehensive metabolic panel  -     Lipase    Change PPI to QAM wait 30 minutes before eating, drinking, taking other medications  We discussed diet changes today including avoiding alcohol and limiting acidic foods such as oranges and tangerines  She denies any use of NSAIDs recently  Labs today  Carafate 1g 30 min before meals and at bedtime  Contact office if symptoms worsen or not improving  RTO 6/25/25 as scheduled         Follow Up   Return for Next scheduled follow up 6/25/25.  Patient was given instructions and counseling regarding her condition or for health maintenance advice. Please see specific information pulled into the AVS if appropriate.

## 2025-06-25 ENCOUNTER — OFFICE VISIT (OUTPATIENT)
Dept: INTERNAL MEDICINE | Facility: CLINIC | Age: 60
End: 2025-06-25
Payer: COMMERCIAL

## 2025-06-25 VITALS
OXYGEN SATURATION: 99 % | HEART RATE: 69 BPM | SYSTOLIC BLOOD PRESSURE: 110 MMHG | DIASTOLIC BLOOD PRESSURE: 84 MMHG | BODY MASS INDEX: 31.41 KG/M2 | WEIGHT: 184 LBS | HEIGHT: 64 IN

## 2025-06-25 DIAGNOSIS — M79.605 LEFT LEG PAIN: ICD-10-CM

## 2025-06-25 DIAGNOSIS — F51.01 PRIMARY INSOMNIA: Chronic | ICD-10-CM

## 2025-06-25 DIAGNOSIS — D50.9 IRON DEFICIENCY ANEMIA, UNSPECIFIED IRON DEFICIENCY ANEMIA TYPE: Primary | ICD-10-CM

## 2025-06-25 DIAGNOSIS — K21.9 GASTROESOPHAGEAL REFLUX DISEASE WITHOUT ESOPHAGITIS: Chronic | ICD-10-CM

## 2025-06-25 PROCEDURE — 99214 OFFICE O/P EST MOD 30 MIN: CPT | Performed by: NURSE PRACTITIONER

## 2025-06-25 RX ORDER — TRAZODONE HYDROCHLORIDE 100 MG/1
200 TABLET ORAL NIGHTLY
Qty: 180 TABLET | Refills: 1 | Status: SHIPPED | OUTPATIENT
Start: 2025-06-25

## 2025-06-25 NOTE — PROGRESS NOTES
"Chief Complaint  Anxiety, Insomnia, and Overdue Bloodwork     Subjective        Machelle Ortega presents to [facility]  History of Present Illness  This is a 60 y/o female presenting to office for f/u with concerns of reflux and low hemoglobin.   Reports she is doing well on carafate, PPI. She has noticed some improvement but reports occasional reflux symptoms. She reports she is trying to modify her diet. Denies any NSAID use or recent NSAID use. Denies any blood in stool or hemoptysis. Denies any vomiting. Reports she is eating well; she denies any unintentional weight loss. Her last colonoscopy was in 2017 in which she also had an EGD-- biopsies were normal with recommendation of repeated colonoscopy in 1 year. Denies dysphagia.     She reports she is also working with her orthopedic specialist and neurosurgeon about ongoing left leg symptoms which she reports working on scheduling EMG for. Her last X-ray showed some mild arthritis which she has had previous injection therapy for.   Objective   Vital Signs:  /84 (BP Location: Left arm, Patient Position: Sitting, Cuff Size: Adult)   Pulse 69   Ht 162.6 cm (64.02\")   Wt 83.5 kg (184 lb)   SpO2 99%   BMI 31.56 kg/m²   Estimated body mass index is 31.56 kg/m² as calculated from the following:    Height as of this encounter: 162.6 cm (64.02\").    Weight as of this encounter: 83.5 kg (184 lb).            Physical Exam  Constitutional:       Appearance: Normal appearance.   HENT:      Head: Normocephalic and atraumatic.      Right Ear: External ear normal.      Left Ear: External ear normal.      Nose: Nose normal.      Mouth/Throat:      Mouth: Mucous membranes are moist.      Pharynx: Oropharynx is clear.   Eyes:      Conjunctiva/sclera: Conjunctivae normal.      Pupils: Pupils are equal, round, and reactive to light.   Cardiovascular:      Rate and Rhythm: Normal rate and regular rhythm.      Pulses: Normal pulses.      Heart " sounds: Normal heart sounds. No murmur heard.     No gallop.   Pulmonary:      Effort: Pulmonary effort is normal. No respiratory distress.      Breath sounds: Normal breath sounds. No stridor. No wheezing, rhonchi or rales.   Musculoskeletal:      Cervical back: Normal range of motion and neck supple.   Skin:     General: Skin is warm and dry.      Capillary Refill: Capillary refill takes less than 2 seconds.   Neurological:      Mental Status: She is alert and oriented to person, place, and time. Mental status is at baseline.   Psychiatric:         Mood and Affect: Mood normal.         Thought Content: Thought content normal.         Judgment: Judgment normal.        Result Review :  The following data was reviewed by: GOMEZ Holley on 06/25/2025:  Common labs          12/11/2024    10:02 6/2/2025    11:20 6/6/2025    09:22   Common Labs   Glucose 114   84    BUN 10   18.0    Creatinine 0.74   0.71    Sodium 140   141    Potassium 4.9   4.1    Chloride 104   106    Calcium 10.2   9.7    Albumin 4.6   4.1    Total Bilirubin 0.3   0.3    Alkaline Phosphatase 117   98    AST (SGOT) 30   16    ALT (SGPT) 28   15    WBC 8.37   4.01    Hemoglobin 13.8   11.5    Hematocrit 43.1   34.7    Platelets 347   225    Total Cholesterol 268  205     Triglycerides 68  163     HDL Cholesterol 68  48     LDL Cholesterol  189  128     Hemoglobin A1C 5.40        Tobacco Use: Low Risk  (6/25/2025)    Patient History     Smoking Tobacco Use: Never     Smokeless Tobacco Use: Never     Passive Exposure: Not on file     Social History     Substance and Sexual Activity   Alcohol Use Not Currently    Comment: Holidays, socially     Family History   Problem Relation Age of Onset    Depression Mother     Lung cancer Mother     Thyroid disease Mother     Arthritis Mother     Cancer Mother         Lung cancer 2016    Diabetes Mother     Hyperlipidemia Mother     Hypertension Mother         AFIB    Miscarriages / Stillbirths Mother      "Colon polyps Mother     Anxiety disorder Mother     Mental illness Mother     Asthma Mother     COPD Mother     Alzheimer's disease Mother     GI problems Mother         IBS, diverticulouses    Heart attack Mother     Atrial fibrillation Mother     Depression Father     Diabetes Father     Hyperlipidemia Father     Colon polyps Father     Vision loss Father         Corrective lenses    Arthritis Father     Hearing loss Father         Hearing loss 2020    Alcohol abuse Brother     Drug abuse Brother         Alcoholic for 20 yrs+    Arthritis Brother         Dx with RA within last 3 years    Depression Brother     Drug abuse Brother         Marijuana for chronic pain    Learning disabilities Brother         ADHD    Vision loss Brother         Corrective lenses, Macular Degeneration    Hashimoto's thyroiditis Daughter     Thyroid disease Daughter         Hashimoto's    Depression Daughter     Learning disabilities Daughter         ADHD    Vision loss Daughter         \" \"    Depression Daughter     Learning disabilities Daughter         ADHD    Vision loss Daughter         \" \"    Asthma Daughter         Allergy induced    Thyroid disease Daughter         Hashimoto    Cancer Daughter     Learning disabilities Son         ADHD, SPD    Depression Son         Depression, ADHD, OCD, Anxiety    Breast cancer Maternal Aunt     Cancer Maternal Aunt         Breast cancer 2003    Depression Maternal Aunt     Miscarriages / Stillbirths Maternal Aunt     Vision loss Maternal Aunt         Corrective lenses    Anxiety disorder Maternal Aunt     Arthritis Maternal Aunt         DX with RA after having COVID    Cancer Maternal Aunt         Lung cancer 2015    Depression Maternal Aunt     Diabetes Maternal Aunt     Diabetes Maternal Aunt         Pancreatic Insulinoma removed    Thyroid disease Maternal Aunt     Vision loss Maternal Aunt         \"  \"    Cancer Maternal Aunt         Lung cancer 2015    Depression Maternal Aunt     Diabetes " "Maternal Aunt     Thyroid disease Maternal Aunt     Diabetes Maternal Aunt         Pancreatic Insulinoma removed    Thyroid disease Maternal Aunt     Vision loss Maternal Aunt         \"  \"    Prostate cancer Maternal Uncle     Cancer Maternal Uncle         Thymus and Pancreatic cancer ()    Birth defects Maternal Uncle         CP    Hyperlipidemia Maternal Uncle     Cancer Maternal Uncle         Prostate Cancer    Vision loss Maternal Uncle         Corrective lenses    Cancer Maternal Uncle         Thymus and Pancreatic Cancer    Cancer Maternal Uncle         Thymus and Pancreatic Cancer    Birth defects Maternal Uncle         CP    Hyperlipidemia Maternal Uncle     Arthritis Maternal Grandmother     Depression Maternal Grandmother     Diabetes Maternal Grandmother     Heart disease Maternal Grandmother         Had an aortic valve replace    Hyperlipidemia Maternal Grandmother     Hypertension Maternal Grandmother     Thyroid disease Maternal Grandmother     Heart valve disorder Maternal Grandmother     Cancer Maternal Grandfather         Kidney, bone cancer 1980's    Heart disease Paternal Grandmother         Congestive heart    Alcohol abuse Paternal Grandmother     Asthma Paternal Grandmother     Depression Paternal Grandmother     Alzheimer's disease Paternal Grandfather     Alcohol abuse Paternal Grandfather     Depression Paternal Grandfather     Alcohol abuse Other     Arthritis Other     Diabetes Other     Drug abuse Other     Hepatitis Other     Hypertension Other     Malig Hyperthermia Neg Hx                Assessment and Plan   Diagnoses and all orders for this visit:    1. Iron deficiency anemia, unspecified iron deficiency anemia type (Primary)  Assessment & Plan:  Prev hx of iron deficiency--dating all the way back to   Continues on daily iron  Recent UA neg for RBC  Denies any visible blood in stool  Denies AUB  Continue on daily iron  Labs today   Referral placed to GI for further " evaluation of symptoms and ongoing iron deficiency     Orders:  -     CBC & Differential  -     Iron and TIBC  -     Ferritin  -     Transferrin  -     Ambulatory Referral to Gastroenterology    2. Gastroesophageal reflux disease without esophagitis  Assessment & Plan:  Continue on PPI, carafate  Reports improvement  Referral placed to GI for further evaluation of symptoms and ongoing iron deficiency       Orders:  -     Ambulatory Referral to Gastroenterology    3. Primary insomnia  Assessment & Plan:  Stable on trazodone    Orders:  -     traZODone (DESYREL) 100 MG tablet; Take 2 tablets by mouth Every Night.  Dispense: 180 tablet; Refill: 1    4. Left leg pain  Assessment & Plan:  Recommend close f/u with specialists she is currently seeing                  Follow Up   Return in about 6 months (around 12/25/2025) for Annual physical.  Patient was given instructions and counseling regarding her condition or for health maintenance advice. Please see specific information pulled into the AVS if appropriate.

## 2025-06-25 NOTE — ASSESSMENT & PLAN NOTE
Continue on PPI, carafate  Reports improvement  Referral placed to GI for further evaluation of symptoms and ongoing iron deficiency

## 2025-06-25 NOTE — ASSESSMENT & PLAN NOTE
Prev hx of iron deficiency--dating all the way back to 2020  Continues on daily iron  Recent UA neg for RBC  Denies any visible blood in stool  Denies AUB  Continue on daily iron  Labs today   Referral placed to GI for further evaluation of symptoms and ongoing iron deficiency

## 2025-06-26 LAB
BASOPHILS # BLD AUTO: 0.04 10*3/MM3 (ref 0–0.2)
BASOPHILS NFR BLD AUTO: 0.8 % (ref 0–1.5)
EOSINOPHIL # BLD AUTO: 0.11 10*3/MM3 (ref 0–0.4)
EOSINOPHIL NFR BLD AUTO: 2.3 % (ref 0.3–6.2)
ERYTHROCYTE [DISTWIDTH] IN BLOOD BY AUTOMATED COUNT: 12.8 % (ref 12.3–15.4)
FERRITIN SERPL-MCNC: 87.1 NG/ML (ref 13–150)
HCT VFR BLD AUTO: 38 % (ref 34–46.6)
HGB BLD-MCNC: 12.6 G/DL (ref 12–15.9)
IMM GRANULOCYTES # BLD AUTO: 0.01 10*3/MM3 (ref 0–0.05)
IMM GRANULOCYTES NFR BLD AUTO: 0.2 % (ref 0–0.5)
IRON SATN MFR SERPL: 23 % (ref 20–50)
IRON SERPL-MCNC: 83 MCG/DL (ref 37–145)
LYMPHOCYTES # BLD AUTO: 1.61 10*3/MM3 (ref 0.7–3.1)
LYMPHOCYTES NFR BLD AUTO: 33.1 % (ref 19.6–45.3)
MCH RBC QN AUTO: 28.6 PG (ref 26.6–33)
MCHC RBC AUTO-ENTMCNC: 33.2 G/DL (ref 31.5–35.7)
MCV RBC AUTO: 86.4 FL (ref 79–97)
MONOCYTES # BLD AUTO: 0.35 10*3/MM3 (ref 0.1–0.9)
MONOCYTES NFR BLD AUTO: 7.2 % (ref 5–12)
NEUTROPHILS # BLD AUTO: 2.75 10*3/MM3 (ref 1.7–7)
NEUTROPHILS NFR BLD AUTO: 56.4 % (ref 42.7–76)
NRBC BLD AUTO-RTO: 0 /100 WBC (ref 0–0.2)
PLATELET # BLD AUTO: 258 10*3/MM3 (ref 140–450)
RBC # BLD AUTO: 4.4 10*6/MM3 (ref 3.77–5.28)
TIBC SERPL-MCNC: 359 MCG/DL
TRANSFERRIN SERPL-MCNC: 243 MG/DL (ref 192–364)
UIBC SERPL-MCNC: 276 MCG/DL (ref 112–346)
WBC # BLD AUTO: 4.87 10*3/MM3 (ref 3.4–10.8)

## 2025-06-27 ENCOUNTER — TELEPHONE (OUTPATIENT)
Dept: NEUROSURGERY | Facility: CLINIC | Age: 60
End: 2025-06-27
Payer: COMMERCIAL

## 2025-06-27 DIAGNOSIS — G62.9 PERIPHERAL NERVE DYSFUNCTION: Primary | ICD-10-CM

## 2025-06-27 DIAGNOSIS — G89.29 CHRONIC BILATERAL LOW BACK PAIN WITHOUT SCIATICA: ICD-10-CM

## 2025-06-27 DIAGNOSIS — M54.50 CHRONIC BILATERAL LOW BACK PAIN WITHOUT SCIATICA: ICD-10-CM

## 2025-06-27 NOTE — TELEPHONE ENCOUNTER
I spoke with Ms. De Los Santos today.  Unfortunately she has been unable to get her EMG/NCS done on the left leg.  I placed another order and have referred her to .  Hopefully she can get into see him soon to get this EMG completed.  She also feels like her symptoms from her previous thoracic schwannoma are flaring up.  She would like to eventually get checked out again for that.  I saw her last in December and we had planned to get follow-up imaging in a year from this which was likely going to be a CT myelogram.  I told her that we could set up follow-up for that sooner but she stated she would like to just get her EMG done and then she will call us soon to schedule follow-up and we can discuss the EMG results as well as evaluate her back and legs.

## 2025-07-07 ENCOUNTER — OFFICE VISIT (OUTPATIENT)
Dept: GASTROENTEROLOGY | Facility: CLINIC | Age: 60
End: 2025-07-07
Payer: MEDICARE

## 2025-07-07 VITALS
BODY MASS INDEX: 31.89 KG/M2 | SYSTOLIC BLOOD PRESSURE: 124 MMHG | HEIGHT: 64 IN | WEIGHT: 186.8 LBS | DIASTOLIC BLOOD PRESSURE: 60 MMHG

## 2025-07-07 DIAGNOSIS — K59.04 CHRONIC IDIOPATHIC CONSTIPATION: Primary | ICD-10-CM

## 2025-07-07 DIAGNOSIS — K21.9 GASTROESOPHAGEAL REFLUX DISEASE, UNSPECIFIED WHETHER ESOPHAGITIS PRESENT: Chronic | ICD-10-CM

## 2025-07-07 PROCEDURE — 99214 OFFICE O/P EST MOD 30 MIN: CPT

## 2025-07-07 NOTE — PROGRESS NOTES
Patient or patient representative verbalized consent for the use of Ambient Listening during the visit with  GOMEZ Sheldon for chart documentation. 2025  09:58 EDT    Chief Complaint   Patient presents with     Iron deficiency anemia, unspecified iron deficiency anemia    Heartburn    Abdominal Pain    Constipation    Diarrhea         Patient is a 59 y.o. who presents to the office as a new patient for further evaluation of iron deficiency anemia and GERD.  Patient has a significant past medical history of ARNOL-resolved on 2025 assessment, long-term NSAID use for arthritis, endometriosis, SONU, and GERD.    3/22/2017 EGD and Colonoscopy for bloating and abdominal pain:  EGD:  Small hiatal hernia otherwise normal exam  Colonoscopy:  Unremarkable with good bowel prep  Next Colonoscopy for screening recommended at 10 -year interval due 3/2027  Surgery with Mohit Natarajan MD (2017)   Past Surgical History:  Past Surgical History:   Procedure Laterality Date    EYE SURGERY      Lasik    D & C HYSTEROSCOPY N/A 2005    Hysteroscopy, D&C, and paracervical block-Dr. Antionette Herndon    SPINE SURGERY  2006    Laminectomy to remove golf ball size schwannoma     SECTION N/A 2007    Dr. Briana Mcdaniel    APPENDECTOMY  2014    Emergency performed by Ayserny    CERVICAL CORPECTOMY N/A 2014    Anterior cervical corpectomy at C4 and C5; Anterior cervical arthrodesis at C3-C4, C4-C5 and C5-C6; placement of instrumentation; use of intraoperative microscope for microdissection; harvest of vertebral body autograft through same incision; prep of morcellized allograft-Dr. Dayron Jarrett    BRONCHOSCOPY N/A 2014    Chronic cough with abnormal chest x-ray, the exam was normal, fluoroscopically guided transbronchial brushings were obtained, transbronchial lung biopsies were performed, bronchoalveolar lavage was performed-Dr. Vitaliy Schafer    LUNG LOBECTOMY Right 2014     Exploratory bronchoscopy, exploratory right video-assisted thoracoscopy, parietal pleural biopsy, diaphragmatic biopsy, wedge resection of right lower lobe, excision of ectopic hepatic lobe, primary repair of diagphragm, subpleural pain catheters x2, mechanical pleurodesis-Dr. Darryl Benjamin    D & C HYSTEROSCOPY ENDOMETRIAL ABLATION N/A 09/15/2014    Hysteroscopy, D&C, NovaSure ablation-Dr. Antionette Herndon    ENDOMETRIAL ABLATION  2015    ENDOSCOPY N/A 03/22/2017    Procedure: ESOPHAGOGASTRODUODENOSCOPY WITH BX ;  Surgeon: Mohit Natarajan MD;  Location: [facility] ENDOSCOPY;  Service:     COLONOSCOPY N/A 03/22/2017    Procedure: COLONOSCOPY TO CECUM ;  Surgeon: Mohit Natarajan MD;  [facility];  Service:     SPINAL CORD STIMULATOR IMPLANT Bilateral 12/28/2017    Procedure: SPINAL CORD STIMULATOR INSERTION PHASE 1,  laminotomy for placement of [employer] lead  from T9 to T6;  Surgeon: Dayron Jarrett MD;  Location: [facility] MAIN OR;  Service:     SPINAL CORD STIMULATOR IMPLANT N/A 12/29/2017    Procedure: SPINAL CORD STIMULATOR INSERTION PHASE 2;  Surgeon: Dayron Jarrett MD;  Location: [facility] MAIN OR;  Service:     ABLATION OF DYSRHYTHMIC FOCUS  2015    Vaginal    BACK SURGERY  2005    Schwannoma    BLADDER NECK RECONSTRUCTION N/A     BREAST BIOPSY  Several    On both sides    EPIDURAL BLOCK  Many    between 2006 to 2012    LIVER SURGERY      LUMBAR SPINAL TUMOR REMOVAL N/A     spinal tumor 2006  T10/11 schwannoma    NECK SURGERY  Unknown    Spinal fusion C3-C6    SPINAL CORD STIMULATOR IMPLANT      TRIAL    SPINAL FUSION  Unknown    Done by Dr. Galloway    TOTAL SHOULDER REVERSE ARTHROPLASTY      Feb 2025       Social History:  Social History     Tobacco Use    Smoking status: Never    Smokeless tobacco: Never   Substance Use Topics    Alcohol use: Not Currently     Comment: Holidays, socially     Social History     Substance and Sexual Activity   Drug Use Never       Family  "history:  Family History   Problem Relation Age of Onset    Depression Mother     Lung cancer Mother     Thyroid disease Mother     Arthritis Mother     Cancer Mother         Lung cancer 2016    Diabetes Mother     Hyperlipidemia Mother     Hypertension Mother         AFIB    Miscarriages / Stillbirths Mother     Colon polyps Mother     Anxiety disorder Mother     Mental illness Mother     Asthma Mother     COPD Mother     Alzheimer's disease Mother     GI problems Mother         IBS, diverticulouses    Heart attack Mother     Atrial fibrillation Mother     Depression Father     Diabetes Father     Hyperlipidemia Father     Colon polyps Father     Vision loss Father         Corrective lenses    Arthritis Father     Hearing loss Father         Hearing loss 2020    Alcohol abuse Brother     Drug abuse Brother         Alcoholic for 20 yrs+    Arthritis Brother         Dx with RA within last 3 years    Depression Brother     Drug abuse Brother         Marijuana for chronic pain    Learning disabilities Brother         ADHD    Vision loss Brother         Corrective lenses, Macular Degeneration    Hashimoto's thyroiditis Daughter     Thyroid disease Daughter         Hashimoto's    Depression Daughter     Learning disabilities Daughter         ADHD    Vision loss Daughter         \" \"    Depression Daughter     Learning disabilities Daughter         ADHD    Vision loss Daughter         \" \"    Asthma Daughter         Allergy induced    Thyroid disease Daughter         Hashimoto    Cancer Daughter     Learning disabilities Son         ADHD, SPD    Depression Son         Depression, ADHD, OCD, Anxiety    Breast cancer Maternal Aunt     Cancer Maternal Aunt         Breast cancer 2003    Depression Maternal Aunt     Miscarriages / Stillbirths Maternal Aunt     Vision loss Maternal Aunt         Corrective lenses    Anxiety disorder Maternal Aunt     Arthritis Maternal Aunt         DX with RA after having COVID    Cancer Maternal " "Aunt         Lung cancer 2015    Depression Maternal Aunt     Diabetes Maternal Aunt     Diabetes Maternal Aunt         Pancreatic Insulinoma removed    Thyroid disease Maternal Aunt     Vision loss Maternal Aunt         \"  \"    Cancer Maternal Aunt         Lung cancer 2015    Depression Maternal Aunt     Diabetes Maternal Aunt     Thyroid disease Maternal Aunt     Diabetes Maternal Aunt         Pancreatic Insulinoma removed    Thyroid disease Maternal Aunt     Vision loss Maternal Aunt         \"  \"    Prostate cancer Maternal Uncle     Cancer Maternal Uncle         Thymus and Pancreatic cancer ()    Birth defects Maternal Uncle         CP    Hyperlipidemia Maternal Uncle     Cancer Maternal Uncle         Prostate Cancer    Vision loss Maternal Uncle         Corrective lenses    Cancer Maternal Uncle         Thymus and Pancreatic Cancer    Cancer Maternal Uncle         Thymus and Pancreatic Cancer    Birth defects Maternal Uncle         CP    Hyperlipidemia Maternal Uncle     Arthritis Maternal Grandmother     Depression Maternal Grandmother     Diabetes Maternal Grandmother     Heart disease Maternal Grandmother         Had an aortic valve replace    Hyperlipidemia Maternal Grandmother     Hypertension Maternal Grandmother     Thyroid disease Maternal Grandmother     Heart valve disorder Maternal Grandmother     Cancer Maternal Grandfather         Kidney, bone cancer 1980's    Heart disease Paternal Grandmother         Congestive heart    Alcohol abuse Paternal Grandmother     Asthma Paternal Grandmother     Depression Paternal Grandmother     Alzheimer's disease Paternal Grandfather     Alcohol abuse Paternal Grandfather     Depression Paternal Grandfather     Alcohol abuse Other     Arthritis Other     Diabetes Other     Drug abuse Other     Hepatitis Other     Hypertension Other     Malig Hyperthermia Neg Hx        History of Present Illness  The patient presents for evaluation of anemia, " gastroesophageal reflux disease (GERD), and chronic constipation.    Gastroesophageal Reflux Disease (GERD)  - The patient reports experiencing heartburn and has been on pantoprazole daily and sucralfate three times a day for the past 1-2 weeks, with noted improvement.  - Sucralfate doses are administered between 8:00-9:30 AM, around 1:00 PM, and before bedtime.    Chronic Constipation  - The patient describes chronic constipation, with bowel movements occurring approximately twice a month.  - Despite a diet rich in fruits and salads and the consumption of hydration drinks, they have not achieved relief.  - No pharmacological treatment for constipation has been initiated.  - The patient denies hematochezia, melena, or stools of dark, tarry, or black appearance.  - They report some weight fluctuations but no significant unintentional weight loss.  - The patient mentions a noticeable bulge on one side of the abdomen when standing, which is not palpable when lying down. Dr. Hoyos did not express concerns regarding this finding.  - The patient attempted MiraLAX three times a day without success.    Anemia (resolved)  - The patient has depleted their iron supplement and has not refilled the prescription.    SOCIAL HISTORY  - Diet: Eats a lot of fruit and salads    FAMILY HISTORY  - Father: Colon polyps  - Negative for colon cancer in first-degree relatives           Current/Previous treatment for GERD  Current:  Pantoprazole 40 mg daily and sucralfate TID     Current/Previous treatment for constipation  Previous:  MiraLAX 3 times daily-ineffective      Medications    Current Outpatient Medications:     albuterol (ACCUNEB) 1.25 MG/3ML nebulizer solution, Take 3 mL by nebulization Every 6 (Six) Hours As Needed for Wheezing., Disp: 30 each, Rfl: 4    albuterol sulfate  (90 Base) MCG/ACT inhaler, Inhale 2 puffs Every 4 (Four) Hours As Needed for Wheezing., Disp: 6.7 g, Rfl: 1    buPROPion XL (WELLBUTRIN XL) 150 MG  24 hr tablet, Take 1 tablet by mouth Every Morning., Disp: 90 tablet, Rfl: 1    cholecalciferol (VITAMIN D3) 25 MCG (1000 UT) tablet, Take 1 tablet by mouth Daily., Disp: , Rfl:     Diclofenac Sodium (VOLTAREN) 1 % gel gel, Apply 4 g topically to the appropriate area as directed 2 (Two) Times a Day As Needed (for RLE pain)., Disp: 350 g, Rfl: 1    Ferrous Sulfate (IRON PO), Take  by mouth., Disp: , Rfl:     FLUoxetine (PROzac) 20 MG capsule, Take 3 capsules by mouth Every Night., Disp: 270 capsule, Rfl: 1    guaiFENesin (MUCINEX) 600 MG 12 hr tablet, Take 2 tablets by mouth 2 (Two) Times a Day. (Patient taking differently: Take 2 tablets by mouth As Needed.), Disp: , Rfl:     pantoprazole (PROTONIX) 40 MG EC tablet, Take 1 tablet by mouth Daily., Disp: 90 tablet, Rfl: 1    solifenacin (VESICARE) 10 MG tablet, Take 1 tablet by mouth Daily., Disp: 90 tablet, Rfl: 1    sucralfate (Carafate) 1 g tablet, Take 1 tablet by mouth 4 (Four) Times a Day Before Meals & at Bedtime., Disp: 120 tablet, Rfl: 0    traZODone (DESYREL) 100 MG tablet, Take 2 tablets by mouth Every Night., Disp: 180 tablet, Rfl: 1    linaclotide (Linzess) 145 MCG capsule capsule, Take 1 capsule by mouth Every Morning Before Breakfast., Disp: 12 capsule, Rfl: 0  Result Review :      Common labs          6/2/2025    11:20 6/6/2025    09:22 6/25/2025    08:25   Common Labs   Glucose  84     BUN  18.0     Creatinine  0.71     Sodium  141     Potassium  4.1     Chloride  106     Calcium  9.7     Albumin  4.1     Total Bilirubin  0.3     Alkaline Phosphatase  98     AST (SGOT)  16     ALT (SGPT)  15     WBC  4.01  4.87    Hemoglobin  11.5  12.6    Hematocrit  34.7  38.0    Platelets  225  258    Total Cholesterol 205      Triglycerides 163      HDL Cholesterol 48      LDL Cholesterol  128        Office Visit with Ira Vila APRN (06/25/2025)   Vital Signs:   /60 (BP Location: Left arm, Patient Position: Sitting, Cuff Size: Adult)   Ht 162.6 cm  "(64.02\")   Wt 84.7 kg (186 lb 12.8 oz)   BMI 32.05 kg/m²     Body mass index is 32.05 kg/m².      Physical Exam  Constitutional:       General: She is not in acute distress.     Appearance: Normal appearance. She is not ill-appearing.   HENT:      Head: Normocephalic.   Eyes:      General: No scleral icterus.  Pulmonary:      Effort: No respiratory distress.   Abdominal:      General: Abdomen is flat. Bowel sounds are normal. There is no distension.      Palpations: Abdomen is soft. There is no mass.      Tenderness: There is no abdominal tenderness. There is no guarding or rebound.      Hernia: No hernia is present.   Skin:     General: Skin is warm and dry.   Neurological:      Mental Status: She is alert.      Gait: Gait normal.   Psychiatric:         Mood and Affect: Mood normal.       Assessment and Plan    Diagnoses and all orders for this visit:    1. Chronic idiopathic constipation (Primary)  -     linaclotide (Linzess) 145 MCG capsule capsule; Take 1 capsule by mouth Every Morning Before Breakfast.  Dispense: 12 capsule; Refill: 0    2. Gastroesophageal reflux disease, unspecified whether esophagitis present       Assessment & Plan  Anemia (resolved):  - Hemoglobin 12.6  - Recommend continue to follow-up with primary care provider for further recommendations on need to continue iron supplementation.  Ideally would avoid if possible as this can exacerbate constipation symptoms.    Heartburn:  - Continue pantoprazole daily; symptoms described as well-controlled without heartburn, nausea, vomiting, or dysphagia.  - Use sucralfate only for symptom relief-as this can contribute to worsening constipation.  Will plan to reintroduce 3 times daily if needed if heartburn returns    Constipation:  - Chronic constipation with bowel movements twice a month  - Initiate Linzess 145 mcg once daily, at least 30 minutes prior to the first meal  - Expect initial cleansing within 2 weeks, with gradual improvement  - " Discussed common side effects, including diarrhea and cramping  - Provide Linzess samples  - Adjust dosage based on response if initial dose is ineffective    Health maintenance:  - Based on Dr. Natarajan's notes her next colonoscopy is not recommended until 2027.  Patient denies update and family history including confirms absence of colon cancer in first-degree relatives or grandparents.  - Could consider proceeding on sooner basis if patient develops alarm symptoms such as unintentional weight loss, nocturnal bowel movements, or if anemia returns which if occurs would recommend proceeding with bidirectional endoscopy.    Follow-up:  - Scheduled in 1 month    Patient is agreeable to the outlined above treatment plan.  Verbalizes understanding and will contact office for any new or worsening concerns.  All questions answered and support provided.  Patient Instructions   Ways to help reduce heartburn symptoms:    continue Protonix/Pantoprazole 40 mg daily prior to breakfast  Stop the sucralfate or transition to taking only for symptom relief   Avoid eating late night snacks within 3 hours of going to bed  If you have increased abdominal body weight, lifestyle changes to improve weight can help with heartburn symtoms  If you use tobacco products, we recommend that you stop smoking including e-cigarettes  Research has proven that people with heartburn who use tobacco can reduce heartburn symptoms by 44% after they stop smoking.   Sleep on your left side  Sleeping with your head/upper body elevated with a wedge pillow or with the head of the bed inclined   Avoid foods that can trigger symptoms which may include:  citrus fruits  spicy foods,  Tomatoes and Red sauces   Chocolate  coffee/tea  caffeinated or carbonated beverages  Alcohol  High fat foods  peppermint      For Constipation:   Start Linzess 145 mcg 1 pill at least 30 minutes before first meal of the day.  Taking it with food can increase risk for diarrhea  It  may take trying several different medications to find the right medication regimen to help regulate your bowel movements.    The goal for treatment with constipation is to find the best medication to promote more regular bowel movements with complete evacuation.  It can take up to 14 days to see full benefit of medication and daily use of Linzess helps to promote treatment success.          EMR Dragon/Transcription Disclaimer:  This document has been Dictated utilizing Dragon dictation.     Meghan Gaston, MSN, APRN, FNP-C   [facility]  Gastroenterology   [address] [id number] [phone] office  [phone] fax

## 2025-07-07 NOTE — PATIENT INSTRUCTIONS
Ways to help reduce heartburn symptoms:    continue Protonix/Pantoprazole 40 mg daily prior to breakfast  Stop the sucralfate or transition to taking only for symptom relief   Avoid eating late night snacks within 3 hours of going to bed  If you have increased abdominal body weight, lifestyle changes to improve weight can help with heartburn symtoms  If you use tobacco products, we recommend that you stop smoking including e-cigarettes  Research has proven that people with heartburn who use tobacco can reduce heartburn symptoms by 44% after they stop smoking.   Sleep on your left side  Sleeping with your head/upper body elevated with a wedge pillow or with the head of the bed inclined   Avoid foods that can trigger symptoms which may include:  citrus fruits  spicy foods,  Tomatoes and Red sauces   Chocolate  coffee/tea  caffeinated or carbonated beverages  Alcohol  High fat foods  peppermint      For Constipation:   Start Linzess 145 mcg 1 pill at least 30 minutes before first meal of the day.  Taking it with food can increase risk for diarrhea  It may take trying several different medications to find the right medication regimen to help regulate your bowel movements.    The goal for treatment with constipation is to find the best medication to promote more regular bowel movements with complete evacuation.  It can take up to 14 days to see full benefit of medication and daily use of Linzess helps to promote treatment success.

## 2025-08-18 DIAGNOSIS — D50.9 IRON DEFICIENCY ANEMIA, UNSPECIFIED IRON DEFICIENCY ANEMIA TYPE: Primary | ICD-10-CM

## (undated) DEVICE — MARKR SKIN W/RULR AND LBL

## (undated) DEVICE — OR CABLE 2X8 61CM AND EXTENSION

## (undated) DEVICE — GLV SURG BIOGEL LTX PF 7

## (undated) DEVICE — DRP MICROSCP LEICA W/GLASS LENS

## (undated) DEVICE — Device: Brand: DEFENDO AIR/WATER/SUCTION AND BIOPSY VALVE

## (undated) DEVICE — NDL HYPO PRECISIONGLIDE REG 25G 1 1/2

## (undated) DEVICE — SYR CONTRL LUERLOK 10CC

## (undated) DEVICE — NDL SPINE 22G 31/2IN BLK

## (undated) DEVICE — SUT SILK 3/0 SH CR5 18IN C0135

## (undated) DEVICE — CVR PROB 96IN LF STRL

## (undated) DEVICE — PASSING ELEVATOR: Brand: PRECISION ™

## (undated) DEVICE — CHARGING SYSTEM KIT: Brand: PRECISION™

## (undated) DEVICE — ENCORE® LATEX ORTHO SIZE 8, STERILE LATEX POWDER-FREE SURGICAL GLOVE: Brand: ENCORE

## (undated) DEVICE — PADL BLANK TIGHT FOR COVEREDGE 32LD STRL

## (undated) DEVICE — DIFFUSER: Brand: CORE, MAESTRO

## (undated) DEVICE — SUT SILK 2/0 TIES 18IN A185H

## (undated) DEVICE — CANN NASL CO2 TRULINK W/O2 A/

## (undated) DEVICE — APPL CHLORAPREP W/TINT 26ML ORNG

## (undated) DEVICE — THE TORRENT IRRIGATION SCOPE CONNECTOR IS USED WITH THE TORRENT IRRIGATION TUBING TO PROVIDE IRRIGATION FLUIDS SUCH AS STERILE WATER DURING GASTROINTESTINAL ENDOSCOPIC PROCEDURES WHEN USED IN CONJUNCTION WITH AN IRRIGATION PUMP (OR ELECTROSURGICAL UNIT).: Brand: TORRENT

## (undated) DEVICE — PK NEURO SPINE 40

## (undated) DEVICE — OIL CARTRIDGE: Brand: CORE, MAESTRO

## (undated) DEVICE — BITEBLOCK OMNI BLOC

## (undated) DEVICE — 3.0MM PRECISION NEURO (MATCH HEAD)

## (undated) DEVICE — DISPOSABLE BIPOLAR FORCEPS 7 3/4" (19.7CM) SCOVILLE BAYONET, 1.5MM TIP AND 12 FT. (3.6M) CABLE: Brand: KIRWAN

## (undated) DEVICE — STPLR SKIN VISISTAT WD 35CT

## (undated) DEVICE — REMOTE CONTROL KIT: Brand: FREELINK™

## (undated) DEVICE — SPNG LAP 18X18IN LF STRL PK/5

## (undated) DEVICE — SEALANT HEMOS FLOSEAL MATRX W/MALL TP 5ML

## (undated) DEVICE — ADHS SKIN DERMABOND TOP ADVANCED

## (undated) DEVICE — GOWN,NON-REINFORCED,SIRUS,SET IN SLV,XXL: Brand: MEDLINE

## (undated) DEVICE — 35CM LONG TUNNELING TOOL

## (undated) DEVICE — TUBING, SUCTION, 1/4" X 10', STRAIGHT: Brand: MEDLINE

## (undated) DEVICE — ANTIBACTERIAL UNDYED BRAIDED (POLYGLACTIN 910), SYNTHETIC ABSORBABLE SUTURE: Brand: COATED VICRYL

## (undated) DEVICE — SINGLE-USE BIOPSY FORCEPS: Brand: RADIAL JAW 4

## (undated) DEVICE — DRSNG WND BORDR/ADHS NONADHR/GZ LF 4X4IN STRL